# Patient Record
Sex: FEMALE | Race: OTHER | NOT HISPANIC OR LATINO | URBAN - METROPOLITAN AREA
[De-identification: names, ages, dates, MRNs, and addresses within clinical notes are randomized per-mention and may not be internally consistent; named-entity substitution may affect disease eponyms.]

---

## 2017-12-13 ENCOUNTER — APPOINTMENT (RX ONLY)
Dept: URBAN - METROPOLITAN AREA CLINIC 4 | Facility: CLINIC | Age: 37
Setting detail: DERMATOLOGY
End: 2017-12-13

## 2017-12-13 DIAGNOSIS — Z71.89 OTHER SPECIFIED COUNSELING: ICD-10-CM

## 2017-12-13 DIAGNOSIS — L70.0 ACNE VULGARIS: ICD-10-CM

## 2017-12-13 DIAGNOSIS — L81.4 OTHER MELANIN HYPERPIGMENTATION: ICD-10-CM

## 2017-12-13 PROCEDURE — 99213 OFFICE O/P EST LOW 20 MIN: CPT

## 2017-12-13 PROCEDURE — ? COUNSELING

## 2017-12-13 PROCEDURE — ? PRESCRIPTION

## 2017-12-13 ASSESSMENT — LOCATION DETAILED DESCRIPTION DERM
LOCATION DETAILED: LEFT PROXIMAL POSTERIOR UPPER ARM
LOCATION DETAILED: INFERIOR MID FOREHEAD
LOCATION DETAILED: RIGHT INFERIOR MEDIAL MALAR CHEEK
LOCATION DETAILED: RIGHT PROXIMAL POSTERIOR UPPER ARM
LOCATION DETAILED: LEFT INFERIOR CENTRAL MALAR CHEEK
LOCATION DETAILED: RIGHT SUPERIOR MEDIAL UPPER BACK

## 2017-12-13 ASSESSMENT — LOCATION ZONE DERM
LOCATION ZONE: ARM
LOCATION ZONE: TRUNK
LOCATION ZONE: FACE

## 2017-12-13 ASSESSMENT — LOCATION SIMPLE DESCRIPTION DERM
LOCATION SIMPLE: LEFT POSTERIOR UPPER ARM
LOCATION SIMPLE: RIGHT CHEEK
LOCATION SIMPLE: RIGHT UPPER BACK
LOCATION SIMPLE: RIGHT POSTERIOR UPPER ARM
LOCATION SIMPLE: LEFT CHEEK
LOCATION SIMPLE: INFERIOR FOREHEAD

## 2017-12-14 RX ORDER — DOXYCYCLINE HYCLATE 100 MG/1
CAPSULE, GELATIN COATED ORAL BID
Qty: 60 | Refills: 4 | Status: ERX | COMMUNITY
Start: 2017-12-14

## 2017-12-14 RX ADMIN — DOXYCYCLINE HYCLATE Q: 100 CAPSULE, GELATIN COATED ORAL at 00:00

## 2018-06-12 RX ORDER — DOXYCYCLINE HYCLATE 100 MG/1
CAPSULE, GELATIN COATED ORAL BID
Qty: 60 | Refills: 4 | Status: ERX

## 2018-06-13 RX ORDER — DOXYCYCLINE HYCLATE 100 MG/1
CAPSULE, GELATIN COATED ORAL BID
Qty: 60 | Refills: 0 | Status: ERX

## 2018-12-17 RX ORDER — DOXYCYCLINE HYCLATE 100 MG/1
100 MG CAPSULE, GELATIN COATED ORAL BID
Qty: 60 | Refills: 0 | Status: ERX

## 2019-02-26 RX ORDER — DOXYCYCLINE HYCLATE 100 MG/1
CAPSULE, GELATIN COATED ORAL BID
Qty: 60 | Refills: 4 | Status: ERX

## 2019-03-12 ENCOUNTER — APPOINTMENT (RX ONLY)
Dept: URBAN - METROPOLITAN AREA CLINIC 4 | Facility: CLINIC | Age: 39
Setting detail: DERMATOLOGY
End: 2019-03-12

## 2019-03-12 DIAGNOSIS — L70.0 ACNE VULGARIS: ICD-10-CM

## 2019-03-12 DIAGNOSIS — L91.8 OTHER HYPERTROPHIC DISORDERS OF THE SKIN: ICD-10-CM

## 2019-03-12 DIAGNOSIS — L70.8 OTHER ACNE: ICD-10-CM

## 2019-03-12 PROCEDURE — ? COUNSELING

## 2019-03-12 PROCEDURE — ? TREATMENT REGIMEN

## 2019-03-12 PROCEDURE — 99213 OFFICE O/P EST LOW 20 MIN: CPT

## 2019-03-12 PROCEDURE — ? BENIGN DESTRUCTION COSMETIC

## 2019-03-12 PROCEDURE — ? PRESCRIPTION

## 2019-03-12 RX ORDER — DOXYCYCLINE HYCLATE 100 MG/1
CAPSULE, GELATIN COATED ORAL QD
Qty: 60 | Refills: 11 | Status: ERX

## 2019-03-12 ASSESSMENT — LOCATION SIMPLE DESCRIPTION DERM
LOCATION SIMPLE: LEFT CHEEK
LOCATION SIMPLE: LEFT BREAST
LOCATION SIMPLE: ABDOMEN

## 2019-03-12 ASSESSMENT — LOCATION DETAILED DESCRIPTION DERM
LOCATION DETAILED: LEFT MEDIAL BREAST 11-12:00 REGION
LOCATION DETAILED: LEFT INFERIOR CENTRAL MALAR CHEEK
LOCATION DETAILED: RIGHT RIB CAGE

## 2019-03-12 ASSESSMENT — LOCATION ZONE DERM
LOCATION ZONE: TRUNK
LOCATION ZONE: FACE

## 2019-03-12 NOTE — PROCEDURE: COUNSELING
Topical Clindamycin Counseling: Patient counseled that this medication may cause skin irritation or allergic reactions.  In the event of skin irritation, the patient was advised to reduce the amount of the drug applied or use it less frequently.   The patient verbalized understanding of the proper use and possible adverse effects of clindamycin.  All of the patient's questions and concerns were addressed.
Use Enhanced Medication Counseling?: No
Birth Control Pills Counseling: Birth Control Pill Counseling: I discussed with the patient the potential side effects of OCPs including but not limited to increased risk of stroke, heart attack, thrombophlebitis, deep venous thrombosis, hepatic adenomas, breast changes, GI upset, headaches, and depression.  The patient verbalized understanding of the proper use and possible adverse effects of OCPs. All of the patient's questions and concerns were addressed.
Doxycycline Pregnancy And Lactation Text: This medication is Pregnancy Category D and not consider safe during pregnancy. It is also excreted in breast milk but is considered safe for shorter treatment courses.
Azithromycin Counseling:  I discussed with the patient the risks of azithromycin including but not limited to GI upset, allergic reaction, drug rash, diarrhea, and yeast infections.
High Dose Vitamin A Counseling: Side effects reviewed, pt to contact office should one occur.
Isotretinoin Pregnancy And Lactation Text: This medication is Pregnancy Category X and is considered extremely dangerous during pregnancy. It is unknown if it is excreted in breast milk.
Spironolactone Counseling: Patient advised regarding risks of diarrhea, abdominal pain, hyperkalemia, birth defects (for female patients), liver toxicity and renal toxicity. The patient may need blood work to monitor liver and kidney function and potassium levels while on therapy. The patient verbalized understanding of the proper use and possible adverse effects of spironolactone.  All of the patient's questions and concerns were addressed.
Benzoyl Peroxide Pregnancy And Lactation Text: This medication is Pregnancy Category C. It is unknown if benzoyl peroxide is excreted in breast milk.
Erythromycin Counseling:  I discussed with the patient the risks of erythromycin including but not limited to GI upset, allergic reaction, drug rash, diarrhea, increase in liver enzymes, and yeast infections.
High Dose Vitamin A Pregnancy And Lactation Text: High dose vitamin A therapy is contraindicated during pregnancy and breast feeding.
Azithromycin Pregnancy And Lactation Text: This medication is considered safe during pregnancy and is also secreted in breast milk.
Tetracycline Counseling: Patient counseled regarding possible photosensitivity and increased risk for sunburn.  Patient instructed to avoid sunlight, if possible.  When exposed to sunlight, patients should wear protective clothing, sunglasses, and sunscreen.  The patient was instructed to call the office immediately if the following severe adverse effects occur:  hearing changes, easy bruising/bleeding, severe headache, or vision changes.  The patient verbalized understanding of the proper use and possible adverse effects of tetracycline.  All of the patient's questions and concerns were addressed. Patient understands to avoid pregnancy while on therapy due to potential birth defects.
Spironolactone Pregnancy And Lactation Text: This medication can cause feminization of the male fetus and should be avoided during pregnancy. The active metabolite is also found in breast milk.
Topical Retinoid counseling:  Patient advised to apply a pea-sized amount only at bedtime and wait 30 minutes after washing their face before applying.  If too drying, patient may add a non-comedogenic moisturizer. The patient verbalized understanding of the proper use and possible adverse effects of retinoids.  All of the patient's questions and concerns were addressed.
Topical Clindamycin Pregnancy And Lactation Text: This medication is Pregnancy Category B and is considered safe during pregnancy. It is unknown if it is excreted in breast milk.
Detail Level: Zone
Birth Control Pills Pregnancy And Lactation Text: This medication should be avoided if pregnant and for the first 30 days post-partum.
Minocycline Counseling: Patient advised regarding possible photosensitivity and discoloration of the teeth, skin, lips, tongue and gums.  Patient instructed to avoid sunlight, if possible.  When exposed to sunlight, patients should wear protective clothing, sunglasses, and sunscreen.  The patient was instructed to call the office immediately if the following severe adverse effects occur:  hearing changes, easy bruising/bleeding, severe headache, or vision changes.  The patient verbalized understanding of the proper use and possible adverse effects of minocycline.  All of the patient's questions and concerns were addressed.
Tetracycline Pregnancy And Lactation Text: This medication is Pregnancy Category D and not consider safe during pregnancy. It is also excreted in breast milk.
Tazorac Counseling:  Patient advised that medication is irritating and drying.  Patient may need to apply sparingly and wash off after an hour before eventually leaving it on overnight.  The patient verbalized understanding of the proper use and possible adverse effects of tazorac.  All of the patient's questions and concerns were addressed.
Topical Retinoid Pregnancy And Lactation Text: This medication is Pregnancy Category C. It is unknown if this medication is excreted in breast milk.
Topical Sulfur Applications Counseling: Topical Sulfur Counseling: Patient counseled that this medication may cause skin irritation or allergic reactions.  In the event of skin irritation, the patient was advised to reduce the amount of the drug applied or use it less frequently.   The patient verbalized understanding of the proper use and possible adverse effects of topical sulfur application.  All of the patient's questions and concerns were addressed.
Dapsone Counseling: I discussed with the patient the risks of dapsone including but not limited to hemolytic anemia, agranulocytosis, rashes, methemoglobinemia, kidney failure, peripheral neuropathy, headaches, GI upset, and liver toxicity.  Patients who start dapsone require monitoring including baseline LFTs and weekly CBCs for the first month, then every month thereafter.  The patient verbalized understanding of the proper use and possible adverse effects of dapsone.  All of the patient's questions and concerns were addressed.
Erythromycin Pregnancy And Lactation Text: This medication is Pregnancy Category B and is considered safe during pregnancy. It is also excreted in breast milk.
Benzoyl Peroxide Counseling: Patient counseled that medicine may cause skin irritation and bleach clothing.  In the event of skin irritation, the patient was advised to reduce the amount of the drug applied or use it less frequently.   The patient verbalized understanding of the proper use and possible adverse effects of benzoyl peroxide.  All of the patient's questions and concerns were addressed.
Tazorac Pregnancy And Lactation Text: This medication is not safe during pregnancy. It is unknown if this medication is excreted in breast milk.
Bactrim Pregnancy And Lactation Text: This medication is Pregnancy Category D and is known to cause fetal risk.  It is also excreted in breast milk.
Doxycycline Counseling:  Patient counseled regarding possible photosensitivity and increased risk for sunburn.  Patient instructed to avoid sunlight, if possible.  When exposed to sunlight, patients should wear protective clothing, sunglasses, and sunscreen.  The patient was instructed to call the office immediately if the following severe adverse effects occur:  hearing changes, easy bruising/bleeding, severe headache, or vision changes.  The patient verbalized understanding of the proper use and possible adverse effects of doxycycline.  All of the patient's questions and concerns were addressed.
Bactrim Counseling:  I discussed with the patient the risks of sulfa antibiotics including but not limited to GI upset, allergic reaction, drug rash, diarrhea, dizziness, photosensitivity, and yeast infections.  Rarely, more serious reactions can occur including but not limited to aplastic anemia, agranulocytosis, methemoglobinemia, blood dyscrasias, liver or kidney failure, lung infiltrates or desquamative/blistering drug rashes.
Topical Sulfur Applications Pregnancy And Lactation Text: This medication is Pregnancy Category C and has an unknown safety profile during pregnancy. It is unknown if this topical medication is excreted in breast milk.
Dapsone Pregnancy And Lactation Text: This medication is Pregnancy Category C and is not considered safe during pregnancy or breast feeding.
Isotretinoin Counseling: Patient should get monthly blood tests, not donate blood, not drive at night if vision affected, not share medication, and not undergo elective surgery for 6 months after tx completed. Side effects reviewed, pt to contact office should one occur.

## 2019-03-12 NOTE — PROCEDURE: BENIGN DESTRUCTION COSMETIC
Post-Care Instructions: I reviewed with the patient in detail post-care instructions. Patient is to wear sunprotection, and avoid picking at any of the treated lesions. Pt may apply Vaseline to crusted or scabbing areas.
Consent: The patient's consent was obtained including but not limited to risks of crusting, scabbing, blistering, scarring, darker or lighter pigmentary change, recurrence, incomplete removal and infection.
Anesthesia Volume In Cc: 0.5
Detail Level: Detailed
Anesthesia Type: 1% lidocaine with 1:100,000 epinephrine and a 1:10 solution of 8.4% sodium bicarbonate

## 2019-06-20 ENCOUNTER — HOSPITAL ENCOUNTER (OUTPATIENT)
Dept: RADIOLOGY | Facility: MEDICAL CENTER | Age: 39
End: 2019-06-20
Attending: PHYSICIAN ASSISTANT
Payer: COMMERCIAL

## 2019-06-20 DIAGNOSIS — R51.9 NONINTRACTABLE HEADACHE, UNSPECIFIED CHRONICITY PATTERN, UNSPECIFIED HEADACHE TYPE: ICD-10-CM

## 2019-06-20 PROCEDURE — 70450 CT HEAD/BRAIN W/O DYE: CPT

## 2020-06-03 ENCOUNTER — APPOINTMENT (RX ONLY)
Dept: URBAN - METROPOLITAN AREA CLINIC 4 | Facility: CLINIC | Age: 40
Setting detail: DERMATOLOGY
End: 2020-06-03

## 2020-06-03 DIAGNOSIS — D22 MELANOCYTIC NEVI: ICD-10-CM

## 2020-06-03 DIAGNOSIS — D18.0 HEMANGIOMA: ICD-10-CM

## 2020-06-03 DIAGNOSIS — L91.8 OTHER HYPERTROPHIC DISORDERS OF THE SKIN: ICD-10-CM

## 2020-06-03 DIAGNOSIS — L81.4 OTHER MELANIN HYPERPIGMENTATION: ICD-10-CM

## 2020-06-03 DIAGNOSIS — L70.0 ACNE VULGARIS: ICD-10-CM | Status: WELL CONTROLLED

## 2020-06-03 PROBLEM — D22.5 MELANOCYTIC NEVI OF TRUNK: Status: ACTIVE | Noted: 2020-06-03

## 2020-06-03 PROBLEM — D22.61 MELANOCYTIC NEVI OF RIGHT UPPER LIMB, INCLUDING SHOULDER: Status: ACTIVE | Noted: 2020-06-03

## 2020-06-03 PROBLEM — D18.01 HEMANGIOMA OF SKIN AND SUBCUTANEOUS TISSUE: Status: ACTIVE | Noted: 2020-06-03

## 2020-06-03 PROBLEM — D23.39 OTHER BENIGN NEOPLASM OF SKIN OF OTHER PARTS OF FACE: Status: ACTIVE | Noted: 2020-06-03

## 2020-06-03 PROBLEM — D22.62 MELANOCYTIC NEVI OF LEFT UPPER LIMB, INCLUDING SHOULDER: Status: ACTIVE | Noted: 2020-06-03

## 2020-06-03 PROCEDURE — ? TREATMENT REGIMEN

## 2020-06-03 PROCEDURE — 99213 OFFICE O/P EST LOW 20 MIN: CPT

## 2020-06-03 PROCEDURE — ? PRESCRIPTION

## 2020-06-03 PROCEDURE — ? COUNSELING

## 2020-06-03 PROCEDURE — ? BENIGN DESTRUCTION COSMETIC

## 2020-06-03 RX ORDER — DOXYCYCLINE HYCLATE 100 MG/1
CAPSULE, GELATIN COATED ORAL QD
Qty: 60 | Refills: 11 | Status: ERX

## 2020-06-03 ASSESSMENT — LOCATION DETAILED DESCRIPTION DERM
LOCATION DETAILED: RIGHT DISTAL POSTERIOR THIGH
LOCATION DETAILED: LEFT PROXIMAL DORSAL FOREARM
LOCATION DETAILED: RIGHT MEDIAL UPPER BACK
LOCATION DETAILED: RIGHT DISTAL RADIAL DORSAL FOREARM
LOCATION DETAILED: LEFT ELBOW
LOCATION DETAILED: INFERIOR THORACIC SPINE
LOCATION DETAILED: RIGHT PROXIMAL RADIAL DORSAL FOREARM
LOCATION DETAILED: LEFT DISTAL POSTERIOR THIGH
LOCATION DETAILED: LEFT INFERIOR CENTRAL MALAR CHEEK
LOCATION DETAILED: INFERIOR LUMBAR SPINE
LOCATION DETAILED: SUBXIPHOID

## 2020-06-03 ASSESSMENT — LOCATION SIMPLE DESCRIPTION DERM
LOCATION SIMPLE: LEFT POSTERIOR THIGH
LOCATION SIMPLE: RIGHT FOREARM
LOCATION SIMPLE: LEFT CHEEK
LOCATION SIMPLE: LEFT FOREARM
LOCATION SIMPLE: UPPER BACK
LOCATION SIMPLE: ABDOMEN
LOCATION SIMPLE: RIGHT UPPER BACK
LOCATION SIMPLE: LEFT ELBOW
LOCATION SIMPLE: RIGHT POSTERIOR THIGH
LOCATION SIMPLE: LOWER BACK

## 2020-06-03 ASSESSMENT — LOCATION ZONE DERM
LOCATION ZONE: TRUNK
LOCATION ZONE: ARM
LOCATION ZONE: FACE
LOCATION ZONE: LEG

## 2020-06-03 NOTE — PROCEDURE: BENIGN DESTRUCTION COSMETIC
Anesthesia Type: 1% lidocaine with 1:100,000 epinephrine and a 1:10 solution of 8.4% sodium bicarbonate
Consent: The patient's consent was obtained including but not limited to risks of crusting, scabbing, blistering, scarring, darker or lighter pigmentary change, recurrence, incomplete removal and infection.
Anesthesia Volume In Cc: 0
Detail Level: Detailed
Post-Care Instructions: I reviewed with the patient in detail post-care instructions. Patient is to wear sunprotection, and avoid picking at any of the treated lesions. Pt may apply Vaseline to crusted or scabbing areas.

## 2020-06-03 NOTE — PROCEDURE: COUNSELING
Erythromycin Pregnancy And Lactation Text: This medication is Pregnancy Category B and is considered safe during pregnancy. It is also excreted in breast milk.
Benzoyl Peroxide Pregnancy And Lactation Text: This medication is Pregnancy Category C. It is unknown if benzoyl peroxide is excreted in breast milk.
Doxycycline Pregnancy And Lactation Text: This medication is Pregnancy Category D and not consider safe during pregnancy. It is also excreted in breast milk but is considered safe for shorter treatment courses.
Minocycline Counseling: Patient advised regarding possible photosensitivity and discoloration of the teeth, skin, lips, tongue and gums.  Patient instructed to avoid sunlight, if possible.  When exposed to sunlight, patients should wear protective clothing, sunglasses, and sunscreen.  The patient was instructed to call the office immediately if the following severe adverse effects occur:  hearing changes, easy bruising/bleeding, severe headache, or vision changes.  The patient verbalized understanding of the proper use and possible adverse effects of minocycline.  All of the patient's questions and concerns were addressed.
Minocycline Pregnancy And Lactation Text: This medication is Pregnancy Category D and not consider safe during pregnancy. It is also excreted in breast milk.
High Dose Vitamin A Counseling: Side effects reviewed, pt to contact office should one occur.
Topical Clindamycin Pregnancy And Lactation Text: This medication is Pregnancy Category B and is considered safe during pregnancy. It is unknown if it is excreted in breast milk.
Isotretinoin Pregnancy And Lactation Text: This medication is Pregnancy Category X and is considered extremely dangerous during pregnancy. It is unknown if it is excreted in breast milk.
Topical Retinoid Pregnancy And Lactation Text: This medication is Pregnancy Category C. It is unknown if this medication is excreted in breast milk.
Azithromycin Counseling:  I discussed with the patient the risks of azithromycin including but not limited to GI upset, allergic reaction, drug rash, diarrhea, and yeast infections.
Detail Level: Zone
Doxycycline Counseling:  Patient counseled regarding possible photosensitivity and increased risk for sunburn.  Patient instructed to avoid sunlight, if possible.  When exposed to sunlight, patients should wear protective clothing, sunglasses, and sunscreen.  The patient was instructed to call the office immediately if the following severe adverse effects occur:  hearing changes, easy bruising/bleeding, severe headache, or vision changes.  The patient verbalized understanding of the proper use and possible adverse effects of doxycycline.  All of the patient's questions and concerns were addressed.
Bactrim Counseling:  I discussed with the patient the risks of sulfa antibiotics including but not limited to GI upset, allergic reaction, drug rash, diarrhea, dizziness, photosensitivity, and yeast infections.  Rarely, more serious reactions can occur including but not limited to aplastic anemia, agranulocytosis, methemoglobinemia, blood dyscrasias, liver or kidney failure, lung infiltrates or desquamative/blistering drug rashes.
Topical Clindamycin Counseling: Patient counseled that this medication may cause skin irritation or allergic reactions.  In the event of skin irritation, the patient was advised to reduce the amount of the drug applied or use it less frequently.   The patient verbalized understanding of the proper use and possible adverse effects of clindamycin.  All of the patient's questions and concerns were addressed.
Dapsone Pregnancy And Lactation Text: This medication is Pregnancy Category C and is not considered safe during pregnancy or breast feeding.
Spironolactone Pregnancy And Lactation Text: This medication can cause feminization of the male fetus and should be avoided during pregnancy. The active metabolite is also found in breast milk.
Azithromycin Pregnancy And Lactation Text: This medication is considered safe during pregnancy and is also secreted in breast milk.
Birth Control Pills Pregnancy And Lactation Text: This medication should be avoided if pregnant and for the first 30 days post-partum.
Use Enhanced Medication Counseling?: No
Birth Control Pills Counseling: Birth Control Pill Counseling: I discussed with the patient the potential side effects of OCPs including but not limited to increased risk of stroke, heart attack, thrombophlebitis, deep venous thrombosis, hepatic adenomas, breast changes, GI upset, headaches, and depression.  The patient verbalized understanding of the proper use and possible adverse effects of OCPs. All of the patient's questions and concerns were addressed.
Topical Retinoid counseling:  Patient advised to apply a pea-sized amount only at bedtime and wait 30 minutes after washing their face before applying.  If too drying, patient may add a non-comedogenic moisturizer. The patient verbalized understanding of the proper use and possible adverse effects of retinoids.  All of the patient's questions and concerns were addressed.
Erythromycin Counseling:  I discussed with the patient the risks of erythromycin including but not limited to GI upset, allergic reaction, drug rash, diarrhea, increase in liver enzymes, and yeast infections.
Tazorac Pregnancy And Lactation Text: This medication is not safe during pregnancy. It is unknown if this medication is excreted in breast milk.
Dapsone Counseling: I discussed with the patient the risks of dapsone including but not limited to hemolytic anemia, agranulocytosis, rashes, methemoglobinemia, kidney failure, peripheral neuropathy, headaches, GI upset, and liver toxicity.  Patients who start dapsone require monitoring including baseline LFTs and weekly CBCs for the first month, then every month thereafter.  The patient verbalized understanding of the proper use and possible adverse effects of dapsone.  All of the patient's questions and concerns were addressed.
Isotretinoin Counseling: Patient should get monthly blood tests, not donate blood, not drive at night if vision affected, not share medication, and not undergo elective surgery for 6 months after tx completed. Side effects reviewed, pt to contact office should one occur.
Tetracycline Counseling: Patient counseled regarding possible photosensitivity and increased risk for sunburn.  Patient instructed to avoid sunlight, if possible.  When exposed to sunlight, patients should wear protective clothing, sunglasses, and sunscreen.  The patient was instructed to call the office immediately if the following severe adverse effects occur:  hearing changes, easy bruising/bleeding, severe headache, or vision changes.  The patient verbalized understanding of the proper use and possible adverse effects of tetracycline.  All of the patient's questions and concerns were addressed. Patient understands to avoid pregnancy while on therapy due to potential birth defects.
Bactrim Pregnancy And Lactation Text: This medication is Pregnancy Category D and is known to cause fetal risk.  It is also excreted in breast milk.
Benzoyl Peroxide Counseling: Patient counseled that medicine may cause skin irritation and bleach clothing.  In the event of skin irritation, the patient was advised to reduce the amount of the drug applied or use it less frequently.   The patient verbalized understanding of the proper use and possible adverse effects of benzoyl peroxide.  All of the patient's questions and concerns were addressed.
High Dose Vitamin A Pregnancy And Lactation Text: High dose vitamin A therapy is contraindicated during pregnancy and breast feeding.
Spironolactone Counseling: Patient advised regarding risks of diarrhea, abdominal pain, hyperkalemia, birth defects (for female patients), liver toxicity and renal toxicity. The patient may need blood work to monitor liver and kidney function and potassium levels while on therapy. The patient verbalized understanding of the proper use and possible adverse effects of spironolactone.  All of the patient's questions and concerns were addressed.
Topical Sulfur Applications Counseling: Topical Sulfur Counseling: Patient counseled that this medication may cause skin irritation or allergic reactions.  In the event of skin irritation, the patient was advised to reduce the amount of the drug applied or use it less frequently.   The patient verbalized understanding of the proper use and possible adverse effects of topical sulfur application.  All of the patient's questions and concerns were addressed.
Topical Sulfur Applications Pregnancy And Lactation Text: This medication is Pregnancy Category C and has an unknown safety profile during pregnancy. It is unknown if this topical medication is excreted in breast milk.
Tazorac Counseling:  Patient advised that medication is irritating and drying.  Patient may need to apply sparingly and wash off after an hour before eventually leaving it on overnight.  The patient verbalized understanding of the proper use and possible adverse effects of tazorac.  All of the patient's questions and concerns were addressed.
Detail Level: Detailed

## 2021-08-04 ENCOUNTER — APPOINTMENT (RX ONLY)
Dept: URBAN - METROPOLITAN AREA CLINIC 4 | Facility: CLINIC | Age: 41
Setting detail: DERMATOLOGY
End: 2021-08-04

## 2021-08-04 DIAGNOSIS — L70.0 ACNE VULGARIS: ICD-10-CM

## 2021-08-04 DIAGNOSIS — D18.0 HEMANGIOMA: ICD-10-CM

## 2021-08-04 DIAGNOSIS — L91.8 OTHER HYPERTROPHIC DISORDERS OF THE SKIN: ICD-10-CM

## 2021-08-04 DIAGNOSIS — L81.4 OTHER MELANIN HYPERPIGMENTATION: ICD-10-CM

## 2021-08-04 DIAGNOSIS — D22 MELANOCYTIC NEVI: ICD-10-CM

## 2021-08-04 PROBLEM — D22.62 MELANOCYTIC NEVI OF LEFT UPPER LIMB, INCLUDING SHOULDER: Status: ACTIVE | Noted: 2021-08-04

## 2021-08-04 PROBLEM — D22.5 MELANOCYTIC NEVI OF TRUNK: Status: ACTIVE | Noted: 2021-08-04

## 2021-08-04 PROBLEM — D18.01 HEMANGIOMA OF SKIN AND SUBCUTANEOUS TISSUE: Status: ACTIVE | Noted: 2021-08-04

## 2021-08-04 PROBLEM — D22.61 MELANOCYTIC NEVI OF RIGHT UPPER LIMB, INCLUDING SHOULDER: Status: ACTIVE | Noted: 2021-08-04

## 2021-08-04 PROCEDURE — ? BENIGN DESTRUCTION COSMETIC

## 2021-08-04 PROCEDURE — ? COUNSELING

## 2021-08-04 PROCEDURE — 99213 OFFICE O/P EST LOW 20 MIN: CPT

## 2021-08-04 PROCEDURE — ? TREATMENT REGIMEN

## 2021-08-04 PROCEDURE — ? PRESCRIPTION

## 2021-08-04 RX ORDER — DOXYCYCLINE HYCLATE 20 MG/1
TABLET, FILM COATED ORAL
Qty: 60 | Refills: 11 | Status: ERX | COMMUNITY
Start: 2021-08-04

## 2021-08-04 RX ADMIN — DOXYCYCLINE HYCLATE: 20 TABLET, FILM COATED ORAL at 00:00

## 2021-08-04 ASSESSMENT — LOCATION DETAILED DESCRIPTION DERM
LOCATION DETAILED: LEFT DISTAL POSTERIOR THIGH
LOCATION DETAILED: RIGHT MEDIAL UPPER BACK
LOCATION DETAILED: INFERIOR LUMBAR SPINE
LOCATION DETAILED: LEFT ELBOW
LOCATION DETAILED: LEFT PROXIMAL DORSAL FOREARM
LOCATION DETAILED: LEFT INFERIOR CENTRAL MALAR CHEEK
LOCATION DETAILED: INFERIOR THORACIC SPINE
LOCATION DETAILED: SUBXIPHOID
LOCATION DETAILED: RIGHT PROXIMAL RADIAL DORSAL FOREARM
LOCATION DETAILED: RIGHT LATERAL ABDOMEN
LOCATION DETAILED: RIGHT DISTAL RADIAL DORSAL FOREARM
LOCATION DETAILED: RIGHT RIB CAGE
LOCATION DETAILED: RIGHT DISTAL POSTERIOR THIGH

## 2021-08-04 ASSESSMENT — LOCATION SIMPLE DESCRIPTION DERM
LOCATION SIMPLE: LEFT POSTERIOR THIGH
LOCATION SIMPLE: LEFT ELBOW
LOCATION SIMPLE: LEFT FOREARM
LOCATION SIMPLE: LEFT CHEEK
LOCATION SIMPLE: RIGHT POSTERIOR THIGH
LOCATION SIMPLE: LOWER BACK
LOCATION SIMPLE: ABDOMEN
LOCATION SIMPLE: UPPER BACK
LOCATION SIMPLE: RIGHT UPPER BACK
LOCATION SIMPLE: RIGHT FOREARM

## 2021-08-04 ASSESSMENT — LOCATION ZONE DERM
LOCATION ZONE: FACE
LOCATION ZONE: TRUNK
LOCATION ZONE: LEG
LOCATION ZONE: ARM

## 2021-08-04 NOTE — PROCEDURE: MIPS QUALITY
Quality 130: Documentation Of Current Medications In The Medical Record: Current Medications Documented
Quality 431: Preventive Care And Screening: Unhealthy Alcohol Use - Screening: Patient screened for unhealthy alcohol use using a single question and scores less than 2 times per year
Detail Level: Detailed
Quality 226: Preventive Care And Screening: Tobacco Use: Screening And Cessation Intervention: Patient screened for tobacco use and is an ex/non-smoker
Performed
Resulted

## 2021-08-04 NOTE — PROCEDURE: COUNSELING
Erythromycin Pregnancy And Lactation Text: This medication is Pregnancy Category B and is considered safe during pregnancy. It is also excreted in breast milk.
Benzoyl Peroxide Pregnancy And Lactation Text: This medication is Pregnancy Category C. It is unknown if benzoyl peroxide is excreted in breast milk.
Doxycycline Pregnancy And Lactation Text: This medication is Pregnancy Category D and not consider safe during pregnancy. It is also excreted in breast milk but is considered safe for shorter treatment courses.
Minocycline Counseling: Patient advised regarding possible photosensitivity and discoloration of the teeth, skin, lips, tongue and gums.  Patient instructed to avoid sunlight, if possible.  When exposed to sunlight, patients should wear protective clothing, sunglasses, and sunscreen.  The patient was instructed to call the office immediately if the following severe adverse effects occur:  hearing changes, easy bruising/bleeding, severe headache, or vision changes.  The patient verbalized understanding of the proper use and possible adverse effects of minocycline.  All of the patient's questions and concerns were addressed.
Minocycline Pregnancy And Lactation Text: This medication is Pregnancy Category D and not consider safe during pregnancy. It is also excreted in breast milk.
High Dose Vitamin A Counseling: Side effects reviewed, pt to contact office should one occur.
Topical Clindamycin Pregnancy And Lactation Text: This medication is Pregnancy Category B and is considered safe during pregnancy. It is unknown if it is excreted in breast milk.
Isotretinoin Pregnancy And Lactation Text: This medication is Pregnancy Category X and is considered extremely dangerous during pregnancy. It is unknown if it is excreted in breast milk.
Topical Retinoid Pregnancy And Lactation Text: This medication is Pregnancy Category C. It is unknown if this medication is excreted in breast milk.
Azithromycin Counseling:  I discussed with the patient the risks of azithromycin including but not limited to GI upset, allergic reaction, drug rash, diarrhea, and yeast infections.
Detail Level: Zone
Doxycycline Counseling:  Patient counseled regarding possible photosensitivity and increased risk for sunburn.  Patient instructed to avoid sunlight, if possible.  When exposed to sunlight, patients should wear protective clothing, sunglasses, and sunscreen.  The patient was instructed to call the office immediately if the following severe adverse effects occur:  hearing changes, easy bruising/bleeding, severe headache, or vision changes.  The patient verbalized understanding of the proper use and possible adverse effects of doxycycline.  All of the patient's questions and concerns were addressed.
Bactrim Counseling:  I discussed with the patient the risks of sulfa antibiotics including but not limited to GI upset, allergic reaction, drug rash, diarrhea, dizziness, photosensitivity, and yeast infections.  Rarely, more serious reactions can occur including but not limited to aplastic anemia, agranulocytosis, methemoglobinemia, blood dyscrasias, liver or kidney failure, lung infiltrates or desquamative/blistering drug rashes.
Topical Clindamycin Counseling: Patient counseled that this medication may cause skin irritation or allergic reactions.  In the event of skin irritation, the patient was advised to reduce the amount of the drug applied or use it less frequently.   The patient verbalized understanding of the proper use and possible adverse effects of clindamycin.  All of the patient's questions and concerns were addressed.
Dapsone Pregnancy And Lactation Text: This medication is Pregnancy Category C and is not considered safe during pregnancy or breast feeding.
Spironolactone Pregnancy And Lactation Text: This medication can cause feminization of the male fetus and should be avoided during pregnancy. The active metabolite is also found in breast milk.
Azithromycin Pregnancy And Lactation Text: This medication is considered safe during pregnancy and is also secreted in breast milk.
Birth Control Pills Pregnancy And Lactation Text: This medication should be avoided if pregnant and for the first 30 days post-partum.
Use Enhanced Medication Counseling?: No
Birth Control Pills Counseling: Birth Control Pill Counseling: I discussed with the patient the potential side effects of OCPs including but not limited to increased risk of stroke, heart attack, thrombophlebitis, deep venous thrombosis, hepatic adenomas, breast changes, GI upset, headaches, and depression.  The patient verbalized understanding of the proper use and possible adverse effects of OCPs. All of the patient's questions and concerns were addressed.
Topical Retinoid counseling:  Patient advised to apply a pea-sized amount only at bedtime and wait 30 minutes after washing their face before applying.  If too drying, patient may add a non-comedogenic moisturizer. The patient verbalized understanding of the proper use and possible adverse effects of retinoids.  All of the patient's questions and concerns were addressed.
Erythromycin Counseling:  I discussed with the patient the risks of erythromycin including but not limited to GI upset, allergic reaction, drug rash, diarrhea, increase in liver enzymes, and yeast infections.
Tazorac Pregnancy And Lactation Text: This medication is not safe during pregnancy. It is unknown if this medication is excreted in breast milk.
Dapsone Counseling: I discussed with the patient the risks of dapsone including but not limited to hemolytic anemia, agranulocytosis, rashes, methemoglobinemia, kidney failure, peripheral neuropathy, headaches, GI upset, and liver toxicity.  Patients who start dapsone require monitoring including baseline LFTs and weekly CBCs for the first month, then every month thereafter.  The patient verbalized understanding of the proper use and possible adverse effects of dapsone.  All of the patient's questions and concerns were addressed.
Isotretinoin Counseling: Patient should get monthly blood tests, not donate blood, not drive at night if vision affected, not share medication, and not undergo elective surgery for 6 months after tx completed. Side effects reviewed, pt to contact office should one occur.
Tetracycline Counseling: Patient counseled regarding possible photosensitivity and increased risk for sunburn.  Patient instructed to avoid sunlight, if possible.  When exposed to sunlight, patients should wear protective clothing, sunglasses, and sunscreen.  The patient was instructed to call the office immediately if the following severe adverse effects occur:  hearing changes, easy bruising/bleeding, severe headache, or vision changes.  The patient verbalized understanding of the proper use and possible adverse effects of tetracycline.  All of the patient's questions and concerns were addressed. Patient understands to avoid pregnancy while on therapy due to potential birth defects.
Bactrim Pregnancy And Lactation Text: This medication is Pregnancy Category D and is known to cause fetal risk.  It is also excreted in breast milk.
Benzoyl Peroxide Counseling: Patient counseled that medicine may cause skin irritation and bleach clothing.  In the event of skin irritation, the patient was advised to reduce the amount of the drug applied or use it less frequently.   The patient verbalized understanding of the proper use and possible adverse effects of benzoyl peroxide.  All of the patient's questions and concerns were addressed.
High Dose Vitamin A Pregnancy And Lactation Text: High dose vitamin A therapy is contraindicated during pregnancy and breast feeding.
Spironolactone Counseling: Patient advised regarding risks of diarrhea, abdominal pain, hyperkalemia, birth defects (for female patients), liver toxicity and renal toxicity. The patient may need blood work to monitor liver and kidney function and potassium levels while on therapy. The patient verbalized understanding of the proper use and possible adverse effects of spironolactone.  All of the patient's questions and concerns were addressed.
Topical Sulfur Applications Counseling: Topical Sulfur Counseling: Patient counseled that this medication may cause skin irritation or allergic reactions.  In the event of skin irritation, the patient was advised to reduce the amount of the drug applied or use it less frequently.   The patient verbalized understanding of the proper use and possible adverse effects of topical sulfur application.  All of the patient's questions and concerns were addressed.
Topical Sulfur Applications Pregnancy And Lactation Text: This medication is Pregnancy Category C and has an unknown safety profile during pregnancy. It is unknown if this topical medication is excreted in breast milk.
Tazorac Counseling:  Patient advised that medication is irritating and drying.  Patient may need to apply sparingly and wash off after an hour before eventually leaving it on overnight.  The patient verbalized understanding of the proper use and possible adverse effects of tazorac.  All of the patient's questions and concerns were addressed.

## 2021-08-04 NOTE — PROCEDURE: BENIGN DESTRUCTION COSMETIC
Post-Care Instructions: I reviewed with the patient in detail post-care instructions. Patient is to wear sunprotection, and avoid picking at any of the treated lesions. Pt may apply Vaseline to crusted or scabbing areas.
Anesthesia Type: 1% lidocaine with 1:100,000 epinephrine and a 1:10 solution of 8.4% sodium bicarbonate
Consent: The patient's consent was obtained including but not limited to risks of crusting, scabbing, blistering, scarring, darker or lighter pigmentary change, recurrence, incomplete removal and infection.
Detail Level: Detailed
Anesthesia Volume In Cc: 0.5

## 2021-12-21 ENCOUNTER — APPOINTMENT (OUTPATIENT)
Dept: NEUROLOGY | Facility: MEDICAL CENTER | Age: 41
End: 2021-12-21
Payer: COMMERCIAL

## 2022-02-14 ENCOUNTER — OFFICE VISIT (OUTPATIENT)
Dept: NEUROLOGY | Facility: MEDICAL CENTER | Age: 42
End: 2022-02-14
Attending: PSYCHIATRY & NEUROLOGY
Payer: COMMERCIAL

## 2022-02-14 VITALS
RESPIRATION RATE: 15 BRPM | TEMPERATURE: 98.4 F | BODY MASS INDEX: 26.74 KG/M2 | HEIGHT: 65 IN | WEIGHT: 160.5 LBS | SYSTOLIC BLOOD PRESSURE: 138 MMHG | DIASTOLIC BLOOD PRESSURE: 82 MMHG | OXYGEN SATURATION: 97 % | HEART RATE: 76 BPM

## 2022-02-14 DIAGNOSIS — G43.719 INTRACTABLE CHRONIC MIGRAINE WITHOUT AURA AND WITHOUT STATUS MIGRAINOSUS: ICD-10-CM

## 2022-02-14 PROBLEM — G43.909 HEADACHE, MIGRAINE: Status: ACTIVE | Noted: 2021-10-20

## 2022-02-14 PROCEDURE — 99204 OFFICE O/P NEW MOD 45 MIN: CPT | Performed by: PSYCHIATRY & NEUROLOGY

## 2022-02-14 PROCEDURE — 99211 OFF/OP EST MAY X REQ PHY/QHP: CPT | Performed by: PSYCHIATRY & NEUROLOGY

## 2022-02-14 RX ORDER — FLUTICASONE FUROATE 100 UG/1
POWDER RESPIRATORY (INHALATION)
COMMUNITY
Start: 2022-01-16 | End: 2022-06-08

## 2022-02-14 RX ORDER — ATOGEPANT 60 MG/1
60 TABLET ORAL DAILY
COMMUNITY
Start: 2021-12-22 | End: 2022-02-17

## 2022-02-14 RX ORDER — RIMEGEPANT SULFATE 75 MG/75MG
TABLET, ORALLY DISINTEGRATING ORAL
COMMUNITY
Start: 2022-01-14 | End: 2022-06-29 | Stop reason: SDUPTHER

## 2022-02-14 RX ORDER — KETOROLAC TROMETHAMINE 10 MG/1
TABLET, FILM COATED ORAL
COMMUNITY
Start: 2021-12-22 | End: 2022-06-08

## 2022-02-14 RX ORDER — NORETHINDRONE ACETATE AND ETHINYL ESTRADIOL, ETHINYL ESTRADIOL AND FERROUS FUMARATE 1MG-10(24)
1 KIT ORAL DAILY
COMMUNITY
Start: 2022-01-15 | End: 2024-02-13 | Stop reason: SDUPTHER

## 2022-02-14 RX ORDER — SUCRALFATE 1 G/1
TABLET ORAL
COMMUNITY
Start: 2022-01-30 | End: 2022-02-17

## 2022-02-14 RX ORDER — RIZATRIPTAN BENZOATE 10 MG/1
TABLET, ORALLY DISINTEGRATING ORAL
COMMUNITY
Start: 2021-12-19 | End: 2022-02-17

## 2022-02-14 RX ORDER — KETOROLAC TROMETHAMINE 15.75 MG/1
15.75 SPRAY, METERED NASAL
COMMUNITY
Start: 2021-11-08 | End: 2022-06-08

## 2022-02-14 ASSESSMENT — PATIENT HEALTH QUESTIONNAIRE - PHQ9: CLINICAL INTERPRETATION OF PHQ2 SCORE: 0

## 2022-02-14 NOTE — PROGRESS NOTES
"Reason for Consult:  Migraine Headache(s)    History of present illness:    Estelita Gates 41 y.o.right handed woman who works at [employer] as a P[employer].     She describes getting headache(s) in 2019 which were exercise related during heavy sparing and occurring after her exercise is completely over. The headache(s) tend to occur in both temples and the bridge of the nose and no where else- a \"squeezing,pressure,like a vice sensation\". Infrequently very mild nausea. Some days she will have mild sensitivity.    About 1.5 years ago- she went to [facility] and saw an NP and has gone through several treatments. She started with Ajovy worked and within 1 week it was \"amazing\" as she was having mild frontal headaches and having the same type of headaches as above \"vice type\". She was totally headache free for about 2 months which was \"incredible\" as a few months before starting Ajovy she was having nearly daily headaches for a few months.    Emgality tried mid summer and had tried Elavil (upset stomach and made her tired).   Topamax- 1st preventative tried> upset stomach (she could not tolerate this within even 2 weeks or so).    She had a nerve block ([facility])- bilateral occipital nerve blocks > no benefit.    Tried Botox in early December 2021 and she had a droopy eye lid and eye brow > she ended in the E.R. about 1 week latera> \"chest pain,anxiety\" and she had an EKG and some testing but found nothing. She is hesitant to try Botox again (next dose in March 2022). She described that both eye lids per drooping and this lasted for months.    On Qulipta- 60 mg a tablet (daily)> > taking daily and this seems to be helping. She is out of Qulipta.    Nurtec 75 mg QOD >> took this about 3 weeks.    Brain [id number]- unremarkable in the last 1 year.    She never had a any notable headache(s) in her life but nothing that sounds to me like migraine(s).    No history of syncope, seizure type " events, evolving gait-balance changes in the last 3 to 6 months or so.      Patient Active Problem List    Diagnosis Date Noted   • Health examination of defined subpopulation 05/23/2012       Past medical history:   No past medical history on file.    Past surgical history:   No past surgical history on file.      Social history:   Social History     Socioeconomic History   • Marital status: Unknown     Spouse name: Not on file   • Number of children: Not on file   • Years of education: Not on file   • Highest education level: Not on file   Occupational History   • Not on file   Tobacco Use   • Smoking status: Never Smoker   • Smokeless tobacco: Never Used   Substance and Sexual Activity   • Alcohol use: Yes     Alcohol/week: 1.8 oz     Types: 3 Cans of beer per week   • Drug use: Never   • Sexual activity: Not on file   Other Topics Concern   • Not on file   Social History Narrative   • Not on file     Social Determinants of Health     Financial Resource Strain:    • Difficulty of Paying Living Expenses: Not on file   Food Insecurity:    • Worried About Running Out of Food in the Last Year: Not on file   • Ran Out of Food in the Last Year: Not on file   Transportation Needs:    • Lack of Transportation (Medical): Not on file   • Lack of Transportation (Non-Medical): Not on file   Physical Activity:    • Days of Exercise per Week: Not on file   • Minutes of Exercise per Session: Not on file   Stress:    • Feeling of Stress : Not on file   Social Connections:    • Frequency of Communication with Friends and Family: Not on file   • Frequency of Social Gatherings with Friends and Family: Not on file   • Attends Holiness Services: Not on file   • Active Member of Clubs or Organizations: Not on file   • Attends Club or Organization Meetings: Not on file   • Marital Status: Not on file   Intimate Partner Violence:    • Fear of Current or Ex-Partner: Not on file   • Emotionally Abused: Not on file   • Physically Abused:  "Not on file   • Sexually Abused: Not on file   Housing Stability:    • Unable to Pay for Housing in the Last Year: Not on file   • Number of Places Lived in the Last Year: Not on file   • Unstable Housing in the Last Year: Not on file       Family history:   No family history on file.      Current medications:   Current Outpatient Medications   Medication   • Atogepant (QULIPTA) 60 MG Tab   • ARNUITY ELLIPTA 100 MCG/ACT AEROSOL POWDER, BREATH ACTIVATED   • ketorolac (TORADOL) 10 MG Tab   • Ketorolac Tromethamine 15.75 MG/SPRAY Solution   • LO LOESTRIN FE 1 MG-10 MCG / 10 MCG Tab   • NURTEC 75 MG TABLET DISPERSIBLE   • Cetirizine HCl (ZYRTEC ALLERGY PO)   • doxycycline (VIBRAMYCIN) 100 MG Cap   • albuterol 108 (90 BASE) MCG/ACT Aero Soln inhalation aerosol   • azithromycin (ZITHROMAX) 250 MG Tab     No current facility-administered medications for this visit.       Medication Allergy:  Allergies   Allergen Reactions   • Ceclor [Cefaclor]    • Penicillins            Physical examination:   Vitals:    02/14/22 0915   BP: 138/82   BP Location: Left arm   Patient Position: Sitting   BP Cuff Size: Adult   Pulse: 76   Resp: 15   Temp: 36.9 °C (98.4 °F)   TempSrc: Temporal   SpO2: 97%   Weight: 72.8 kg (160 lb 7.9 oz)   Height: 1.651 m (5' 5\")       Normal Cephalic atraumatic.  Full Range of Movement around the Neck in all directions without restrictions or discrete pain evoked triggers.  No lower extremity edema.      Neurological  Exam:    Mental status: Awake, alert and fully oriented to person, place, time and situation. Normal attention, concentration and fund of knowledge for education level.  Did not appear/act combative,irritable,anxious,paranoid/delusional or aggressive to or with me.  Speech and language: Speech is fluent without errors, clear, intact to repetition and intact to naming.     Follows 3 step motor commands in sequence without significant delay and correctly.    Cranial nerve exam:  II: Pupils are " equally round and reactive to light. Visual fields are intact by confrontation.  III, IV, VI: EOMI, no diplopia, no ptosis.  Pupils 4>3 mm bilaterally.  Acuity 20/20 bilaterally to Snellen Card.  Visual lebron full to confrontation.  V: Sensation to light touch is normal over V1-3 distributions bilaterally.  .  VII: Facial movements are symmetrical. There is no facial droop. .  VIII: Hearing intact to soft speech and finger rub bilaterally  IX: Palate elevates symmetrically, uvula is midline. Dysarthria is not present.  XI: Shoulder shrug are symmetrical and strong.   XII: Tongue protrudes midline.        Motor exam:  Muscle tone is normal in all 4 limbs. and No abnormal movements appreciated.    Muscle strength:    Neck Flexors/Extensors: 5/5       Right  Left  Deltoid   5/5  5/5      Biceps   5/5  5/5  Triceps  5/5  5/5   Wrist extensors 5/5  5/5  Wrist flexors  5/5  5/5     5/5  5/5  Interossei  5/5  5/5  Thenar (APB)  5/5  5/5   Hip flexors  5/5  5/5  Quadriceps  5/5  5/5    Hamstrings  5/5  5/5  Dorsiflexors  5/5  5/5  Plantarflexors  5/5  5/5  Toe extension  5/5  5/5  Toe Flexors                5/5                   5/5      Reflexes:       Right  Left  Biceps   2/4  2/4  Triceps  2/4  2/4  Brachioradialis 2/4  2/4  Knee jerk  2/4  2/4  Ankle jerk  2/4  2/4     Frontal release signs are absent.    bilaterally toes are downgoing to plantar stimulation..    Coordination (finger-to-nose, heel/knee/shin, rapid alternating movements) was normal.     There was no truncal ataxia, no tremors, and no dysmetria.     Station and gait - easily stands up from exam chair without retropulsion,veering,leaning,swaying (to either side).   Arm swing symmetrical.    No rombergism.    NEUROIMAGING:       Impression/Plans/Recommendations:    1. Migraine (chronic)- for 2-3 years.    Clearly had improvement with Ajovy with excellent results for 2 full months.    She has tried 5-6 different preventative for migraine(s).    No  clear triggers (food or environmental).    More recently her migraine events have bene less severe (she had not taken Quilpta for 1 week when she ran out of samples)> her headaches seemed to still see lucina off it.    Headache(s)- 3 days per week frequency since early January 2022.  Headache(s) are 50% better in the recent months.    No change in characteristics of her headache(s) in the recent months.    In the recent months she is not having exercise related headaches (during her cardio) in the recent months.    Plans:    A. Nurtec 75 mg PO QOD for prevention- goals of Rx reviewed with Estelita today and expectations for Rx.    B. Has not tried Aomivig to date.    C. 400 mg of magnesium oxide for prevention strategy.      I have performed  a history and physical exam and a directed /focused  ROS today.    Total time spent today or this patient's care was 28 minutes  and included reviewing diagnostic workup to date and   medications as well as giving advise and suggestions on the present neurological problem and  documenting the clinical information in the EMR.    Follow up by email this point.    Gurvinder Anguiano MD  [facility].   [facility]

## 2022-02-15 ENCOUNTER — DOCUMENTATION (OUTPATIENT)
Dept: NEUROLOGY | Facility: MEDICAL CENTER | Age: 42
End: 2022-02-15

## 2022-02-15 DIAGNOSIS — G43.709 CHRONIC MIGRAINE WITHOUT AURA WITHOUT STATUS MIGRAINOSUS, NOT INTRACTABLE: ICD-10-CM

## 2022-02-17 ENCOUNTER — OFFICE VISIT (OUTPATIENT)
Dept: NEUROLOGY | Facility: MEDICAL CENTER | Age: 42
End: 2022-02-17
Attending: NURSE PRACTITIONER
Payer: COMMERCIAL

## 2022-02-17 VITALS
OXYGEN SATURATION: 99 % | WEIGHT: 162.92 LBS | HEART RATE: 80 BPM | SYSTOLIC BLOOD PRESSURE: 122 MMHG | RESPIRATION RATE: 14 BRPM | DIASTOLIC BLOOD PRESSURE: 80 MMHG | HEIGHT: 65 IN | TEMPERATURE: 97.2 F | BODY MASS INDEX: 27.14 KG/M2

## 2022-02-17 DIAGNOSIS — G43.709 CHRONIC MIGRAINE W/O AURA W/O STATUS MIGRAINOSUS, NOT INTRACTABLE: ICD-10-CM

## 2022-02-17 DIAGNOSIS — G43.709 CHRONIC MIGRAINE WITHOUT AURA WITHOUT STATUS MIGRAINOSUS, NOT INTRACTABLE: ICD-10-CM

## 2022-02-17 PROCEDURE — 99215 OFFICE O/P EST HI 40 MIN: CPT | Performed by: NURSE PRACTITIONER

## 2022-02-17 PROCEDURE — 99212 OFFICE O/P EST SF 10 MIN: CPT | Performed by: NURSE PRACTITIONER

## 2022-02-17 ASSESSMENT — ENCOUNTER SYMPTOMS
SINUS PAIN: 0
SPEECH CHANGE: 0
HEADACHES: 1
DIZZINESS: 0
PHOTOPHOBIA: 0
DEPRESSION: 0
TINGLING: 0
NAUSEA: 1
COUGH: 0
NERVOUS/ANXIOUS: 0
NECK PAIN: 0
FOCAL WEAKNESS: 0
BLURRED VISION: 0
BACK PAIN: 0
FEVER: 0

## 2022-02-17 NOTE — PATIENT INSTRUCTIONS
I recommend the following over the counter supplements every night at bedtime:  Start magnesium oxide 400mg by mouth every night, may take extra dose if needed for headache (over the counter), hold for diarrhea         Start Riboflavin (Vitamin B2) 400mg by mouth every night (over the counter),may turn urine bright yellow         Start COQ 10, take 300mg every night. (over the counter)          Attempt to go to bed and get up at the same time every night           Eat meals on regular basis            Stay hydrated.             Aerobic exercise 30 minutes daily             Avoid aged or smoked foods, avoid processed foods, red wine, aged cheese              Keep headache diary, include foods that you may have eaten.             Avoid overusing over the counter medications:  Do not take more than 500mg acetaminophen (tylenol), more than 4 times weekly, more frequent or larger doses are associated with medication overuse headache.          Migraine Headache  A migraine headache is a very strong throbbing pain on one side or both sides of your head. This type of headache can also cause other symptoms. It can last from 4 hours to 3 days. Talk with your doctor about what things may bring on (trigger) this condition.  What are the causes?  The exact cause of this condition is not known. This condition may be triggered or caused by:  · Drinking alcohol.  · Smoking.  · Taking medicines, such as:  ? Medicine used to treat chest pain (nitroglycerin).  ? Birth control pills.  ? Estrogen.  ? Some blood pressure medicines.  · Eating or drinking certain products.  · Doing physical activity.  Other things that may trigger a migraine headache include:  · Having a menstrual period.  · Pregnancy.  · Hunger.  · Stress.  · Not getting enough sleep or getting too much sleep.  · Weather changes.  · Tiredness (fatigue).  What increases the risk?  · Being 25-55 years old.  · Being female.  · Having a family history of migraine  headaches.  · Being .  · Having depression or anxiety.  · Being very overweight.  What are the signs or symptoms?  · A throbbing pain. This pain may:  ? Happen in any area of the head, such as on one side or both sides.  ? Make it hard to do daily activities.  ? Get worse with physical activity.  ? Get worse around bright lights or loud noises.  · Other symptoms may include:  ? Feeling sick to your stomach (nauseous).  ? Vomiting.  ? Dizziness.  ? Being sensitive to bright lights, loud noises, or smells.  · Before you get a migraine headache, you may get warning signs (an aura). An aura may include:  ? Seeing flashing lights or having blind spots.  ? Seeing bright spots, halos, or zigzag lines.  ? Having tunnel vision or blurred vision.  ? Having numbness or a tingling feeling.  ? Having trouble talking.  ? Having weak muscles.  · Some people have symptoms after a migraine headache (postdromal phase), such as:  ? Tiredness.  ? Trouble thinking (concentrating).  How is this treated?  · Taking medicines that:  ? Relieve pain.  ? Relieve the feeling of being sick to your stomach.  ? Prevent migraine headaches.  · Treatment may also include:  ? Having acupuncture.  ? Avoiding foods that bring on migraine headaches.  ? Learning ways to control your body functions (biofeedback).  ? Therapy to help you know and deal with negative thoughts (cognitive behavioral therapy).  Follow these instructions at home:  Medicines  · Take over-the-counter and prescription medicines only as told by your doctor.  · Ask your doctor if the medicine prescribed to you:  ? Requires you to avoid driving or using heavy machinery.  ? Can cause trouble pooping (constipation). You may need to take these steps to prevent or treat trouble pooping:  § Drink enough fluid to keep your pee (urine) pale yellow.  § Take over-the-counter or prescription medicines.  § Eat foods that are high in fiber. These include beans, whole grains, and fresh  fruits and vegetables.  § Limit foods that are high in fat and sugar. These include fried or sweet foods.  Lifestyle  · Do not drink alcohol.  · Do not use any products that contain nicotine or tobacco, such as cigarettes, e-cigarettes, and chewing tobacco. If you need help quitting, ask your doctor.  · Get at least 8 hours of sleep every night.  · Limit and deal with stress.  General instructions         · Keep a journal to find out what may bring on your migraine headaches. For example, write down:  ? What you eat and drink.  ? How much sleep you get.  ? Any change in what you eat or drink.  ? Any change in your medicines.  · If you have a migraine headache:  ? Avoid things that make your symptoms worse, such as bright lights.  ? It may help to lie down in a dark, quiet room.  ? Do not drive or use heavy machinery.  ? Ask your doctor what activities are safe for you.  · Keep all follow-up visits as told by your doctor. This is important.  Contact a doctor if:  · You get a migraine headache that is different or worse than others you have had.  · You have more than 15 headache days in one month.  Get help right away if:  · Your migraine headache gets very bad.  · Your migraine headache lasts longer than 72 hours.  · You have a fever.  · You have a stiff neck.  · You have trouble seeing.  · Your muscles feel weak or like you cannot control them.  · You start to lose your balance a lot.  · You start to have trouble walking.  · You pass out (faint).  · You have a seizure.  Summary  · A migraine headache is a very strong throbbing pain on one side or both sides of your head. These headaches can also cause other symptoms.  · This condition may be treated with medicines and changes to your lifestyle.  · Keep a journal to find out what may bring on your migraine headaches.  · Contact a doctor if you get a migraine headache that is different or worse than others you have had.  · Contact your doctor if you have more than 15  headache days in a month.  This information is not intended to replace advice given to you by your health care provider. Make sure you discuss any questions you have with your health care provider.  Document Released: 09/26/2009 Document Revised: 04/10/2020 Document Reviewed: 01/30/2020  Elsevier Patient Education © 2020 [employer]

## 2022-02-17 NOTE — PROGRESS NOTES
Subjective     Julianne Gates is a 41 y.o. female who presents with chronic migraines.    She was previously seen by Dr. Anguiano    She had Botox in December, she had some relief but was also started on Qulipta at the same time. She ran out of samples, she was seeing a NP @ [facility] and was unable to get new samples.    She is concerned because she had eye heaviness and eyebrow bilaterally with the Botox she got in December.    She does feel like the Botox helped.      Age at Onset: 37  Triggers: none   Alleviating factors:  none  Meds tried and result:         Preventative:  Medication Dose/length of treatment Result/side effects   Botox 1 treatment Helped, over 50% reduction in migraines.    Quilipta 60mg daily for about 2 months She doesn't think this helped.   Nurtec  75mg QOD Just started   Ajovy 225mg Worked very well for 2 months & then stopped working   Amitritpyline  Didn't help   Nortriptyline  Didn't help   Topamax   Speech disturbance, not helpful    Propranolol  Too tired                 Abortive:   Medication Dose/length of treatment Result/side effects   Sumatriptan   Caused rash   Nurtec 75mg Helps a lot   Rizatiptan  Rash                                    Hormones:  OCP   Caffeine use:  None   OTC medications--frequency:  None   How many days per month:  Prior to Botox 30/30 days, now with Botox 10-15/30.  Missed days of school/work in past 6 months  Characteristics:     When they first started they were exercise induced, about 1 1/2 - 2 years ago, transformed into daily pressure headaches.               A) Location:  Forehead, behind eyes and top of nose.              B)Duration:  24+ hours             C)Intensity: 8/10               D) Quality of pain: Pressure, squeezing              E) Associated Symptoms:  None   N&V: nausea  Photo/phonophobia:  Both  Aura: none   Prodrome:  None   ER/Urgent care visits in past 6 months:  None   Sleep schedule:  8 hours, regular schedule  Meals: Regular basis.  "  Exercise:  Mixed martial arts, running, peloton bike        PMH: none    Social:  Practices mixed martTellme arts., Rarely drinks, doesn't smoke or take any drugs.  She works in [employer] at [employer].    Family  Hx:  None       Review of Systems   Constitutional: Negative for fever.   HENT: Negative for congestion and sinus pain.    Eyes: Negative for blurred vision and photophobia.   Respiratory: Negative for cough.    Cardiovascular: Negative for chest pain.   Gastrointestinal: Positive for nausea.   Musculoskeletal: Negative for back pain and neck pain.   Skin: Negative for rash.   Neurological: Positive for headaches. Negative for dizziness, tingling, speech change and focal weakness.   Psychiatric/Behavioral: Negative for depression. The patient is not nervous/anxious.            Objective     /80 (BP Location: Right arm, Patient Position: Sitting, BP Cuff Size: Adult)   Pulse 80   Temp 36.2 °C (97.2 °F) (Temporal)   Resp 14   Ht 1.651 m (5' 5\")   Wt 73.9 kg (162 lb 14.7 oz)   SpO2 99%   BMI 27.11 kg/m²      PHYSICAL ASSESSMENT  Constitutional:  Alert, no apparent distress,  Psych:   mood and affect WNL  Muskuloskeletal:  Moves all extremities equally, strength 5/5 bilaterally, no drift  NEUROLOGICAL ASSESSMENT  Oriented X 4, speech fluent, naming and memory intact  CN II: Visual fields are full to confrontation. Fundoscopic exam is normal with sharp discs and no vascular changes. Pupils are 4 mm and briskly reactive to light.   CN III: IV, VI  EOMs intact, no ptosis  CN V: Facial sensation is intact to pinprick in all 3 divisions bilaterally. Corneal responses are intact.  CN VII: Face is symmetric with normal eye closure and smile.  CN VIII Hearing is normal to rubbing fingers  CN IX, X: Palate elevates symmetrically. Phonation is normal.  CN XI: Head turning and shoulder shrug are intact  CN XII: Tongue is midline with normal movements and no atrophy.                        "                       Ambulates with steady gait.                  Cardiovascular:     no peripheral edema  Neck:                     Supple  Pulmonary:            Respirations easy, l   Skin:                     No obvious rashes.        Assessment & Plan       1. Chronic migraine without aura without status migrainosus, not intractable    Continue Nurtec QOD and rescue Nurtec.   Continue Toradol 10mg 1 PO daily PRN, no more than 12 per month.       Plan for Botox:        BotoxA 145 units according to the dosing/injection paradigm currently mandated by the FDA for the management of chronic migraine. Specifically, I injected 5 units to the procerus,  a total of 20 units to the frontalis musculature, 20 units to the temporalis bilaterally, 15 units to the occipitalis bilaterally, 10 units to the cervical paraspinals bilaterally and 15 units to the trapezius musculature bilaterally.     I would skip the corrugators with the next Botox as she had very heavy eyes.         Follow up early March  for Botox or sooner if needed.     I spent 40  minutes caring for patient, my time includes reviewing the chart, obtaining current HPI, exam, discussion of disease process, ordering testing and medications and counseling.      I counseled patient on migraine triggers, lifestyle changes, medication overuse, supplements and medication side effects.

## 2022-03-08 PROBLEM — M67.431 GANGLION CYST OF WRIST, RIGHT: Status: ACTIVE | Noted: 2022-03-08

## 2022-03-14 ENCOUNTER — OFFICE VISIT (OUTPATIENT)
Dept: NEUROLOGY | Facility: MEDICAL CENTER | Age: 42
End: 2022-03-14
Attending: NURSE PRACTITIONER
Payer: COMMERCIAL

## 2022-03-14 VITALS
BODY MASS INDEX: 27.81 KG/M2 | DIASTOLIC BLOOD PRESSURE: 78 MMHG | WEIGHT: 166.89 LBS | SYSTOLIC BLOOD PRESSURE: 114 MMHG | HEIGHT: 65 IN | RESPIRATION RATE: 14 BRPM | TEMPERATURE: 98.5 F | HEART RATE: 89 BPM | OXYGEN SATURATION: 97 %

## 2022-03-14 DIAGNOSIS — G43.719 INTRACTABLE CHRONIC MIGRAINE WITHOUT AURA AND WITHOUT STATUS MIGRAINOSUS: ICD-10-CM

## 2022-03-14 DIAGNOSIS — G43.709 CHRONIC MIGRAINE W/O AURA W/O STATUS MIGRAINOSUS, NOT INTRACTABLE: ICD-10-CM

## 2022-03-14 PROCEDURE — 700111 HCHG RX REV CODE 636 W/ 250 OVERRIDE (IP): Performed by: NURSE PRACTITIONER

## 2022-03-14 PROCEDURE — 64615 CHEMODENERV MUSC MIGRAINE: CPT | Performed by: NURSE PRACTITIONER

## 2022-03-14 RX ADMIN — ONABOTULINUMTOXINA 120 UNITS: 200 INJECTION, POWDER, LYOPHILIZED, FOR SOLUTION INTRADERMAL; INTRAMUSCULAR at 17:08

## 2022-03-14 NOTE — PROGRESS NOTES
Subjective     Julianne Gates is a 41 y.o. female who presents today for Botox for chronic migraine.     This is her first Botox here.      She had Botox in December, she had some relief but was also started on Qulipta at the same time. She ran out of samples, she was seeing a NP @ [facility] and was unable to get new samples.     She is concerned because she had eye heaviness and eyebrow bilaterally with the Botox she got in December.,she only had the 4 frontalis injections, she would like to avoid her face altogether this time.      She does feel like the Botox helped.       Age at Onset: 37  Triggers: none   Alleviating factors:  none  Meds tried and result:          Preventative:  Medication Dose/length of treatment Result/side effects   Botox 1 treatment Helped, over 50% reduction in migraines.    Quilipta 60mg daily for about 2 months She doesn't think this helped.   Nurtec  75mg QOD Just started   Ajovy 225mg Worked very well for 2 months & then stopped working   Amitritpyline   Didn't help   Nortriptyline   Didn't help   Topamax    Speech disturbance, not helpful    Propranolol   Too tired                     Abortive:   Medication Dose/length of treatment Result/side effects   Sumatriptan    Caused rash   Nurtec 75mg Helps a lot   Rizatiptan   Rash                                                       Hormones:  OCP   Caffeine use:  None   OTC medications--frequency:  None   How many days per month:  Prior to Botox 30/30 days, now with Botox 10-15/30.  Missed days of school/work in past 6 months  Characteristics:     When they first started they were exercise induced, about 1 1/2 - 2 years ago, transformed into daily pressure headaches.               A) Location:  Forehead, behind eyes and top of nose.              B)Duration:  24+ hours             C)Intensity: 8/10               D) Quality of pain: Pressure, squeezing              E) Associated Symptoms:  None   N&V: nausea  Photo/phonophobia:  Both  Aura:  "none   Prodrome:  None   ER/Urgent care visits in past 6 months:  None   Sleep schedule:  8 hours, regular schedule  Meals: Regular basis.   Exercise:  Mixed martial arts, running, peloton bike           PMH: none     Social:  Practices mixed martial arts., Rarely drinks, doesn't smoke or take any drugs.  She works in [employer] at [employer].     Family  Hx:  None                  /78 (BP Location: Right arm, Patient Position: Sitting, BP Cuff Size: Adult)   Pulse 89   Temp 36.9 °C (98.5 °F) (Temporal)   Resp 14   Ht 1.651 m (5' 5\")   Wt 75.7 kg (166 lb 14.2 oz)   SpO2 97%   BMI 27.77 kg/m²        Assessment & Plan     1. Chronic migraine w/o aura w/o status migrainosus, not intractable    - onabotulinum toxin A (Botox) injection 155 Units     hronic Migraine:  Botox therapy has reduced patient’s migraines by more than 7 days and/or 100 hours per month.     I treated this patient in clinic today with BotoxA 120 units according to the dosing/injection paradigm currently mandated by the FDA for the management of chronic migraine. She chooses to skip all injections in the face, she understands that this is not in line with the PREEMPT protocol and may not get otpimal results.   I gave  20 units to the temporalis bilaterally, 15 units to the occipitalis bilaterally, 10 units to the cervical paraspinals bilaterally and 15 units to the trapezius musculature bilaterally. The remainder of the Botox 200 units mixed but not administered was discarded as wastage per FDA guidelines  Consent on file.  Patient identify verified with 2 patient identifiers.     Frequency of headaches is >15 days monthly with at least 8 migraines monthly; Migraines include at least two of the following: worsened with activity or avoidance of activity with migraines (ie they go lie down), moderate to severe pain intensity, pulsing headache, unilateral headache and has  have either nausea or vomiting OR sensitivity to " light and sound.     Although this patient is responding to botox, the patient is  NOT migraine free, however, and it is my recommendation Botox be continued at this time.    This patient has chronic migraines, defined as having 15 or more headaches days per month, 8 of which are migraines, over a minimum of the last three months. Episodes last more than 4 hours (untreated).  Pt has 2 or more of following (see initial note) -  Headache worsened with activity, pain is moderate to severe intensity, pulsing in nature, unilateral and patient either has nausea/vomiting OR sensitivity to light and sound.   This patient does not  also have medication overuse headache.  No adverse effect of Botox noted at conclusion of today's appointment.    Follow up in 12 weeks for Botox or sooner if needed.  Follow up on 3/24/2022 for office visit.

## 2022-03-14 NOTE — PATIENT INSTRUCTIONS
OnabotulinumtoxinA injection (Medical Use)  What is this medicine?  ONABOTULINUMTOXINA (o na JESSE you lye num tox in ) is a neuro-muscular blocker. This medicine is used to treat crossed eyes, eyelid spasms, severe neck muscle spasms, ankle and toe muscle spasms, and elbow, wrist, and finger muscle spasms. It is also used to treat excessive underarm sweating, to prevent chronic migraine headaches, and to treat loss of bladder control due to neurologic conditions such as multiple sclerosis or spinal cord injury.  This medicine may be used for other purposes; ask your health care provider or pharmacist if you have questions.  COMMON BRAND NAME(S): Botox  What should I tell my health care provider before I take this medicine?  They need to know if you have any of these conditions:  · breathing problems  · cerebral palsy spasms  · difficulty urinating  · heart problems  · history of surgery where this medicine is going to be used  · infection at the site where this medicine is going to be used  · myasthenia gravis or other neurologic disease  · nerve or muscle disease  · surgery plans  · take medicines that treat or prevent blood clots  · thyroid problems  · an unusual or allergic reaction to botulinum toxin, albumin, other medicines, foods, dyes, or preservatives  · pregnant or trying to get pregnant  · breast-feeding  How should I use this medicine?  This medicine is for injection into a muscle. It is given by a health care professional in a hospital or clinic setting.  Talk to your pediatrician regarding the use of this medicine in children. While this drug may be prescribed for children as young as 2 years old for selected conditions, precautions do apply.  Overdosage: If you think you have taken too much of this medicine contact a poison control center or emergency room at once.  NOTE: This medicine is only for you. Do not share this medicine with others.  What if I miss a dose?  This does not apply.  What may  interact with this medicine?  · aminoglycoside antibiotics like gentamicin, neomycin, tobramycin  · muscle relaxants  · other botulinum toxin injections  This list may not describe all possible interactions. Give your health care provider a list of all the medicines, herbs, non-prescription drugs, or dietary supplements you use. Also tell them if you smoke, drink alcohol, or use illegal drugs. Some items may interact with your medicine.  What should I watch for while using this medicine?  Visit your doctor for regular check ups.  This medicine will cause weakness in the muscle where it is injected. Tell your doctor if you feel unusually weak in other muscles. Get medical help right away if you have problems with breathing, swallowing, or talking.  This medicine might make your eyelids droop or make you see blurry or double. If you have weak muscles or trouble seeing do not drive a car, use machinery, or do other dangerous activities.  This medicine contains albumin from human blood. It may be possible to pass an infection in this medicine, but no cases have been reported. Talk to your doctor about the risks and benefits of this medicine.  If your activities have been limited by your condition, go back to your regular routine slowly after treatment with this medicine.  What side effects may I notice from receiving this medicine?  Side effects that you should report to your doctor or health care professional as soon as possible:  · allergic reactions like skin rash, itching or hives, swelling of the face, lips, or tongue  · breathing problems  · changes in vision  · chest pain or tightness  · eye irritation, pain  · fast, irregular heartbeat  · infection  · numbness  · speech problems  · swallowing problems  · unusual weakness  Side effects that usually do not require medical attention (report to your doctor or health care professional if they continue or are bothersome):  · bruising or pain at site where  injected  · drooping eyelid  · dry eyes or mouth  · headache  · muscles aches, pains  · sensitivity to light  · tearing  This list may not describe all possible side effects. Call your doctor for medical advice about side effects. You may report side effects to FDA at 6-202-GIY-0073.  Where should I keep my medicine?  This drug is given in a hospital or clinic and will not be stored at home.  NOTE: This sheet is a summary. It may not cover all possible information. If you have questions about this medicine, talk to your doctor, pharmacist, or health care provider.  © 2020 Elsevier/[employer] (2019-06-24 14:21:42)

## 2022-03-15 ENCOUNTER — TELEPHONE (OUTPATIENT)
Dept: NEUROLOGY | Facility: MEDICAL CENTER | Age: 42
End: 2022-03-15

## 2022-03-15 NOTE — TELEPHONE ENCOUNTER
NURTEC 75mg Dispersible Tablet    RTS - Next avail fill on of after 03/19/2022, make sure patient gets #16/30 Copay should be $30.00. - 03/15/2022 11:11am

## 2022-03-24 ENCOUNTER — APPOINTMENT (OUTPATIENT)
Dept: NEUROLOGY | Facility: MEDICAL CENTER | Age: 42
End: 2022-03-24
Attending: NURSE PRACTITIONER
Payer: COMMERCIAL

## 2022-04-05 ENCOUNTER — PRE-ADMISSION TESTING (OUTPATIENT)
Dept: ADMISSIONS | Facility: MEDICAL CENTER | Age: 42
End: 2022-04-05
Attending: STUDENT IN AN ORGANIZED HEALTH CARE EDUCATION/TRAINING PROGRAM
Payer: COMMERCIAL

## 2022-04-05 DIAGNOSIS — Z01.812 PRE-OPERATIVE LABORATORY EXAMINATION: ICD-10-CM

## 2022-04-05 LAB
SARS-COV-2 RNA RESP QL NAA+PROBE: NOTDETECTED
SPECIMEN SOURCE: NORMAL

## 2022-04-05 PROCEDURE — C9803 HOPD COVID-19 SPEC COLLECT: HCPCS

## 2022-04-05 PROCEDURE — U0003 INFECTIOUS AGENT DETECTION BY NUCLEIC ACID (DNA OR RNA); SEVERE ACUTE RESPIRATORY SYNDROME CORONAVIRUS 2 (SARS-COV-2) (CORONAVIRUS DISEASE [COVID-19]), AMPLIFIED PROBE TECHNIQUE, MAKING USE OF HIGH THROUGHPUT TECHNOLOGIES AS DESCRIBED BY CMS-2020-01-R: HCPCS

## 2022-04-05 PROCEDURE — U0005 INFEC AGEN DETEC AMPLI PROBE: HCPCS

## 2022-04-05 RX ORDER — MAGNESIUM OXIDE 400 MG/1
400 TABLET ORAL DAILY
COMMUNITY

## 2022-04-05 NOTE — OR NURSING
"Pt states that she had a \"head cold\" about 3 weeks ago that started on 3/20 and ended 4/1. Home COVID test was negative. COVID test done today as part of pre-op labs.   "

## 2022-04-09 ENCOUNTER — ANESTHESIA EVENT (OUTPATIENT)
Dept: SURGERY | Facility: MEDICAL CENTER | Age: 42
End: 2022-04-09
Payer: COMMERCIAL

## 2022-04-11 ENCOUNTER — ANESTHESIA (OUTPATIENT)
Dept: SURGERY | Facility: MEDICAL CENTER | Age: 42
End: 2022-04-11
Payer: COMMERCIAL

## 2022-04-11 ENCOUNTER — HOSPITAL ENCOUNTER (OUTPATIENT)
Facility: MEDICAL CENTER | Age: 42
End: 2022-04-11
Attending: STUDENT IN AN ORGANIZED HEALTH CARE EDUCATION/TRAINING PROGRAM | Admitting: STUDENT IN AN ORGANIZED HEALTH CARE EDUCATION/TRAINING PROGRAM
Payer: COMMERCIAL

## 2022-04-11 VITALS
SYSTOLIC BLOOD PRESSURE: 128 MMHG | WEIGHT: 164.02 LBS | TEMPERATURE: 97.8 F | OXYGEN SATURATION: 96 % | HEART RATE: 70 BPM | RESPIRATION RATE: 16 BRPM | HEIGHT: 65 IN | DIASTOLIC BLOOD PRESSURE: 85 MMHG | BODY MASS INDEX: 27.33 KG/M2

## 2022-04-11 DIAGNOSIS — M67.431 GANGLION CYST OF WRIST, RIGHT: ICD-10-CM

## 2022-04-11 LAB — HCG UR QL: NEGATIVE

## 2022-04-11 PROCEDURE — 700102 HCHG RX REV CODE 250 W/ 637 OVERRIDE(OP): Performed by: ANESTHESIOLOGY

## 2022-04-11 PROCEDURE — 160002 HCHG RECOVERY MINUTES (STAT): Performed by: STUDENT IN AN ORGANIZED HEALTH CARE EDUCATION/TRAINING PROGRAM

## 2022-04-11 PROCEDURE — A9270 NON-COVERED ITEM OR SERVICE: HCPCS | Performed by: ANESTHESIOLOGY

## 2022-04-11 PROCEDURE — 501838 HCHG SUTURE GENERAL: Performed by: STUDENT IN AN ORGANIZED HEALTH CARE EDUCATION/TRAINING PROGRAM

## 2022-04-11 PROCEDURE — 160048 HCHG OR STATISTICAL LEVEL 1-5: Performed by: STUDENT IN AN ORGANIZED HEALTH CARE EDUCATION/TRAINING PROGRAM

## 2022-04-11 PROCEDURE — 01810 ANES PX NRV MUSC F/ARM WRST: CPT | Performed by: ANESTHESIOLOGY

## 2022-04-11 PROCEDURE — 160009 HCHG ANES TIME/MIN: Performed by: STUDENT IN AN ORGANIZED HEALTH CARE EDUCATION/TRAINING PROGRAM

## 2022-04-11 PROCEDURE — 160035 HCHG PACU - 1ST 60 MINS PHASE I: Performed by: STUDENT IN AN ORGANIZED HEALTH CARE EDUCATION/TRAINING PROGRAM

## 2022-04-11 PROCEDURE — 81025 URINE PREGNANCY TEST: CPT

## 2022-04-11 PROCEDURE — 700111 HCHG RX REV CODE 636 W/ 250 OVERRIDE (IP): Performed by: ANESTHESIOLOGY

## 2022-04-11 PROCEDURE — 700101 HCHG RX REV CODE 250: Performed by: ANESTHESIOLOGY

## 2022-04-11 PROCEDURE — 160025 RECOVERY II MINUTES (STATS): Performed by: STUDENT IN AN ORGANIZED HEALTH CARE EDUCATION/TRAINING PROGRAM

## 2022-04-11 PROCEDURE — 500380 HCHG DRAIN, PENROSE 1/4X12: Performed by: STUDENT IN AN ORGANIZED HEALTH CARE EDUCATION/TRAINING PROGRAM

## 2022-04-11 PROCEDURE — 25111 REMOVE WRIST TENDON LESION: CPT | Mod: RT | Performed by: STUDENT IN AN ORGANIZED HEALTH CARE EDUCATION/TRAINING PROGRAM

## 2022-04-11 PROCEDURE — 160039 HCHG SURGERY MINUTES - EA ADDL 1 MIN LEVEL 3: Performed by: STUDENT IN AN ORGANIZED HEALTH CARE EDUCATION/TRAINING PROGRAM

## 2022-04-11 PROCEDURE — 700101 HCHG RX REV CODE 250: Performed by: STUDENT IN AN ORGANIZED HEALTH CARE EDUCATION/TRAINING PROGRAM

## 2022-04-11 PROCEDURE — 160028 HCHG SURGERY MINUTES - 1ST 30 MINS LEVEL 3: Performed by: STUDENT IN AN ORGANIZED HEALTH CARE EDUCATION/TRAINING PROGRAM

## 2022-04-11 PROCEDURE — 700105 HCHG RX REV CODE 258: Performed by: STUDENT IN AN ORGANIZED HEALTH CARE EDUCATION/TRAINING PROGRAM

## 2022-04-11 PROCEDURE — 160046 HCHG PACU - 1ST 60 MINS PHASE II: Performed by: STUDENT IN AN ORGANIZED HEALTH CARE EDUCATION/TRAINING PROGRAM

## 2022-04-11 RX ORDER — MIDAZOLAM HYDROCHLORIDE 1 MG/ML
INJECTION INTRAMUSCULAR; INTRAVENOUS PRN
Status: DISCONTINUED | OUTPATIENT
Start: 2022-04-11 | End: 2022-04-11 | Stop reason: HOSPADM

## 2022-04-11 RX ORDER — OXYCODONE HCL 5 MG/5 ML
5 SOLUTION, ORAL ORAL
Status: DISCONTINUED | OUTPATIENT
Start: 2022-04-11 | End: 2022-04-11 | Stop reason: HOSPADM

## 2022-04-11 RX ORDER — BUPIVACAINE HYDROCHLORIDE 2.5 MG/ML
INJECTION, SOLUTION EPIDURAL; INFILTRATION; INTRACAUDAL
Status: DISCONTINUED
Start: 2022-04-11 | End: 2022-04-11 | Stop reason: HOSPADM

## 2022-04-11 RX ORDER — KETOROLAC TROMETHAMINE 30 MG/ML
INJECTION, SOLUTION INTRAMUSCULAR; INTRAVENOUS PRN
Status: DISCONTINUED | OUTPATIENT
Start: 2022-04-11 | End: 2022-04-11 | Stop reason: SURG

## 2022-04-11 RX ORDER — HYDRALAZINE HYDROCHLORIDE 20 MG/ML
5 INJECTION INTRAMUSCULAR; INTRAVENOUS
Status: DISCONTINUED | OUTPATIENT
Start: 2022-04-11 | End: 2022-04-11 | Stop reason: HOSPADM

## 2022-04-11 RX ORDER — HYDROMORPHONE HYDROCHLORIDE 1 MG/ML
0.5 INJECTION, SOLUTION INTRAMUSCULAR; INTRAVENOUS; SUBCUTANEOUS
Status: DISCONTINUED | OUTPATIENT
Start: 2022-04-11 | End: 2022-04-11 | Stop reason: HOSPADM

## 2022-04-11 RX ORDER — DIPHENHYDRAMINE HYDROCHLORIDE 50 MG/ML
12.5 INJECTION INTRAMUSCULAR; INTRAVENOUS
Status: DISCONTINUED | OUTPATIENT
Start: 2022-04-11 | End: 2022-04-11 | Stop reason: HOSPADM

## 2022-04-11 RX ORDER — ONDANSETRON 2 MG/ML
INJECTION INTRAMUSCULAR; INTRAVENOUS PRN
Status: DISCONTINUED | OUTPATIENT
Start: 2022-04-11 | End: 2022-04-11 | Stop reason: HOSPADM

## 2022-04-11 RX ORDER — EPINEPHRINE 1 MG/ML(1)
AMPUL (ML) INJECTION
Status: DISCONTINUED
Start: 2022-04-11 | End: 2022-04-11 | Stop reason: HOSPADM

## 2022-04-11 RX ORDER — LIDOCAINE HYDROCHLORIDE 20 MG/ML
INJECTION, SOLUTION EPIDURAL; INFILTRATION; INTRACAUDAL; PERINEURAL PRN
Status: DISCONTINUED | OUTPATIENT
Start: 2022-04-11 | End: 2022-04-11 | Stop reason: SURG

## 2022-04-11 RX ORDER — BUPIVACAINE HYDROCHLORIDE AND EPINEPHRINE 2.5; 5 MG/ML; UG/ML
INJECTION, SOLUTION EPIDURAL; INFILTRATION; INTRACAUDAL; PERINEURAL
Status: DISCONTINUED | OUTPATIENT
Start: 2022-04-11 | End: 2022-04-11 | Stop reason: HOSPADM

## 2022-04-11 RX ORDER — CLINDAMYCIN PHOSPHATE 150 MG/ML
INJECTION, SOLUTION INTRAVENOUS PRN
Status: DISCONTINUED | OUTPATIENT
Start: 2022-04-11 | End: 2022-04-11 | Stop reason: SURG

## 2022-04-11 RX ORDER — OXYCODONE HCL 5 MG/5 ML
10 SOLUTION, ORAL ORAL
Status: DISCONTINUED | OUTPATIENT
Start: 2022-04-11 | End: 2022-04-11 | Stop reason: HOSPADM

## 2022-04-11 RX ORDER — HYDROMORPHONE HYDROCHLORIDE 1 MG/ML
0.2 INJECTION, SOLUTION INTRAMUSCULAR; INTRAVENOUS; SUBCUTANEOUS
Status: DISCONTINUED | OUTPATIENT
Start: 2022-04-11 | End: 2022-04-11 | Stop reason: HOSPADM

## 2022-04-11 RX ORDER — OXYCODONE HYDROCHLORIDE 5 MG/1
5 TABLET ORAL EVERY 4 HOURS PRN
Qty: 8 TABLET | Refills: 0 | Status: SHIPPED | OUTPATIENT
Start: 2022-04-11 | End: 2022-04-14

## 2022-04-11 RX ORDER — DEXAMETHASONE SODIUM PHOSPHATE 4 MG/ML
INJECTION, SOLUTION INTRA-ARTICULAR; INTRALESIONAL; INTRAMUSCULAR; INTRAVENOUS; SOFT TISSUE PRN
Status: DISCONTINUED | OUTPATIENT
Start: 2022-04-11 | End: 2022-04-11 | Stop reason: SURG

## 2022-04-11 RX ORDER — SODIUM CHLORIDE, SODIUM LACTATE, POTASSIUM CHLORIDE, CALCIUM CHLORIDE 600; 310; 30; 20 MG/100ML; MG/100ML; MG/100ML; MG/100ML
INJECTION, SOLUTION INTRAVENOUS CONTINUOUS
Status: DISCONTINUED | OUTPATIENT
Start: 2022-04-11 | End: 2022-04-11 | Stop reason: HOSPADM

## 2022-04-11 RX ORDER — ACETAMINOPHEN 500 MG
1000 TABLET ORAL ONCE
Status: COMPLETED | OUTPATIENT
Start: 2022-04-11 | End: 2022-04-11

## 2022-04-11 RX ORDER — MEPERIDINE HYDROCHLORIDE 25 MG/ML
12.5 INJECTION INTRAMUSCULAR; INTRAVENOUS; SUBCUTANEOUS
Status: DISCONTINUED | OUTPATIENT
Start: 2022-04-11 | End: 2022-04-11 | Stop reason: HOSPADM

## 2022-04-11 RX ORDER — HALOPERIDOL 5 MG/ML
1 INJECTION INTRAMUSCULAR
Status: DISCONTINUED | OUTPATIENT
Start: 2022-04-11 | End: 2022-04-11 | Stop reason: HOSPADM

## 2022-04-11 RX ORDER — CEFAZOLIN SODIUM 1 G/3ML
2 INJECTION, POWDER, FOR SOLUTION INTRAMUSCULAR; INTRAVENOUS ONCE
Status: DISCONTINUED | OUTPATIENT
Start: 2022-04-11 | End: 2022-04-11

## 2022-04-11 RX ORDER — ONDANSETRON 2 MG/ML
4 INJECTION INTRAMUSCULAR; INTRAVENOUS
Status: DISCONTINUED | OUTPATIENT
Start: 2022-04-11 | End: 2022-04-11 | Stop reason: HOSPADM

## 2022-04-11 RX ORDER — HYDROMORPHONE HYDROCHLORIDE 1 MG/ML
0.4 INJECTION, SOLUTION INTRAMUSCULAR; INTRAVENOUS; SUBCUTANEOUS
Status: DISCONTINUED | OUTPATIENT
Start: 2022-04-11 | End: 2022-04-11 | Stop reason: HOSPADM

## 2022-04-11 RX ADMIN — PROPOFOL 270 MCG/KG/MIN: 10 INJECTION, EMULSION INTRAVENOUS at 07:35

## 2022-04-11 RX ADMIN — ONDANSETRON 4 MG: 2 INJECTION INTRAMUSCULAR; INTRAVENOUS at 07:58

## 2022-04-11 RX ADMIN — CLINDAMYCIN PHOSPHATE 900 MG: 150 INJECTION, SOLUTION INTRAMUSCULAR; INTRAVENOUS at 07:32

## 2022-04-11 RX ADMIN — MIDAZOLAM HYDROCHLORIDE 2 MG: 1 INJECTION, SOLUTION INTRAMUSCULAR; INTRAVENOUS at 07:32

## 2022-04-11 RX ADMIN — DEXAMETHASONE SODIUM PHOSPHATE 8 MG: 4 INJECTION, SOLUTION INTRA-ARTICULAR; INTRALESIONAL; INTRAMUSCULAR; INTRAVENOUS; SOFT TISSUE at 07:38

## 2022-04-11 RX ADMIN — KETOROLAC TROMETHAMINE 30 MG: 30 INJECTION, SOLUTION INTRAMUSCULAR at 07:58

## 2022-04-11 RX ADMIN — FENTANYL CITRATE 100 MCG: 50 INJECTION, SOLUTION INTRAMUSCULAR; INTRAVENOUS at 07:35

## 2022-04-11 RX ADMIN — SODIUM CHLORIDE, POTASSIUM CHLORIDE, SODIUM LACTATE AND CALCIUM CHLORIDE: 600; 310; 30; 20 INJECTION, SOLUTION INTRAVENOUS at 06:42

## 2022-04-11 RX ADMIN — LIDOCAINE HYDROCHLORIDE 100 MG: 20 INJECTION, SOLUTION EPIDURAL; INFILTRATION; INTRACAUDAL at 07:35

## 2022-04-11 RX ADMIN — ACETAMINOPHEN 1000 MG: 500 TABLET ORAL at 06:42

## 2022-04-11 ASSESSMENT — PAIN SCALES - GENERAL: PAIN_LEVEL: 0

## 2022-04-11 NOTE — LETTER
March 8, 2022    Patient Name: Estelita Gates  Surgeon Name: Estelita Francois M.D.  Surgery Facility: [facility] [place] ([address] [place] [address]) [facility]  Surgery Date: 4/4/2022    The time of your surgery is not final and may change up to and until the day of your surgery. You will be contacted 24-48 hours prior to your surgery date with your check-in and surgery time.    If you will not be at one of the below numbers please call/text the surgery scheduler at [phone]  Preferred Phone: [phone]    BEFORE YOUR SURGERY  Pre Registration and/or Lab Work must be done within and no earlier than 28 days prior to your surgery date. Please call [facility] at [phone] for an appointment as soon as possible.     Please refrain from smoking any substance after midnight prior to surgery. Smoking may interfere with the anesthetic and frequently produces nausea during the recovery period.    Continue taking all lifesaving medications. Including the morning of your surgery with small sip of water.    Please read the MEDICATION INSTRUCTIONS below completely.    DAY OF YOUR SURGERY  Nothing to eat or drink after midnight     Please arrive at the hospital/surgery center at the check-in time provided.     An adult will need to bring you and take you home after your surgery.     AFTER YOUR SURGERY  Post op Appointment:   Date: 4/19/2022   Time: 8:15am   With: Estelita Francois M.D.   Location: [address] [place] [id number]    - Post Surgery - You will need someone to drive you home  - Post Surgery - You will need someone to stay with you for 24 hours  - You must have someone provide transportation post surgery and someone to monitor you for at least 24 hours post-surgery. If you don't have either of these your appointment will be canceled.    TIME OFF WORK  FMLA or Disability forms can be faxed directly to: [phone] or you may drop them off at [address] [place] [address]  [place] [address]. Our office charges a $35.00 fee per form. Forms will be completed within 10 business days of drop off and payment received. For the status of your forms you may contact our disability office directly [phone].    MEDICATION INSTRUCTIONS  The following medications should be stopped a minimum of 10 days prior to surgery:  All over the counter, Supplements & Herbal medications    Anorectics: Phentermine (Adipex-P, Lomaira and Suprenza), Phentermine-topiramate (Qsymia), Bupropion-naltrexone (Contrave)    Opiod Partial Agonists/Opioid Antagonists: Buprenorphine (Subocone, Belbuca, Butrans, Probuphine Implant, Sublocade), Naltrexone (ReVia, Vivitrol), Naloxone    Amphetamines: Dextroamphetamine/Amphetamine (Adderall, Mydayis), Methylphenidate Hydrochloride (Concerta, Metadate, Methylin, Ritalin)    The following medications should be stopped 5 days prior to surgery:  Blood Thinners: Any Aspirin, Aspirin products, anti-inflammatories such as ibuprofen and any blood thinners such as Coumadin and Plavix. Please consult your prescribing physician if you are on life saving blood thinners, in regards to when to stop medications prior to surgery.     The following medications should be stopped a minimum of 3 days prior to surgery:  PDE-5 inhibitors: Sildenafil (Viagra), Tadalafil (Cialis), Vardenafil (Levitra), Avanafil (Stendra)    MAO Inhibitors: Rasagiline (Azilect), Selegiline (Eldepryl, Emsam, Selapar), Isocarboxazid (Marplan), Phenelzine (Nardil)

## 2022-04-11 NOTE — DISCHARGE INSTRUCTIONS
ACTIVITY: Rest and take it easy for the first 24 hours.  A responsible adult is recommended to remain with you during that time.  It is normal to feel sleepy.  We encourage you to not do anything that requires balance, judgment or coordination.    MILD FLU-LIKE SYMPTOMS ARE NORMAL. YOU MAY EXPERIENCE GENERALIZED MUSCLE ACHES, THROAT IRRITATION, HEADACHE AND/OR SOME NAUSEA.    FOR 24 HOURS DO NOT:  Drive, operate machinery or run household appliances.  Drink beer or alcoholic beverages.   Make important decisions or sign legal documents.    SPECIAL INSTRUCTIONS:   **Refer to Dr. Herndon's DC Instructions Above**    DIET: To avoid nausea, slowly advance diet as tolerated, avoiding spicy or greasy foods for the first day.  Add more substantial food to your diet according to your physician's instructions.  Babies can be fed formula or breast milk as soon as they are hungry.  INCREASE FLUIDS AND FIBER TO AVOID CONSTIPATION.    SURGICAL DRESSING/BATHING: May shower starting tomorrow.  KEEP Dressing covered and dry.      FOLLOW-UP APPOINTMENT:  April 28th at 10:00 am    You should CALL YOUR PHYSICIAN if you develop:  Fever greater than 101 degrees F.  Pain not relieved by medication, or persistent nausea or vomiting.  Excessive bleeding (blood soaking through dressing) or unexpected drainage from the wound.  Extreme redness or swelling around the incision site, drainage of pus or foul smelling drainage.  Inability to urinate or empty your bladder within 8 hours.  Problems with breathing or chest pain.    You should call 911 if you develop problems with breathing or chest pain.  If you are unable to contact your doctor or surgical center, you should go to the nearest emergency room or urgent care center.  Physician's telephone #: Dr. Edge [phone]    If any questions arise, call your doctor.  If your doctor is not available, please feel free to call the Surgical Center at [phone].     A registered nurse may  call you a few days after your surgery to see how you are doing after your procedure.    MEDICATIONS: Resume taking daily medication.  Take prescribed pain medication with food.  If no medication is prescribed, you may take non-aspirin pain medication if needed.  PAIN MEDICATION CAN BE VERY CONSTIPATING.  Take a stool softener or laxative such as senokot, pericolace, or milk of magnesia if needed.    Prescription given for Oxycodone.    Last pain medication given at   · Tylenol at 6:45 am- next okay time to take 12:45 pm  · Toradol (like ibuprofen) at 8:00 am - next okay time to take 4:00 pm.      Depression / Suicide Risk    As you are discharged from this [facility] facility, it is important to learn how to keep safe from harming yourself.    Recognize the warning signs:  · Abrupt changes in personality, positive or negative- including increase in energy   · Giving away possessions  · Change in eating patterns- significant weight changes-  positive or negative  · Change in sleeping patterns- unable to sleep or sleeping all the time   · Unwillingness or inability to communicate  · Depression  · Unusual sadness, discouragement and loneliness  · Talk of wanting to die  · Neglect of personal appearance   · Rebelliousness- reckless behavior  · Withdrawal from people/activities they love  · Confusion- inability to concentrate     If you or a loved one observes any of these behaviors or has concerns about self-harm, here's what you can do:  · Talk about it- your feelings and reasons for harming yourself  · Remove any means that you might use to hurt yourself (examples: pills, rope, extension cords, firearm)  · Get professional help from the community (Mental Health, Substance Abuse, psychological counseling)  · Do not be alone:Call your Safe Contact- someone whom you trust who will be there for you.  · Call your local CRISIS HOTLINE 993-0693 or 417-762-7543  · Call your local Children's Mobile Crisis Response Team  [facility] (788) 035-0571 or www.[employer]  · Call the toll free National Suicide Prevention Hotlines   · National Suicide Prevention Lifeline 943-602-PDDW (6831)  · National Hope Line Network 800-SUICIDE (661-0253)

## 2022-04-11 NOTE — ANESTHESIA PROCEDURE NOTES
Airway    Date/Time: 4/11/2022 7:35 AM  Performed by: Boyd Richter M.D.  Authorized by: Boyd Richter M.D.     Location:  OR  Urgency:  Elective  Indications for Airway Management:  Anesthesia      Spontaneous Ventilation: absent    Sedation Level:  Deep  Preoxygenated: Yes    Final Airway Type:  Supraglottic airway  Final Supraglottic Airway:  Standard LMA    SGA Size:  3  Number of Attempts at Approach:  1   Atraumatic insertion, good seal

## 2022-04-11 NOTE — ANESTHESIA PREPROCEDURE EVALUATION
Case: [id number] Date/Time: 04/11/22 0715    Procedure: EXCISION OF GANGLION CYST RIGHT WRIST (Right )    Diagnosis: Ganglion cyst of wrist, right [M67.431]    Pre-op diagnosis: Ganglion cyst of wrist, right [M67.431]    Location: [facility] ROOM 25 / SURGERY SAME DAY [facility]    Surgeons: Estelita Francois M.D.        Denies: MI/CHF/smoking/CVA/DM/CKD      Relevant Problems   NEURO   (positive) Headache, migraine      CARDIAC   (positive) Headache, migraine       Physical Exam    Airway   Mallampati: II  TM distance: >3 FB  Neck ROM: full       Cardiovascular - normal exam  Rhythm: regular  Rate: normal  (-) murmur     Dental - normal exam           Pulmonary - normal exam  Breath sounds clear to auscultation     Abdominal    Neurological - normal exam                 Anesthesia Plan    ASA 1       Plan - general       Airway plan will be LMA          Induction: intravenous    Postoperative Plan: Postoperative administration of opioids is intended.    Pertinent diagnostic labs and testing reviewed    Informed Consent:    Anesthetic plan and risks discussed with patient.    Use of blood products discussed with: patient whom consented to blood products.

## 2022-04-11 NOTE — OP REPORT
OPERATIVE NOTE  Date of procedure: 04/11/22    Pre-operative Diagnosis   1.  right wrist volar ganglion            Post-operative diagnosis   1.  right wrist volar ganglion         Procedure   1.  Excision of right volar wrist ganglion cyst  (CPT code 79638)        Surgeon   1. Estelita Edge MD       Indication   Estelita Gates is a 41 y.o. female who presents with a painful, persistent right volar wrist ganglion.  They have exhausted all non-operative measures.  The patient is indicated for the above stated procedure.     Anesthesia   general    Tourniquet Time   Tourniquet was inflated to 250 mm Hg for 14 minutes     Complications   None    Implants  None      Informed Consent   Patient was told of the risks, benefits, alternative to the procedure.  Risks included, but not limited to, infection, wound healing issues, injury to neurovascular and tendinous structures, recurrence, incomplete resolution of their symptoms, the need for subsequent surgeries.  Advanced directive were discussed, team approach explained, they understand the role of overlapping surgeries, the use of medical photography was reviewed.  The patient consented and wished to proceed.         Procedural Pause   The patient's correct identity, side, site, procedure to be performed was verified.  Intravenous antibiotics were not administered.         Procedural Note   The patient was brought to the operating room, positioned supine with their arm on an arm table, and were secured with safety straps.  A well-padded, nonsterile tourniquet was applied to the upper arm.  Our anesthesia colleagues then placed the patient under general anesthesia.  At which point the operative extremity was prepped and draped in standard sterile fashion. The upper extremity was exsanguinated with Esmarch and tourniquet was inflated to 250 mm of Hg.      The ganglion was exposed through a 2-3cm transverse incision in the volar wrist directly overtop of the  ganglion.  Skin incision was made with a #15 blade.  Subcutaneous dissection down to the ganglion was performed with tenotomy scissors under loupe magnification, taking care to preserve overlying superficial nerves and veins. The palmar cutaneous nerve was identified ulnarly, and the DRSN was identified radially and protected for the remainder of the case. The radial artery and venae comitans were identified proximally, and the cyst dissected carefully away from the vascular structures. The vessels were gently retracted, and the stalk of the ganglion cyst was then followed down to the volar wrist capsule.  The stalk was excised by making a small ligament sparing capsulectomy (parallel to the LRL and the RSC ligaments) with a #15 blade.  Any local synovitis was excised with a synovial rongeur taking care to protect any underlying wrist ligaments.  The specimen was incised, and gelatinous material expressed, consistent with a ganglion cyst.  The specimen was therefore discarded. At this point the tourniquet was deflated and hemostasis was obtained with bipolar cautery and pressure.  The wounds were all thoroughly irrigated with sterile saline.  All skin incisions were closed with interrupted 4-0 Monocryl deep dermal sutures and a 4-0 Monocryl running subcuticular stitch.    The final skin dressing consisted of Xeroform, 4 x 4 gauze, and a well-padded, volar resting splint.     The patient tolerated the procedure well and was awakened from general anesthesia without any known difficulties. They were transferred to the PACU in stable condition without any known complications.  All needle counts were correct.       Post-operative Plan   - The patient will remain an outpatient   - The patient was instructed to continue to move the exposed fingers and to keep the hand elevated for edema control   - The patient will be seen in 10-14 days for wound check and suture removal

## 2022-04-11 NOTE — DISCHARGE INSTR - OTHER INFO
DR. HOYT'S POST-OPERATIVE INSTRUCTIONS    You have just undergone a sugery by Dr. Hoyt in the operating room.  It is our wish that your postoperative recovery be as quick and comfortable as possible.  Please carefully review the following items that are important for your recovery.    After any operation, a certain degree of pain is to be expected. Take Advil (ibuprofen) and Extra Strength Tylenol as first line medications for mild to moderate pain. Taking each one every 6 hours, and staggering them so that you are taking one medicine every 3 hours, is the most effective. Refer to dosing instructions on the bottle, but in general ibuprofen dose is 600-800mg and Tylenol dose is 500-1000mg. For most small procedures, this should be enough to keep you comfortable. Take these medications until your followup visit. You may have been given a small prescription for stronger pain medicine which will help relieve more severe pain.  Pain medicine may make you drowsy so please keep this in mind.  Do not drive while taking pain medicine.      When you go home, please keep your operated arm elevated at all times (above the level of your heart).  If you do this, your swelling will resolve more quickly and your pain will be improve more quickly as well. You may also place an ice pack over your dressing or splint to help with swelling and pain.    You have a splint on. This should remain on, clean and dry, until your follow up appointment. If you feel that your dressing is too tight during the first 3 days after surgery, you may loosen the outer layer. Make sure that your splint is kept dry at all times. You can take a shower if you cover your arm with a plastic bag.     If your fingers are exposed (not covered in a dressing or splint) please continue to move them to prevent stiffness.     Follow up after surgery is  typically 10-14 days, unless you were specifically instructed otherwise. If you have any questions about your appointment time or to confirm your appointment, please call our office at [phone].     At your first follow up appointment, the sutures will be removed and you may be asked to see a hand therapist to optimize your functional result. Each of the hand therapists that you will be referred to have received special training in the care of the hand and upper extremity.    If you have questions regarding your surgery postop that you feel requires attention, please call the office at [phone] during business hours, or [phone] after hours for the answering service. If you feel that you have a surgical emergency postoperatively that requires immediate attention, please call the above numbers or go to the Emergency Department.

## 2022-04-11 NOTE — OR NURSING
0809 - patient arrived to pacu.  2 identifiers verified, attached to monitors, alarms/parameters verified, and received report.  OPA in place, unlabored respiration with equal chest rise and fall.  Dressing to right upper extremity from hand to mid forearm.  Clean, dry, and intact.  Fingers exposed - cap refill < 3 seconds and warm to touch.     0831- OPA removed.  Patient declines pain and nausea.    0841- tolerating po intake.  Declines pain and nausea.     0850- phoned patient's spouse for updates.      0900- patient assisted to edge of bed.  Changing into personal clothing without need for assistance.      0910- reviewed DC instructions with patient's spouse.  All questions answered.      0915- patient discharged via wheelchair with CNA to spouse.  All belongings accounted for.

## 2022-04-11 NOTE — LETTER
March 9, 2022    Patient Name: Estelita Gates  Surgeon Name: Estelita Francois M.D.  Surgery Facility: [facility]note 2] ([address] [place] [address])  Surgery Date: 4/11/2022    The time of your surgery is not final and may change up to and until the day of your surgery. You will be contacted 24-48 hours prior to your surgery date with your check-in and surgery time.    If you will not be at one of the below numbers please call/text the surgery scheduler at [phone]  Preferred Phone: [phone]    BEFORE YOUR SURGERY  Pre Registration and/or Lab Work must be done within and no earlier than 28 days prior to your surgery date. Please call [facility][note 2] at [phone] for an appointment as soon as possible.     Please refrain from smoking any substance after midnight prior to surgery. Smoking may interfere with the anesthetic and frequently produces nausea during the recovery period.    Continue taking all lifesaving medications. Including the morning of your surgery with small sip of water.    Please read the MEDICATION INSTRUCTIONS below completely.    DAY OF YOUR SURGERY  Nothing to eat or drink after midnight     Please arrive at the hospital/surgery center at the check-in time provided.     An adult will need to bring you and take you home after your surgery.     AFTER YOUR SURGERY  Post op Appointment:   Date: ***   Time: ***   With: Estelita Francois M.D.   Location: {[facility] Pre/Post-op appointment locations:[address]230}    {AFTER YOUR SURGERY (Optional):39015}    TIME OFF WORK  FMLA or Disability forms can be faxed directly to: [phone] or you may drop them off at {[facility] Drop FMLA paperwork at:13513}. Our office charges a $35.00 fee per form. Forms will be completed within 10 business days of drop off and payment received. For the status of your forms you may contact our disability office directly [phone].    MEDICATION INSTRUCTIONS  The following medications  should be stopped a minimum of 10 days prior to surgery:  All over the counter, Supplements & Herbal medications    Anorectics: Phentermine (Adipex-P, Lomaira and Suprenza), Phentermine-topiramate (Qsymia), Bupropion-naltrexone (Contrave)    Opiod Partial Agonists/Opioid Antagonists: Buprenorphine (Subocone, Belbuca, Butrans, Probuphine Implant, Sublocade), Naltrexone (ReVia, Vivitrol), Naloxone    Amphetamines: Dextroamphetamine/Amphetamine (Adderall, Mydayis), Methylphenidate Hydrochloride (Concerta, Metadate, Methylin, Ritalin)    The following medications should be stopped 5 days prior to surgery:  Blood Thinners: Any Aspirin, Aspirin products, anti-inflammatories such as ibuprofen and any blood thinners such as Coumadin and Plavix. Please consult your prescribing physician if you are on life saving blood thinners, in regards to when to stop medications prior to surgery.     The following medications should be stopped a minimum of 3 days prior to surgery:  PDE-5 inhibitors: Sildenafil (Viagra), Tadalafil (Cialis), Vardenafil (Levitra), Avanafil (Stendra)    MAO Inhibitors: Rasagiline (Azilect), Selegiline (Eldepryl, Emsam, Selapar), Isocarboxazid (Marplan), Phenelzine (Nardil)

## 2022-04-11 NOTE — ANESTHESIA POSTPROCEDURE EVALUATION
Patient: Estelita Gates    Procedure Summary     Date: 04/11/22 Room / Location: [facility] ROOM 25 / SURGERY SAME DAY [facility]    Anesthesia Start: 0732 Anesthesia Stop: 0812    Procedure: EXCISION OF GANGLION CYST RIGHT WRIST (Right Wrist) Diagnosis:       Ganglion cyst of wrist, right      (Ganglion cyst of wrist, right [M67.431])    Surgeons: Estelita Francois M.D. Responsible Provider: Boyd Richter M.D.    Anesthesia Type: general ASA Status: 1          Final Anesthesia Type: general  Last vitals  BP   Blood Pressure: 114/69    Temp   36.6 °C (97.8 °F)    Pulse   86   Resp   15    SpO2   91 %      Anesthesia Post Evaluation    Patient location during evaluation: PACU  Patient participation: complete - patient participated  Level of consciousness: awake and alert  Pain score: 0    Airway patency: patent  Anesthetic complications: no  Cardiovascular status: hemodynamically stable  Respiratory status: acceptable  Hydration status: euvolemic    PONV: none          No complications documented.     Nurse Pain Score: 0 (NPRS)

## 2022-04-11 NOTE — ANESTHESIA TIME REPORT
Anesthesia Start and Stop Event Times     Date Time Event    4/11/2022 0652 Ready for Procedure     0732 Anesthesia Start     0812 Anesthesia Stop        Responsible Staff  04/11/22    Name Role Begin End    Boyd Richter M.D. Anesth 0732 0812        Overtime Reason:  no overtime (within assigned shift)    Comments:

## 2022-04-11 NOTE — H&P
Surgery Orthopedic History & Physical Note    Date  4/11/2022    Primary Care Physician  Pavithra Hadley M.D.    CC  Pre-Op Diagnosis Codes:     * Ganglion cyst of wrist, right [M67.431]    HPI  This is a 41 y.o. female who presented with a right volar radial cyst. No change since she was last seen in clinic.   He denies any recent HA, F/C, N/V, abd pain, urinary s/s, cough/congestion, SOB, CP.       Past Medical History:   Diagnosis Date   • Acute nasopharyngitis 04/05/2022    3 weeks ago       Past Surgical History:   Procedure Laterality Date   • OTHER ORTHOPEDIC SURGERY Right 03/2020    ACL   • OTHER ORTHOPEDIC SURGERY Left 04/2017    ACL       Current Facility-Administered Medications   Medication Dose Route Frequency Provider Last Rate Last Admin   • lactated ringers infusion   Intravenous Continuous Estelita Francois M.D. 10 mL/hr at 04/11/22 0642 New Bag at 04/11/22 0642   • BUPIVACAINE HCL (PF) 0.25 % INJ SOLN            • EPINEPHRINE HCL 1 MG/ML INJ SOLN                Social History     Socioeconomic History   • Marital status:      Spouse name: Not on file   • Number of children: Not on file   • Years of education: Not on file   • Highest education level: Not on file   Occupational History   • Not on file   Tobacco Use   • Smoking status: Never Smoker   • Smokeless tobacco: Never Used   Vaping Use   • Vaping Use: Never used   Substance and Sexual Activity   • Alcohol use: Yes     Alcohol/week: 1.8 oz     Types: 3 Cans of beer per week     Comment: 2 drinks/week   • Drug use: Never   • Sexual activity: Not on file   Other Topics Concern   • Not on file   Social History Narrative   • Not on file     Social Determinants of Health     Financial Resource Strain: Not on file   Food Insecurity: Not on file   Transportation Needs: Not on file   Physical Activity: Not on file   Stress: Not on file   Social Connections: Not on file   Intimate Partner Violence: Not on file   Housing Stability: Not  on file       History reviewed. No pertinent family history.    Allergies  Ceclor [cefaclor] and Penicillins    Review of Systems  Negative except for above    Physical Exam    Vital Signs  Blood Pressure: 126/92   Temperature: 36.4 °C (97.6 °F)   Pulse: 66   Respiration: 15   Pulse Oximetry: 98 %   2cm right volar radial ganglion cyst.     Labs:                    Radiology:  No orders to display         Assessment/Plan:  Pre-Op Diagnosis Codes:     * Ganglion cyst of wrist, right [M67.431]  Procedure(s):  EXCISION OF GANGLION CYST RIGHT WRIST

## 2022-06-08 ENCOUNTER — OFFICE VISIT (OUTPATIENT)
Dept: NEUROLOGY | Facility: MEDICAL CENTER | Age: 42
End: 2022-06-08
Attending: PSYCHIATRY & NEUROLOGY
Payer: COMMERCIAL

## 2022-06-08 VITALS
TEMPERATURE: 97.4 F | DIASTOLIC BLOOD PRESSURE: 84 MMHG | OXYGEN SATURATION: 98 % | BODY MASS INDEX: 27.66 KG/M2 | SYSTOLIC BLOOD PRESSURE: 126 MMHG | HEIGHT: 65 IN | WEIGHT: 166.01 LBS | HEART RATE: 66 BPM

## 2022-06-08 DIAGNOSIS — G43.709 CHRONIC MIGRAINE W/O AURA W/O STATUS MIGRAINOSUS, NOT INTRACTABLE: Primary | ICD-10-CM

## 2022-06-08 PROCEDURE — 64615 CHEMODENERV MUSC MIGRAINE: CPT | Performed by: PSYCHIATRY & NEUROLOGY

## 2022-06-08 PROCEDURE — 700111 HCHG RX REV CODE 636 W/ 250 OVERRIDE (IP): Performed by: PSYCHIATRY & NEUROLOGY

## 2022-06-08 RX ADMIN — ONABOTULINUMTOXINA 155 UNITS: 200 INJECTION, POWDER, LYOPHILIZED, FOR SOLUTION INTRADERMAL; INTRAMUSCULAR at 10:04

## 2022-06-08 NOTE — PROGRESS NOTES
[facility]  BOTOX PROCEDURE NOTE    Chronic Migraine:  Botox therapy has reduced patient’s migraines by more than 7 days and/or 100 hours per month.     I treated Estelita Gates in clinic today with BotoxA 155 units according to the dosing/injection paradigm currently mandated by the FDA for the management of chronic migraine.  Specifically, I injected:  - 5 units to the procerus,  - 5 units to the corrugators (bilaterally),  - a total of 20 units to the frontalis musculature,  - 20 units to the temporalis (bilaterally),  - 15 units to the occipitalis (bilaterally),  - 10 units to the cervical paraspinals (bilaterally), and  - 15 units to the trapezius musculature (bilaterally).    The remainder of the Botox was discarded as wastage per FDA guidelines  Consent on file.  Patient identify verified with 2 patient identifiers.     Frequency of headaches is >15 days monthly with at least 8 migraines monthly.    Migraines include at least two of the following: worsened with activity or avoidance of activity with migraines (ie they go lie down), moderate to severe pain intensity, pulsing headache, unilateral headache and has  have either nausea or vomiting OR sensitivity to light and sound.     Although Estelita Gates is responding to botox she is NOT migraine free.  I recommend that Botox be continued at this time.    Estelita Gates has chronic migraines, defined as having 15 or more headaches days per month, 8 of which are migraines, over a minimum of the last three months.  Episodes last more than 4 hours (untreated).  Pt has 2 or more of following (see initial note):  - headache worsened with activity  - pain is moderate to severe intensity  - pulsing in nature  - unilateral   and patient either has nausea/vomiting OR sensitivity to light and sound.    No adverse effect of Botox noted at conclusion of today's appointment.    Follow up in 12 weeks for Botox or sooner if needed.    Signed: Willis  PAOLO Jones M.D.

## 2022-06-27 ENCOUNTER — TELEPHONE (OUTPATIENT)
Dept: NEUROLOGY | Facility: MEDICAL CENTER | Age: 42
End: 2022-06-27
Payer: COMMERCIAL

## 2022-06-27 NOTE — TELEPHONE ENCOUNTER
I reached out to Julianne via telephone.  There was no answer.  I left a brief message explaining that there is no way to reverse Botox.  Symptoms will resolve with time.    If she is experiencing aesthetically-displeasing lateral eyebrow elevation, this could be treated with additional Botox injected in the lateral frontalis.  This can be given at her next scheduled Botox appointment.    Willis Jones M.D.

## 2022-06-29 DIAGNOSIS — G43.709 CHRONIC MIGRAINE W/O AURA W/O STATUS MIGRAINOSUS, NOT INTRACTABLE: ICD-10-CM

## 2022-06-29 RX ORDER — RIMEGEPANT SULFATE 75 MG/75MG
75 TABLET, ORALLY DISINTEGRATING ORAL
Qty: 8 TABLET | Refills: 11 | Status: SHIPPED | OUTPATIENT
Start: 2022-06-29 | End: 2022-07-08

## 2022-06-30 ENCOUNTER — TELEPHONE (OUTPATIENT)
Dept: NEUROLOGY | Facility: MEDICAL CENTER | Age: 42
End: 2022-06-30

## 2022-06-30 NOTE — TELEPHONE ENCOUNTER
NURTEC 75mg Dispersible Tablet    PA submitted via CMM  (Key: [id number]) awaiting response TAT 24-72 hrs.  - 2022 10:08am    PA approved request Reference Number: [id number]. NURTEC TAB 75MG ODT is approved through 2023, #8/30 DS Max 30 DS notifying liaison Sonia Gomez  - 2022 3:52pm

## 2022-07-05 ENCOUNTER — TELEPHONE (OUTPATIENT)
Dept: NEUROLOGY | Facility: MEDICAL CENTER | Age: 42
End: 2022-07-05
Payer: COMMERCIAL

## 2022-07-05 NOTE — TELEPHONE ENCOUNTER
Contacted patient at [phone] to discuss [facility] and services/benefits offered. No answer, left voicemail.    Venus Sloan  Rx Coordinator   [phone]

## 2022-07-08 DIAGNOSIS — G43.719 INTRACTABLE CHRONIC MIGRAINE WITHOUT AURA AND WITHOUT STATUS MIGRAINOSUS: ICD-10-CM

## 2022-07-08 DIAGNOSIS — G43.709 CHRONIC MIGRAINE WITHOUT AURA WITHOUT STATUS MIGRAINOSUS, NOT INTRACTABLE: ICD-10-CM

## 2022-07-11 ENCOUNTER — PHARMACY VISIT (OUTPATIENT)
Dept: PHARMACY | Facility: MEDICAL CENTER | Age: 42
End: 2022-07-11
Payer: COMMERCIAL

## 2022-07-11 PROCEDURE — RXMED WILLOW AMBULATORY MEDICATION CHARGE: Performed by: NURSE PRACTITIONER

## 2022-08-22 PROCEDURE — RXMED WILLOW AMBULATORY MEDICATION CHARGE: Performed by: NURSE PRACTITIONER

## 2022-08-23 ENCOUNTER — PHARMACY VISIT (OUTPATIENT)
Dept: PHARMACY | Facility: MEDICAL CENTER | Age: 42
End: 2022-08-23
Payer: COMMERCIAL

## 2022-09-06 ENCOUNTER — TELEPHONE (OUTPATIENT)
Dept: NEUROLOGY | Facility: MEDICAL CENTER | Age: 42
End: 2022-09-06

## 2022-09-06 ENCOUNTER — OFFICE VISIT (OUTPATIENT)
Dept: NEUROLOGY | Facility: MEDICAL CENTER | Age: 42
End: 2022-09-06
Attending: NURSE PRACTITIONER
Payer: COMMERCIAL

## 2022-09-06 VITALS
RESPIRATION RATE: 16 BRPM | DIASTOLIC BLOOD PRESSURE: 72 MMHG | TEMPERATURE: 96.9 F | BODY MASS INDEX: 33.26 KG/M2 | HEART RATE: 73 BPM | OXYGEN SATURATION: 97 % | SYSTOLIC BLOOD PRESSURE: 120 MMHG | HEIGHT: 65 IN | WEIGHT: 199.63 LBS

## 2022-09-06 DIAGNOSIS — G43.709 CHRONIC MIGRAINE WITHOUT AURA WITHOUT STATUS MIGRAINOSUS, NOT INTRACTABLE: ICD-10-CM

## 2022-09-06 PROCEDURE — 64615 CHEMODENERV MUSC MIGRAINE: CPT | Performed by: NURSE PRACTITIONER

## 2022-09-06 PROCEDURE — 700101 HCHG RX REV CODE 250: Performed by: NURSE PRACTITIONER

## 2022-09-06 PROCEDURE — 700111 HCHG RX REV CODE 636 W/ 250 OVERRIDE (IP): Performed by: NURSE PRACTITIONER

## 2022-09-06 RX ADMIN — SODIUM CHLORIDE 120 UNITS: 9 INJECTION INTRAMUSCULAR; INTRAVENOUS; SUBCUTANEOUS at 17:01

## 2022-09-06 NOTE — TELEPHONE ENCOUNTER
Aimovig 140mg/ml SOAJ    PA submitted via CM (Key: [id number]) awaiting response TAT 24-72 hrs. - 09/06/2022 3:22pm

## 2022-09-06 NOTE — PROGRESS NOTES
"Subjective     Julianne Gates is a 41 y.o. female who presents for Botox for chronic migraine.        With the Botox she has about 4-5 migraine per week the first 2 months, then increases to about 10 per month the last month.  Prior to Botox she had daily migraines.       Her last Botox was on 6/8/2022, she again had the heavy eyes, eyelid droop, eyebrow droop, under eye baggage.   She would like to totally avoid the face.       She tried Ajovy in the past and worked very well for a short period of time, she would like to try another CGRP.      Objective     /72 (BP Location: Right arm, Patient Position: Sitting, BP Cuff Size: Adult)   Pulse 73   Temp 36.1 °C (96.9 °F) (Temporal)   Resp 16   Ht 1.651 m (5' 5\")   Wt 90.5 kg (199 lb 10 oz)   SpO2 97%   BMI 33.22 kg/m²          Assessment & Plan     1. Chronic migraine without aura without status migrainosus, not intractable    - onabotulinum toxin A (Botox) injection 120 Units      Chronic Migraine:  Botox therapy has reduced patient’s migraines by more than 7 days and/or 100 hours per month.     I treated this patient in clinic today with BotoxA 120 units according to the dosing/injection paradigm currently mandated by the FDA for the management of chronic migraine. Specifically, She chooses to avoid the face all together. , 20 units to the temporalis bilaterally, 15 units to the occipitalis bilaterally, 10 units to the cervical paraspinals bilaterally and 15 units to the trapezius musculature bilaterally. The remainder of the Botox 200 units mixed but not administered was discarded as wastage per FDA guidelines  Consent on file.  Patient identify verified with 2 patient identifiers.     Frequency of headaches is >15 days monthly with at least 8 migraines monthly; Migraines include at least two of the following: worsened with activity or avoidance of activity with migraines (ie they go lie down), moderate to severe pain intensity, pulsing headache, " unilateral headache and has  have either nausea or vomiting OR sensitivity to light and sound.     Although this patient is responding to botox, the patient is  NOT migraine free, however, and it is my recommendation Botox be continued at this time.    This patient has chronic migraines, defined as having 15 or more headaches days per month, 8 of which are migraines, over a minimum of the last three months. Episodes last more than 4 hours (untreated).  Pt has 2 or more of following (see initial note) -  Headache worsened with activity, pain is moderate to severe intensity, pulsing in nature, unilateral and patient either has nausea/vomiting OR sensitivity to light and sound.   This patient does not also have medication overuse headache.     No adverse effect of Botox noted at conclusion of today's appointment.    Follow up 12 weeks  for Botox or sooner if needed.      Aimovig 140mg SC Q 12 weeks.

## 2022-09-08 PROBLEM — T84.84XA PAINFUL ORTHOPAEDIC HARDWARE (HCC): Status: ACTIVE | Noted: 2022-09-08

## 2022-09-08 PROBLEM — M67.469: Status: ACTIVE | Noted: 2022-09-08

## 2022-09-08 PROBLEM — M65.961 SYNOVITIS OF RIGHT KNEE: Status: ACTIVE | Noted: 2022-09-08

## 2022-09-08 PROBLEM — M94.261 CHONDROMALACIA OF RIGHT KNEE: Status: ACTIVE | Noted: 2022-09-08

## 2022-09-08 PROBLEM — M65.9 SYNOVITIS OF RIGHT KNEE: Status: ACTIVE | Noted: 2022-09-08

## 2022-09-12 ENCOUNTER — PHARMACY VISIT (OUTPATIENT)
Dept: PHARMACY | Facility: MEDICAL CENTER | Age: 42
End: 2022-09-12
Payer: COMMERCIAL

## 2022-09-12 PROCEDURE — RXMED WILLOW AMBULATORY MEDICATION CHARGE: Performed by: NURSE PRACTITIONER

## 2022-09-13 ENCOUNTER — DOCUMENTATION (OUTPATIENT)
Dept: PHARMACY | Facility: MEDICAL CENTER | Age: 42
End: 2022-09-13
Payer: COMMERCIAL

## 2022-09-18 NOTE — PROGRESS NOTES
PHARMACIST PRE SCREEN - **New Onboarding**     List Drug Allergies: Cefaclor; penicillins  List Non-Drug Allergies: nka  List ICD-10: G43.709  List Diagnosis: Chronic migraine without aura without status migrainosus, not intractable  List Goals of Therapy:  · To reduce migraine frequency, duration, and severity  · To improve acute medication responsiveness and reduce need for acute attacks  · To identify and modify migraine triggers  Drug Therapy (name/formulation/dose/route of admin/freq): Aimovig 140mg/mL auto-injector, 1 pen SC Q 4 weeks    Appropriateness Review (Y/N + If being prescribed off label, notate supporting reference): Y     Dose appropriateness (Y/N + BSA, height/weight if applicable): Y     Any Renal/Hepatic adjustments needed (Y/N + explain)? N     Comorbidity and Past Medical History reviewed in EMR. Any comorbidities, PMH, precautions, or contraindications that pose medication safety concern (Y/N + document and reach out to provider if appropriate)? N     Any relevant lab work needed that affect the initial start date (Y/N + document and reach out to provider if appropriate)?  N    EMR med list reviewed. List DDI’s: None    Patient’s ability to self-administer medication: No issues identified per EMR    PHARMACIST NEW START - Migraine Call Review   List ICD-10: G43.709  List Diagnosis: Chronic migraine without aura without status migrainosus, not intractable    Tx prescribed: Aimovig 140mg/mL auto-injector, 1 pen SC Q 4 weeks    Duration of therapy: until progression, toxicity, or no longer clinically beneficial    Past Txt: Ajovy; Excedrin migraine; Triptans;   Med Rec/Updated drug list: EMR accurate, no medication changes. Reviewed with patient/caregiver.  Common DI Avoidance:   Acute Episodic Medication Use:      Advil - 600mg - 800mg     Excedrin ineffective     Nurtec  Other Pertinent Migraine Med: Botox  DI Check: none    Goals of Therapy:   · To reduce migraine frequency, duration, and  severity   · To improve acute medication responsiveness and reduce need for acute attacks   · To identify and modify migraine triggers  Patient has agreed to/understands goals of therapy during education/counseling   S/Sx(Baseline)    # of Migraine Days Past 30 Days: 5-10 more so when working out and with heat     Migraine Symptoms:     ? Throbbing pain across the front of the head like a tension HA  across the temples     ? Nausea    ? No vision changes    ? Occasional dizziness    ? Nurtec seems to help    Migraine Pain Severity: no longer in the 10s because of botox but they can range from slightly at a 2 to a 7     ? Not very often a 7     Average Duration of Migraine (Hours): anywhere from 8-12 hrs even with episodic use   Labs no recent    CBC:     Chem7:     LFTs:     BP/HR: (9/6/22) wnl    Admin/Schedule: Aimovig 140mg/mL auto-injector, 1 pen SC Q 4 weeks    Inj technique: familiar with injection site cleaning and selection from Ajovy use. Prior to injecting to bring to room temp over 30 minutes but  do not shake, hold, or heat. Compared an contrasted Ajovy pen to Aimovig and advised the start button is at the opposite end of the needle tip. Will still apply pressure into the body at a 90 deg angle but will need to fully press into the body enough that the needle shield is covered by skin prior to pressing start button. Maintain pressure during admin until 2nd click is heard.   Adherence: advised to use a calendar to track dosing dates **adherence prediction tool: low risk for nonadherence    Missed dose mgmt:  Inject dose ASAP if within 24 - 48 hrs of typical dosing time and can maintain current  dosing schedule. If it is has been more than 48 hrs, to dose ASAP and adjust following dose 28 days out (i.e. dosing schedule will shift)    Barriers (if exist): none     New:   List Common SE Reviewed:     Constipation: Increasing hydration, can also use OTC products if needed    Injection Site Reactions  (swelling, bruising, pain, irritation): ice before/after admin; avoiding/limiting touching inj site after; inj site rotation; top cortisone; oral non-drowsy antihistamine. Advised compatibly Aimovig is a fully humanized antibody and has less ISR statistically than Ajovy.      List Serious SE Reviewed/Precautions:     Hypertension (HTN)/high blood pressure: Continue to monitor BP if needed and report to provider of new high readings. Aimovig can lead to elevated BP with use.     Hypersensitivity: Report to MD if any presentation/development of: hives; fever; joint pain; swelling of the tongue/face/lips; SOB/difficulty breathing.  Proper Handling/Storage/Excursion/Disposal:     store Aimovig in fridge, protect from heat, light, and freezing.    Bring to room temp over 30 minutes but  do not shake, hold, or heat.     Once brought to room temp not return to refrigeration, can be kept at room temp (max of 77F) for up to 7 days    When traveling to carry on with her and not place into checked luggage.     Plans on disposing pens into sharps container at work.      Wellness/Lifestyle:      Triggers: excessive heat and intense exercise - typical onset is at least an hour after finishing her workout). Has taken her blood pressure after these workouts and shared it's wnl, reviewed hypertension risk above.     Diet/hydration: adq hydration with exercise plan, avoids     Exercise: Has a vigorous regimen o fMMA/boxing, Peloton bike/running  Risk for falls (use STEADI): n/a  Vaccines: deferred**  ADLs:  none  Additional: Had a nice talk with Zhanna and welcomed her to our services. I had advised it can take up to 3 months to see full benefits and reviewed goals of therapy. She was thankful for the review and the troubleshooting tips, agreeable to future calls from clinical [facility] team.

## 2022-09-28 ENCOUNTER — DOCUMENTATION (OUTPATIENT)
Dept: PHARMACY | Facility: MEDICAL CENTER | Age: 42
End: 2022-09-28
Payer: COMMERCIAL

## 2022-09-28 NOTE — PROGRESS NOTES
PHARMACIST FOLLOW UP - **Unable to reach - Chart Review and Refill Activity evaluation**  List ICD-10: G43.709  List Diagnosis: Chronic migraine without aura without status migrainosus, not intractable    No Updates or Outside Providers     Review of refill assessment - any changes to dose, new medication, new allergy/health condition: N  I have evaluated data from the Refill Activity. Last appointment with specialty provider was 9/6/22.

## 2022-10-07 ENCOUNTER — PHARMACY VISIT (OUTPATIENT)
Dept: PHARMACY | Facility: MEDICAL CENTER | Age: 42
End: 2022-10-07
Payer: COMMERCIAL

## 2022-10-07 PROCEDURE — RXMED WILLOW AMBULATORY MEDICATION CHARGE: Performed by: NURSE PRACTITIONER

## 2022-10-10 ENCOUNTER — PRE-ADMISSION TESTING (OUTPATIENT)
Dept: ADMISSIONS | Facility: MEDICAL CENTER | Age: 42
End: 2022-10-10
Attending: ORTHOPAEDIC SURGERY
Payer: COMMERCIAL

## 2022-10-25 ENCOUNTER — ANESTHESIA EVENT (OUTPATIENT)
Dept: SURGERY | Facility: MEDICAL CENTER | Age: 42
End: 2022-10-25
Payer: COMMERCIAL

## 2022-10-26 ENCOUNTER — ANESTHESIA (OUTPATIENT)
Dept: SURGERY | Facility: MEDICAL CENTER | Age: 42
End: 2022-10-26
Payer: COMMERCIAL

## 2022-10-26 ENCOUNTER — HOSPITAL ENCOUNTER (OUTPATIENT)
Facility: MEDICAL CENTER | Age: 42
End: 2022-10-26
Attending: ORTHOPAEDIC SURGERY | Admitting: ORTHOPAEDIC SURGERY
Payer: COMMERCIAL

## 2022-10-26 VITALS
OXYGEN SATURATION: 94 % | HEART RATE: 100 BPM | TEMPERATURE: 97.5 F | WEIGHT: 165.34 LBS | BODY MASS INDEX: 27.55 KG/M2 | HEIGHT: 65 IN | RESPIRATION RATE: 16 BRPM | SYSTOLIC BLOOD PRESSURE: 140 MMHG | DIASTOLIC BLOOD PRESSURE: 89 MMHG

## 2022-10-26 DIAGNOSIS — M65.9 SYNOVITIS OF RIGHT KNEE: ICD-10-CM

## 2022-10-26 DIAGNOSIS — M67.469: ICD-10-CM

## 2022-10-26 DIAGNOSIS — T84.84XA PAINFUL ORTHOPAEDIC HARDWARE (HCC): ICD-10-CM

## 2022-10-26 DIAGNOSIS — M94.261 CHONDROMALACIA OF RIGHT KNEE: ICD-10-CM

## 2022-10-26 LAB
FUNGUS SPEC FUNGUS STN: NORMAL
GRAM STN SPEC: NORMAL
HCG UR QL: NEGATIVE
SIGNIFICANT IND 70042: NORMAL
SIGNIFICANT IND 70042: NORMAL
SITE SITE: NORMAL
SITE SITE: NORMAL
SOURCE SOURCE: NORMAL
SOURCE SOURCE: NORMAL

## 2022-10-26 PROCEDURE — 160041 HCHG SURGERY MINUTES - EA ADDL 1 MIN LEVEL 4: Performed by: ORTHOPAEDIC SURGERY

## 2022-10-26 PROCEDURE — 160048 HCHG OR STATISTICAL LEVEL 1-5: Performed by: ORTHOPAEDIC SURGERY

## 2022-10-26 PROCEDURE — 700101 HCHG RX REV CODE 250: Performed by: ORTHOPAEDIC SURGERY

## 2022-10-26 PROCEDURE — 160002 HCHG RECOVERY MINUTES (STAT): Performed by: ORTHOPAEDIC SURGERY

## 2022-10-26 PROCEDURE — C1713 ANCHOR/SCREW BN/BN,TIS/BN: HCPCS | Performed by: ORTHOPAEDIC SURGERY

## 2022-10-26 PROCEDURE — 87070 CULTURE OTHR SPECIMN AEROBIC: CPT

## 2022-10-26 PROCEDURE — 700111 HCHG RX REV CODE 636 W/ 250 OVERRIDE (IP): Performed by: ORTHOPAEDIC SURGERY

## 2022-10-26 PROCEDURE — 87205 SMEAR GRAM STAIN: CPT

## 2022-10-26 PROCEDURE — 160009 HCHG ANES TIME/MIN: Performed by: ORTHOPAEDIC SURGERY

## 2022-10-26 PROCEDURE — 160035 HCHG PACU - 1ST 60 MINS PHASE I: Performed by: ORTHOPAEDIC SURGERY

## 2022-10-26 PROCEDURE — 20680 REMOVAL OF IMPLANT DEEP: CPT | Performed by: ORTHOPAEDIC SURGERY

## 2022-10-26 PROCEDURE — 160025 RECOVERY II MINUTES (STATS): Performed by: ORTHOPAEDIC SURGERY

## 2022-10-26 PROCEDURE — 20680 REMOVAL OF IMPLANT DEEP: CPT | Mod: ASROC | Performed by: PHYSICIAN ASSISTANT

## 2022-10-26 PROCEDURE — 700111 HCHG RX REV CODE 636 W/ 250 OVERRIDE (IP): Performed by: ANESTHESIOLOGY

## 2022-10-26 PROCEDURE — 27638 REMOVE/GRAFT LEG BONE LESION: CPT | Mod: ASROC,RT | Performed by: PHYSICIAN ASSISTANT

## 2022-10-26 PROCEDURE — 700105 HCHG RX REV CODE 258: Performed by: ORTHOPAEDIC SURGERY

## 2022-10-26 PROCEDURE — 29881 ARTHRS KNE SRG MNISECTMY M/L: CPT | Performed by: ORTHOPAEDIC SURGERY

## 2022-10-26 PROCEDURE — 87075 CULTR BACTERIA EXCEPT BLOOD: CPT

## 2022-10-26 PROCEDURE — 01400 ANES OPN/ARTHRS KNEE JT NOS: CPT | Performed by: ANESTHESIOLOGY

## 2022-10-26 PROCEDURE — 81025 URINE PREGNANCY TEST: CPT

## 2022-10-26 PROCEDURE — 87102 FUNGUS ISOLATION CULTURE: CPT

## 2022-10-26 PROCEDURE — 160029 HCHG SURGERY MINUTES - 1ST 30 MINS LEVEL 4: Performed by: ORTHOPAEDIC SURGERY

## 2022-10-26 PROCEDURE — 29881 ARTHRS KNE SRG MNISECTMY M/L: CPT | Mod: ASROC | Performed by: PHYSICIAN ASSISTANT

## 2022-10-26 PROCEDURE — 160046 HCHG PACU - 1ST 60 MINS PHASE II: Performed by: ORTHOPAEDIC SURGERY

## 2022-10-26 PROCEDURE — 27638 REMOVE/GRAFT LEG BONE LESION: CPT | Mod: RT | Performed by: ORTHOPAEDIC SURGERY

## 2022-10-26 PROCEDURE — 700101 HCHG RX REV CODE 250: Performed by: ANESTHESIOLOGY

## 2022-10-26 DEVICE — GRAFT BONE CANCELLOUS FREEZE DRIED CHIPS ALLOSOURCE 15CC (1EA): Type: IMPLANTABLE DEVICE | Site: KNEE | Status: FUNCTIONAL

## 2022-10-26 RX ORDER — LABETALOL HYDROCHLORIDE 5 MG/ML
5 INJECTION, SOLUTION INTRAVENOUS
Status: DISCONTINUED | OUTPATIENT
Start: 2022-10-26 | End: 2022-10-26 | Stop reason: HOSPADM

## 2022-10-26 RX ORDER — ACETAMINOPHEN 500 MG
1000 TABLET ORAL ONCE
Status: DISCONTINUED | OUTPATIENT
Start: 2022-10-26 | End: 2022-10-26 | Stop reason: HOSPADM

## 2022-10-26 RX ORDER — HYDRALAZINE HYDROCHLORIDE 20 MG/ML
5 INJECTION INTRAMUSCULAR; INTRAVENOUS
Status: DISCONTINUED | OUTPATIENT
Start: 2022-10-26 | End: 2022-10-26 | Stop reason: HOSPADM

## 2022-10-26 RX ORDER — OXYCODONE HCL 5 MG/5 ML
10 SOLUTION, ORAL ORAL
Status: DISCONTINUED | OUTPATIENT
Start: 2022-10-26 | End: 2022-10-26 | Stop reason: HOSPADM

## 2022-10-26 RX ORDER — ONDANSETRON 2 MG/ML
4 INJECTION INTRAMUSCULAR; INTRAVENOUS
Status: DISCONTINUED | OUTPATIENT
Start: 2022-10-26 | End: 2022-10-26 | Stop reason: HOSPADM

## 2022-10-26 RX ORDER — OXYCODONE HCL 5 MG/5 ML
5 SOLUTION, ORAL ORAL
Status: DISCONTINUED | OUTPATIENT
Start: 2022-10-26 | End: 2022-10-26 | Stop reason: HOSPADM

## 2022-10-26 RX ORDER — LIDOCAINE HYDROCHLORIDE AND EPINEPHRINE 10; 10 MG/ML; UG/ML
INJECTION, SOLUTION INFILTRATION; PERINEURAL
Status: DISCONTINUED | OUTPATIENT
Start: 2022-10-26 | End: 2022-10-26 | Stop reason: HOSPADM

## 2022-10-26 RX ORDER — ELUXADOLINE 100 MG/1
100 TABLET, FILM COATED ORAL DAILY
COMMUNITY
End: 2023-10-23

## 2022-10-26 RX ORDER — LIDOCAINE HYDROCHLORIDE 20 MG/ML
INJECTION, SOLUTION EPIDURAL; INFILTRATION; INTRACAUDAL; PERINEURAL PRN
Status: DISCONTINUED | OUTPATIENT
Start: 2022-10-26 | End: 2022-10-26 | Stop reason: SURG

## 2022-10-26 RX ORDER — CLINDAMYCIN PHOSPHATE 600 MG/50ML
600 INJECTION, SOLUTION INTRAVENOUS ONCE
Status: DISCONTINUED | OUTPATIENT
Start: 2022-10-26 | End: 2022-10-26 | Stop reason: HOSPADM

## 2022-10-26 RX ORDER — DIPHENHYDRAMINE HYDROCHLORIDE 50 MG/ML
12.5 INJECTION INTRAMUSCULAR; INTRAVENOUS
Status: DISCONTINUED | OUTPATIENT
Start: 2022-10-26 | End: 2022-10-26 | Stop reason: HOSPADM

## 2022-10-26 RX ORDER — HALOPERIDOL 5 MG/ML
1 INJECTION INTRAMUSCULAR
Status: DISCONTINUED | OUTPATIENT
Start: 2022-10-26 | End: 2022-10-26 | Stop reason: HOSPADM

## 2022-10-26 RX ORDER — HYDROMORPHONE HYDROCHLORIDE 1 MG/ML
0.4 INJECTION, SOLUTION INTRAMUSCULAR; INTRAVENOUS; SUBCUTANEOUS
Status: DISCONTINUED | OUTPATIENT
Start: 2022-10-26 | End: 2022-10-26 | Stop reason: HOSPADM

## 2022-10-26 RX ORDER — MIDAZOLAM HYDROCHLORIDE 1 MG/ML
INJECTION INTRAMUSCULAR; INTRAVENOUS PRN
Status: DISCONTINUED | OUTPATIENT
Start: 2022-10-26 | End: 2022-10-26 | Stop reason: SURG

## 2022-10-26 RX ORDER — ONDANSETRON 2 MG/ML
INJECTION INTRAMUSCULAR; INTRAVENOUS PRN
Status: DISCONTINUED | OUTPATIENT
Start: 2022-10-26 | End: 2022-10-26 | Stop reason: SURG

## 2022-10-26 RX ORDER — KETOROLAC TROMETHAMINE 30 MG/ML
INJECTION, SOLUTION INTRAMUSCULAR; INTRAVENOUS PRN
Status: DISCONTINUED | OUTPATIENT
Start: 2022-10-26 | End: 2022-10-26 | Stop reason: SURG

## 2022-10-26 RX ORDER — ONDANSETRON 4 MG/1
4 TABLET, ORALLY DISINTEGRATING ORAL EVERY 6 HOURS PRN
Qty: 10 TABLET | Refills: 0 | Status: SHIPPED | OUTPATIENT
Start: 2022-10-26 | End: 2023-03-13

## 2022-10-26 RX ORDER — SODIUM CHLORIDE, SODIUM LACTATE, POTASSIUM CHLORIDE, CALCIUM CHLORIDE 600; 310; 30; 20 MG/100ML; MG/100ML; MG/100ML; MG/100ML
INJECTION, SOLUTION INTRAVENOUS CONTINUOUS
Status: DISCONTINUED | OUTPATIENT
Start: 2022-10-26 | End: 2022-10-26

## 2022-10-26 RX ORDER — SODIUM CHLORIDE, SODIUM LACTATE, POTASSIUM CHLORIDE, CALCIUM CHLORIDE 600; 310; 30; 20 MG/100ML; MG/100ML; MG/100ML; MG/100ML
INJECTION, SOLUTION INTRAVENOUS CONTINUOUS
Status: DISCONTINUED | OUTPATIENT
Start: 2022-10-26 | End: 2022-10-26 | Stop reason: HOSPADM

## 2022-10-26 RX ORDER — HYDROCODONE BITARTRATE AND ACETAMINOPHEN 5; 325 MG/1; MG/1
1-2 TABLET ORAL EVERY 6 HOURS PRN
Qty: 25 TABLET | Refills: 0 | Status: SHIPPED | OUTPATIENT
Start: 2022-10-26 | End: 2022-10-31

## 2022-10-26 RX ORDER — DEXAMETHASONE SODIUM PHOSPHATE 4 MG/ML
INJECTION, SOLUTION INTRA-ARTICULAR; INTRALESIONAL; INTRAMUSCULAR; INTRAVENOUS; SOFT TISSUE PRN
Status: DISCONTINUED | OUTPATIENT
Start: 2022-10-26 | End: 2022-10-26 | Stop reason: SURG

## 2022-10-26 RX ORDER — HYDROMORPHONE HYDROCHLORIDE 1 MG/ML
0.1 INJECTION, SOLUTION INTRAMUSCULAR; INTRAVENOUS; SUBCUTANEOUS
Status: DISCONTINUED | OUTPATIENT
Start: 2022-10-26 | End: 2022-10-26 | Stop reason: HOSPADM

## 2022-10-26 RX ORDER — ROPIVACAINE HYDROCHLORIDE 5 MG/ML
INJECTION, SOLUTION EPIDURAL; INFILTRATION; PERINEURAL
Status: DISCONTINUED | OUTPATIENT
Start: 2022-10-26 | End: 2022-10-26 | Stop reason: HOSPADM

## 2022-10-26 RX ORDER — HYDROMORPHONE HYDROCHLORIDE 1 MG/ML
0.2 INJECTION, SOLUTION INTRAMUSCULAR; INTRAVENOUS; SUBCUTANEOUS
Status: DISCONTINUED | OUTPATIENT
Start: 2022-10-26 | End: 2022-10-26 | Stop reason: HOSPADM

## 2022-10-26 RX ORDER — CEFAZOLIN SODIUM 1 G/3ML
2 INJECTION, POWDER, FOR SOLUTION INTRAMUSCULAR; INTRAVENOUS ONCE
Status: DISCONTINUED | OUTPATIENT
Start: 2022-10-26 | End: 2022-10-26

## 2022-10-26 RX ADMIN — EPHEDRINE SULFATE 10 MG: 50 INJECTION, SOLUTION INTRAVENOUS at 07:28

## 2022-10-26 RX ADMIN — KETOROLAC TROMETHAMINE 30 MG: 30 INJECTION, SOLUTION INTRAMUSCULAR at 07:57

## 2022-10-26 RX ADMIN — DEXAMETHASONE SODIUM PHOSPHATE 8 MG: 4 INJECTION, SOLUTION INTRA-ARTICULAR; INTRALESIONAL; INTRAMUSCULAR; INTRAVENOUS; SOFT TISSUE at 07:15

## 2022-10-26 RX ADMIN — FENTANYL CITRATE 100 MCG: 50 INJECTION, SOLUTION INTRAMUSCULAR; INTRAVENOUS at 07:04

## 2022-10-26 RX ADMIN — CLINDAMYCIN PHOSPHATE 900 MG: 150 INJECTION, SOLUTION INTRAMUSCULAR; INTRAVENOUS at 07:07

## 2022-10-26 RX ADMIN — LIDOCAINE HYDROCHLORIDE 100 MG: 20 INJECTION, SOLUTION EPIDURAL; INFILTRATION; INTRACAUDAL at 07:10

## 2022-10-26 RX ADMIN — PROPOFOL 200 MG: 10 INJECTION, EMULSION INTRAVENOUS at 07:10

## 2022-10-26 RX ADMIN — MIDAZOLAM HYDROCHLORIDE 2 MG: 1 INJECTION, SOLUTION INTRAMUSCULAR; INTRAVENOUS at 07:02

## 2022-10-26 RX ADMIN — ONDANSETRON 4 MG: 2 INJECTION INTRAMUSCULAR; INTRAVENOUS at 07:19

## 2022-10-26 RX ADMIN — SODIUM CHLORIDE, POTASSIUM CHLORIDE, SODIUM LACTATE AND CALCIUM CHLORIDE: 600; 310; 30; 20 INJECTION, SOLUTION INTRAVENOUS at 07:00

## 2022-10-26 RX ADMIN — EPHEDRINE SULFATE 10 MG: 50 INJECTION, SOLUTION INTRAVENOUS at 07:42

## 2022-10-26 RX ADMIN — SODIUM CHLORIDE, POTASSIUM CHLORIDE, SODIUM LACTATE AND CALCIUM CHLORIDE: 600; 310; 30; 20 INJECTION, SOLUTION INTRAVENOUS at 07:57

## 2022-10-26 ASSESSMENT — PAIN DESCRIPTION - PAIN TYPE
TYPE: SURGICAL PAIN
TYPE: SURGICAL PAIN

## 2022-10-26 NOTE — OR NURSING
0804- Patient arrived from OR via gurney.  R knee dressing CDI; pedal pulse 2+.     Sedation/Resp Status: Non responsive to verbal with no OPA in place Respirations spontaneous and non-labored.    HR 82SR; VSS on 6L 02 via simple mask.  The patient does not appear to be in pain or nauseous as evidence by sleeping.     0815- Handoff report given to Nacho TYLER

## 2022-10-26 NOTE — ANESTHESIA TIME REPORT
Anesthesia Start and Stop Event Times     Date Time Event    10/26/2022 0650 Ready for Procedure     0700 Anesthesia Start     0808 Anesthesia Stop        Responsible Staff  10/26/22    Name Role Begin End    Alex Perez M.D. Anesth 0700 0808        Overtime Reason:  no overtime (within assigned shift)    Comments:                                                       No

## 2022-10-26 NOTE — ANESTHESIA POSTPROCEDURE EVALUATION
Patient: Estelita Gates    Procedure Summary     Date: 10/26/22 Room / Location:  OR  / SURGERY [facility]    Anesthesia Start: 0700 Anesthesia Stop: 0808    Procedures:       Knee arthroscopy with chondroplasty, limited synovectomy, open cyst excision with hardware removal and bone grafting, surgeries as indicated (Right: Knee)      CHONDROPLASTY (Right: Knee)      SYNOVECTOMY (Right: Knee)      EXCISION, CYST (Right: Knee)      REMOVAL, HARDWARE (Right: Knee)      BONE GRAFT (Right: Knee) Diagnosis:       Painful orthopaedic hardware (HCC)      Ganglion of lower leg      Chondromalacia of right knee      Synovitis of right knee      (Painful orthopaedic hardware (HCC) [T84.84XA])    Surgeons: Ventura Dela Cruz M.D. Responsible Provider: Alex Perez M.D.    Anesthesia Type: general ASA Status: 1          Final Anesthesia Type: general  Last vitals  BP   Blood Pressure: (!) 140/89    Temp   36.4 °C (97.5 °F)    Pulse   100   Resp   16    SpO2   94 %      Anesthesia Post Evaluation    Patient location during evaluation: PACU  Patient participation: complete - patient participated  Level of consciousness: awake and alert    Airway patency: patent  Anesthetic complications: no  Cardiovascular status: hemodynamically stable  Respiratory status: acceptable  Hydration status: euvolemic    PONV: none          No notable events documented.     Nurse Pain Score: 0 (NPRS)

## 2022-10-26 NOTE — ANESTHESIA PREPROCEDURE EVALUATION
Case: [id number] Date/Time: 10/26/22 0645    Procedures:       Knee arthroscopy with chondroplasty, limited synovectomy, open cyst excision with hardware removal and bone grafting, surgeries as indicated (Right)      CHONDROPLASTY      SYNOVECTOMY      EXCISION, CYST      REMOVAL, HARDWARE      BONE GRAFT    Diagnosis:       Painful orthopaedic hardware (HCC) [T84.84XA]      Ganglion of lower leg [M67.469]      Chondromalacia of right knee [M94.261]      Synovitis of right knee [M65.9]    Pre-op diagnosis: Painful orthopaedic hardware (HCC) [T84.84XA]    Location:  OR  / SURGERY [facility]    Surgeons: Ventura Dela Cruz M.D.          Relevant Problems   NEURO   (positive) Headache, migraine      CARDIAC   (positive) Headache, migraine      Other   (positive) Chondromalacia of right knee   (positive) Painful orthopaedic hardware (HCC)   (positive) Synovitis of right knee       Physical Exam    Airway   Mallampati: II  TM distance: >3 FB  Neck ROM: full       Cardiovascular - normal exam  Rhythm: regular  Rate: normal  (-) murmur     Dental - normal exam           Pulmonary - normal exam  Breath sounds clear to auscultation     Abdominal    Neurological - normal exam                 Anesthesia Plan    ASA 1       Plan - general       Airway plan will be LMA          Induction: intravenous    Postoperative Plan: Postoperative administration of opioids is intended.    Pertinent diagnostic labs and testing reviewed    Informed Consent:    Anesthetic plan and risks discussed with patient.    Use of blood products discussed with: patient whom consented to blood products.

## 2022-10-26 NOTE — OP REPORT
DATE OF OPERATION: 10/26/2022    SURGEON: Ventura Dela Cruz MD     ASSISTANT: MYLES Drew    PREOPERATIVE DIAGNOSES:  1.  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   2.  Right knee status post ACL graft that is intact.  3.  Right knee synovitis.    POSTOPERATIVE DIAGNOSES:  1.  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   2.  Right knee status post ACL graft that is intact.  3.  Right knee synovitis.  4.  Right knee medial meniscus tear.  5.  Right knee chondromalacia patella.  6.  Right knee retained hardware, painful.    PROCEDURE:  1.  Right knee arthroscopy with partial medial meniscectomy chondroplasty.  2.  Right knee curettage and allograft bone grafting to the tibial bone cyst.  3.  Right knee hardware removal.    ANESTHESIA: Alex Perez MD    ANESTHETIC: LMA anesthesia along with a local injection.    INDICATIONS: The patient has had knee pain for quite some time.  The patient developed a ganglion on the tibial cyst that appeared to be coming off the absorbable screw.  She has failed   nonoperative treatment and elected to proceed with operative intervention.  The patient  was explained the risks, benefits, alternatives, procedure and recovery in   detail. All questions were answered. Informed consent was obtained. Site   verification per protocol was obtained in the pre-op holding area and the operating   room. Patient received appropriate preoperative antibiotics.    OPERATION: After successful anesthesia, the patient's knee was examined. The patient  had a stable ligamentous exam and good range of motion. The leg was then prepped   and draped in the usual sterile fashion.  The previous tibial incision was opened.  There was an obvious ganglion cyst.  This was excised and there was ganglion type fluid.  This was cultured.  This did not look infectious.  The old sutures from the ACL were identified and removed which led up to the tunnel.  The tunnel was slightly opened.  The previous bio composite  screw had mostly absorbed.  There was still some screw fragments that were removed with a curette.  I curettaged the bone cyst.  Once I had that completely cleaned out I irrigated with copious amounts of saline and packed it with cortical cancellous chips.  A bone tamp was used.  I was happy with that portion of the procedure.  It was irrigated out and closed with 2-0 Vicryl and a 3-0 subcuticular Prolene.  Mastisol and Steri-Strips were applied.  Next, an anterolateral portal was   established with a knife and blunt trocar into the suprapatellar pouch. The   suprapatellar pouch and the medial and lateral gutters were free of abnormalities.   The patella had some mild grade II chondromalacia on the central ridge. This was   gently debrided with a shaver from multiple portals. I then probed it and   felt the cartilage was stable. The patellofemoral joint otherwise tracked well. The   trochlea had normal articular cartilage. The medial joint line had a small anterior horn tear of   the medial meniscus. A partial medial meniscectomy was performed both with yoli and   baskets from multiple portals. I then probed the meniscus and it felt stable. The   articular cartilage on the medial femoral condyle had near normal articular cartilage. The notch revealed a intact ACL graft with some minor fraying and PCL to visual   inspection and probing.  There really was no synovitis.  The anterior fraying on the ACL graft was gently debrided.  The ACL graft had good tension.  The lateral compartment had normal articular   cartilage and the meniscus was normal to visual inspection and probing.  The posterior medial recess and the posterior lateral recesses were normal.  The popliteus was normal.  At   that point, I was satisfied with the procedure. I lavaged out the joint,   suctioned out the fluid, closed the portals with 3-0 Prolene in an interrupted   fashion. Xeroform, 4x4s, and an ACE wrap were applied.   Patient was  transferred to recovery room in stable condition.    ESTIMATED BLOOD LOSS: Minimal.    COMPLICATIONS: None.    COMPONENTS USED: Allograft cortical cancellous bone chips    MYLES Drew  was medically necessary for the case. They helped with   instrumentation, retraction, and positioning. Without their help, the case   would not have been as technically successful.        ____________________________________  SHUBHAM PETTIT MD

## 2022-10-26 NOTE — DISCHARGE INSTR - OTHER INFO
The patient may remove the dressings and shower 2 days after surgery with incisions uncovered. Leave the sutures and steri strips in place.  Do not submerge the incisions for two weeks. Place band aids on small incisions or clean gauze and an ace wrap on larger incisions after showering. Ice and elevate the extremity. Weight bear as tolerated. Take 81 mg of enteric-coated aspirin daily for 10 days. Follow up appointment should be scheduled for 7-10 days after surgery. Call or text [phone] for questions or problems.

## 2022-10-26 NOTE — ANESTHESIA PROCEDURE NOTES
Airway    Date/Time: 10/26/2022 7:11 AM  Performed by: Alex Perez M.D.  Authorized by: Alex Perez M.D.     Location:  OR  Urgency:  Elective  Difficult Airway: No    Indications for Airway Management:  Anesthesia      Spontaneous Ventilation: absent    Sedation Level:  Deep  Preoxygenated: Yes    Mask Difficulty Assessment:  0 - not attempted  Final Airway Type:  Supraglottic airway  Final Supraglottic Airway:  Standard LMA    SGA Size:  4  Number of Attempts at Approach:  1

## 2022-10-26 NOTE — DISCHARGE INSTRUCTIONS
The patient may remove the dressings and shower 2 days after surgery with incisions uncovered. Leave the sutures and steri strips in place.  Do not submerge the incisions for two weeks. Place band aids on small incisions or clean gauze and an ace wrap on larger incisions after showering. Ice and elevate the extremity. Weight bear as tolerated. Take 81 mg of enteric-coated aspirin daily for 10 days. Follow up appointment should be scheduled for 7-10 days after surgery. Call or text [phone] for questions or problems.

## 2022-10-26 NOTE — LETTER
September 22, 2022    Patient Name: Estelita Gates  Surgeon Name: Ventura Dela Cruz M.D.  Surgery Facility: [facility] ([address] [place])  Surgery Date: 10/26/2022    The time of your surgery is not final and may change up to and until the day of your surgery. You will be contacted 24-48 hours prior to your surgery date with your check-in and surgery time.    If you will not be at one of the below numbers please call the surgery scheduler at [phone]  Preferred Phone: [phone]    BEFORE YOUR SURGERY   Pre Registration and/or Lab Work must be done within and no earlier than 28 days prior to your surgery date. Please call [facility] at [phone] for an appointment as soon as possible.    DAY OF YOUR SURGERY  Nothing to eat or drink after midnight     Refrain from smoking any substance after midnight prior to surgery. Smoking may interfere with the anesthetic and frequently produces nausea during the recovery period.    Continue taking all lifesaving medications. Including the morning of your surgery with small sip of water.    Please do NOT take on the day of surgery:  Diuretics: examples- furosemide (Lasix), spironolactone, hydrochlorothiazide  ACE-inhibitors: examples- lisinopril, ramipril, enalapril  “ARBs”: examples- losartan, Olmesartan, valsartan    Please arrive at the hospital/surgery center at the check-in time provided.     An adult will need to bring you and take you home after your surgery.     AFTER YOUR SURGERY  Post op Appointment:   Date: 11/03/2022   Time: 09:30AM   With: Ventura Dela Cruz M.D.   Location: [address] [place] [address] [place] [id number]    - Post Surgery - You will need someone to drive you home  - Post Surgery - You will need someone to stay with you for 24 hours  - You must have someone provide transportation post surgery and someone to monitor you for at least 24 hours post-surgery. If you don't have either of these  your appointment will be canceled.    TIME OFF WORK  FMLA or Disability forms can be faxed directly to: [phone] or you may drop them off at [address] [address]. Our office charges a $35.00 fee per form. Forms will be completed within 10 business days of drop off and payment received. For the status of your forms you may contact our disability office directly [phone].    MEDICATION INSTRUCTIONS **Please read section completely**    The following medications should be stopped a minimum of 10 days prior to surgery:  All over the counter, Supplements & Herbal medications    Anorectics: Phentermine (Adipex-P, Lomaira and Suprenza), Phentermine-topiramate (Qsymia), Bupropion-naltrexone (Contrave)    Opiod Partial Agonists/Opioid Antagonists: Buprenorphine (Subocone, Belbuca, Butrans, Probuphine Implant, Sublocade), Naltrexone (ReVia, Vivitrol), Naloxone    Amphetamines: Dextroamphetamine/Amphetamine (Adderall, Mydayis), Methylphenidate Hydrochloride (Concerta, Metadate, Methylin, Ritalin)    The following medications should be stopped 5 days prior to surgery:  Blood Thinners: Any Aspirin, Aspirin products, anti-inflammatories such as ibuprofen and any blood thinners such as Coumadin and Plavix. Please consult your prescribing physician if you are on life saving blood thinners, in regards to when to stop medications prior to surgery.     The following medications should be stopped a minimum of 3 days prior to surgery:  PDE-5 inhibitors: Sildenafil (Viagra), Tadalafil (Cialis), Vardenafil (Levitra), Avanafil (Stendra)    MAO Inhibitors: Rasagiline (Azilect), Selegiline (Eldepryl, Emsam, Selapar), Isocarboxazid (Marplan), Phenelzine (Nardil)

## 2022-10-26 NOTE — OR NURSING
0815: Report rec'd. Pt awake and alert. Denies pain or nausea.    0844: Pain is 1-2/10, no nausea. Meets criteria for stage ll.

## 2022-10-26 NOTE — H&P
Surgery Orthopedic History & Physical Note    Date  10/26/2022    Primary Care Physician  Pavithra Hadley M.D.    CC  Pre-Op Diagnosis Codes:     * Painful orthopaedic hardware (HCC) [T84.84XA]     * Ganglion of lower leg [M67.469]     * Chondromalacia of right knee [M94.261]     * Synovitis of right knee [M65.9]    HPI  This is a 41 y.o. female who presented with right knee pain and swelling over the tibial incision following a previous ACL reconstruction.  She also has some pain and swelling in the joint.    Past Medical History:   Diagnosis Date    Acute nasopharyngitis 04/05/2022    3 weeks ago       Past Surgical History:   Procedure Laterality Date    PB EXCIS TENDON SHEATH LESION, HAND/FINGER Right 4/11/2022    Procedure: EXCISION OF GANGLION CYST RIGHT WRIST;  Surgeon: Estelita Francois M.D.;  Location: SURGERY SAME DAY [facility];  Service: Orthopedics    OTHER ORTHOPEDIC SURGERY Right 03/2020    ACL    OTHER ORTHOPEDIC SURGERY Left 04/2017    ACL       Current Facility-Administered Medications   Medication Dose Route Frequency Provider Last Rate Last Admin    acetaminophen (TYLENOL) tablet 1,000 mg  1,000 mg Oral Once Alex Perez M.D.        lidocaine (XYLOCAINE) 1 % injection 0.5 mL  0.5 mL Intradermal Once PRN Ventura Dela Cruz M.D.        lactated ringers infusion   Intravenous Continuous Ventura Dela Cruz M.D.        clindamycin (Cleocin) IVPB premix 600 mg  600 mg Intravenous Once Alex Perez M.D.           Social History     Socioeconomic History    Marital status:      Spouse name: Not on file    Number of children: Not on file    Years of education: Not on file    Highest education level: Not on file   Occupational History    Not on file   Tobacco Use    Smoking status: Never    Smokeless tobacco: Never   Vaping Use    Vaping Use: Never used   Substance and Sexual Activity    Alcohol use: Yes     Alcohol/week: 1.8 oz     Types: 3 Cans of beer per week     Comment: 2  drinks/week    Drug use: Never    Sexual activity: Not on file   Other Topics Concern    Not on file   Social History Narrative    Not on file     Social Determinants of Health     Financial Resource Strain: Not on file   Food Insecurity: Not on file   Transportation Needs: Not on file   Physical Activity: Not on file   Stress: Not on file   Social Connections: Not on file   Intimate Partner Violence: Not on file   Housing Stability: Not on file       History reviewed. No pertinent family history.    Allergies  Ceclor [cefaclor] and Penicillins    Review of Systems  Negative    Physical Exam  Vitals and nursing note reviewed.   HENT:      Head: Normocephalic.   Cardiovascular:      Rate and Rhythm: Normal rate.   Pulmonary:      Effort: Pulmonary effort is normal.   Musculoskeletal:      Comments: Her right knee demonstrates some swelling over the tibial incision with no signs of redness or infection.  Her Lachman is stable.  Good range of motion.   Neurological:      Mental Status: She is alert.       Vital Signs  Blood Pressure: (!) 139/91   Temperature: 36 °C (96.8 °F)   Pulse: 83   Respiration: 18   Pulse Oximetry: 98 %       Assessment/Plan:  Pre-Op Diagnosis Codes:     * Painful orthopaedic hardware (HCC) [T84.84XA]     * Ganglion of lower leg [M67.469]     * Chondromalacia of right knee [M94.261]     * Synovitis of right knee [M65.9]  Procedure(s):  Knee arthroscopy with chondroplasty, limited synovectomy, open cyst excision with hardware removal and bone grafting, surgeries as indicated  CHONDROPLASTY  SYNOVECTOMY  EXCISION, CYST  REMOVAL, HARDWARE  BONE GRAFT  Right knee

## 2022-10-26 NOTE — OR NURSING
0846: Report received from Nacho TYLER. Pt to Stage 2 from PACU via [place]. Assisted to get dressed by CNA. VSS.     0900: DC instructions reviewed with pt and pt's .     0915: PIV DC'd by RN. Pt discharged via wheelchair by CNA. All belongings with pt.

## 2022-10-28 ENCOUNTER — DOCUMENTATION (OUTPATIENT)
Dept: PHARMACY | Facility: MEDICAL CENTER | Age: 42
End: 2022-10-28
Payer: COMMERCIAL

## 2022-10-29 LAB
BACTERIA WND AEROBE CULT: NORMAL
GRAM STN SPEC: NORMAL
SIGNIFICANT IND 70042: NORMAL
SITE SITE: NORMAL
SOURCE SOURCE: NORMAL

## 2022-10-29 NOTE — PROGRESS NOTES
**Unable to reach - Chart Review and Refill Activity evaluation**  ICD10 verified: G43.709    No Updates or Outside Providers     Review of refill assessment - any changes to dose, new medication, new allergy/health condition: N  I have evaluated data from the Refill Activity. Last appointment with specialty provider was 9/6/22.    This was UTRx2 for Aimovig 1st cycle, will try again in 12 weeks per workflow. Messaged liaison to re-introduce clinical services.    12-Feb-2020 02:06

## 2022-10-31 LAB
BACTERIA SPEC ANAEROBE CULT: NORMAL
SIGNIFICANT IND 70042: NORMAL
SITE SITE: NORMAL
SOURCE SOURCE: NORMAL

## 2022-10-31 PROCEDURE — RXMED WILLOW AMBULATORY MEDICATION CHARGE: Performed by: NURSE PRACTITIONER

## 2022-11-02 DIAGNOSIS — G43.709 CHRONIC MIGRAINE WITHOUT AURA WITHOUT STATUS MIGRAINOSUS, NOT INTRACTABLE: ICD-10-CM

## 2022-11-03 ENCOUNTER — PHARMACY VISIT (OUTPATIENT)
Dept: PHARMACY | Facility: MEDICAL CENTER | Age: 42
End: 2022-11-03
Payer: COMMERCIAL

## 2022-11-07 DIAGNOSIS — G43.709 CHRONIC MIGRAINE WITHOUT AURA WITHOUT STATUS MIGRAINOSUS, NOT INTRACTABLE: ICD-10-CM

## 2022-11-21 ENCOUNTER — TELEPHONE (OUTPATIENT)
Dept: HEALTH INFORMATION MANAGEMENT | Facility: OTHER | Age: 42
End: 2022-11-21
Payer: COMMERCIAL

## 2022-11-21 LAB
FUNGUS SPEC CULT: NORMAL
FUNGUS SPEC FUNGUS STN: NORMAL
SIGNIFICANT IND 70042: NORMAL
SITE SITE: NORMAL
SOURCE SOURCE: NORMAL

## 2022-11-23 DIAGNOSIS — G43.709 CHRONIC MIGRAINE WITHOUT AURA WITHOUT STATUS MIGRAINOSUS, NOT INTRACTABLE: ICD-10-CM

## 2022-11-23 NOTE — TELEPHONE ENCOUNTER
Received request via: Pharmacy    Was the patient seen in the last year in this department? Yes    Does the patient have an active prescription (recently filled or refills available) for medication(s) requested? Yes.     Does the patient have [facility]iary Plus and need 100 day supply (blood pressure, diabetes and cholesterol meds only)? Patient does not have SCP

## 2022-11-30 ENCOUNTER — PHARMACY VISIT (OUTPATIENT)
Dept: PHARMACY | Facility: MEDICAL CENTER | Age: 42
End: 2022-11-30
Payer: COMMERCIAL

## 2022-11-30 PROCEDURE — RXMED WILLOW AMBULATORY MEDICATION CHARGE: Performed by: PSYCHIATRY & NEUROLOGY

## 2022-12-05 ENCOUNTER — PHARMACY VISIT (OUTPATIENT)
Dept: PHARMACY | Facility: MEDICAL CENTER | Age: 42
End: 2022-12-05
Payer: COMMERCIAL

## 2022-12-05 PROCEDURE — RXMED WILLOW AMBULATORY MEDICATION CHARGE: Performed by: NURSE PRACTITIONER

## 2022-12-09 ENCOUNTER — TELEPHONE (OUTPATIENT)
Dept: NEUROLOGY | Facility: MEDICAL CENTER | Age: 42
End: 2022-12-09
Payer: COMMERCIAL

## 2022-12-15 ENCOUNTER — APPOINTMENT (OUTPATIENT)
Dept: NEUROLOGY | Facility: MEDICAL CENTER | Age: 42
End: 2022-12-15
Attending: PSYCHIATRY & NEUROLOGY
Payer: COMMERCIAL

## 2022-12-15 DIAGNOSIS — G43.709 CHRONIC MIGRAINE WITHOUT AURA WITHOUT STATUS MIGRAINOSUS, NOT INTRACTABLE: ICD-10-CM

## 2022-12-16 NOTE — TELEPHONE ENCOUNTER
Received request via: Pharmacy    Was the patient seen in the last year in this department? Yes    Does the patient have an active prescription (recently filled or refills available) for medication(s) requested? Yes.     Does the patient have residential Plus and need 100 day supply (blood pressure, diabetes and cholesterol meds only)? No

## 2022-12-23 ENCOUNTER — OFFICE VISIT (OUTPATIENT)
Dept: NEUROLOGY | Facility: MEDICAL CENTER | Age: 42
End: 2022-12-23
Attending: PSYCHIATRY & NEUROLOGY
Payer: COMMERCIAL

## 2022-12-23 VITALS
SYSTOLIC BLOOD PRESSURE: 116 MMHG | WEIGHT: 167.55 LBS | OXYGEN SATURATION: 100 % | DIASTOLIC BLOOD PRESSURE: 66 MMHG | HEART RATE: 87 BPM | BODY MASS INDEX: 27.88 KG/M2 | TEMPERATURE: 97.6 F

## 2022-12-23 DIAGNOSIS — G43.709 CHRONIC MIGRAINE W/O AURA W/O STATUS MIGRAINOSUS, NOT INTRACTABLE: ICD-10-CM

## 2022-12-23 DIAGNOSIS — G43.709 CHRONIC MIGRAINE WITHOUT AURA WITHOUT STATUS MIGRAINOSUS, NOT INTRACTABLE: ICD-10-CM

## 2022-12-23 PROCEDURE — 99212 OFFICE O/P EST SF 10 MIN: CPT | Performed by: PSYCHIATRY & NEUROLOGY

## 2022-12-23 PROCEDURE — 99213 OFFICE O/P EST LOW 20 MIN: CPT | Performed by: PSYCHIATRY & NEUROLOGY

## 2022-12-23 NOTE — PROGRESS NOTES
"Neuro follow up:    Chronic Migraine(s) Headache(s)    Estelita Gates 41 y.o.right handed woman who works at [employer] as a P[employer].      She describes getting headache(s) in 2019 which were exercise related during heavy sparing and occurring after her exercise is completely over. The headache(s) tend to occur in both temples and the bridge of the nose and no where else- a \"squeezing,pressure,like a vice sensation\". Infrequently very mild nausea. Some days she will have mild sensitivity.     About 1.5 years ago- she went to [facility] and saw an NP and has gone through several treatments. She started with Ajovy worked and within 1 week it was \"amazing\" as she was having mild frontal headaches and having the same type o headaches as above \"vice type\".     Triggers- Post Exercise (riding a [place] Bike) or Intense Cardio Sport like Mixed Martial Arts  and alcohol (beer and wine)    Estelita was totally headache free for about 2 months which was \"incredible\" as a few months before starting Ajovy she was having nearly daily headaches for a few months.    Quilpa has been tried (60 mg  daily) without benefit > 1.5 years ago (x 3 months)      Emgality tried mid summer and had tried Elavil (upset stomach and made her tired). No benefit at all.    Zhanna has now tried Aomovig <> Erenumab (140 mg/ml) x 3rd dose now    Topamax- 1st preventative tried> upset stomach (she could not tolerate this within even 2 weeks or so).     She had a nerve block ([facility])- bilateral occipital nerve blocks > no benefit.     Tried Botox in early December 2021 and she had a droopy eye lid and eye brow > she ended in the E.R. about 1 week latera> \"chest pain,anxiety\" and she had an EKG and some testing but found nothing. She is hesitant to try Botox again (next dose in March 2022). She described that both eye lids per drooping and this lasted for months.    Last Botox injection was in September 2022 and she does " not feel that symptoms are improving .     On Qulipta- 60 mg a tablet (daily)> > taking daily and this seems to be helping. She is out of Qulipta.     Nurtec 75 mg QOD >> has been taking this every other day x 6 months.     Brain [facility]-VOS-WHO- unremarkable in the last 1 year.     She never had a any notable headache(s) in her life but nothing that sounds to me like migraine(s).    No history of syncope, seizure type events, evolving gait-balance changes in the last 3 to 6 months or so.    Reason for Consult:       Patient Active Problem List    Diagnosis Date Noted    Painful orthopaedic hardware (HCC) 09/08/2022    Ganglion of lower leg 09/08/2022    Chondromalacia of right knee 09/08/2022    Synovitis of right knee 09/08/2022    Ganglion cyst of wrist, right 03/08/2022    Headache, migraine 10/20/2021    Health examination of defined subpopulation 05/23/2012       Past medical history:   Past Medical History:   Diagnosis Date    Acute nasopharyngitis 04/05/2022    3 weeks ago       Past surgical history:   Past Surgical History:   Procedure Laterality Date    PB KNEE SCOPE,DIAGNOSTIC Right 10/26/2022    Procedure: Knee arthroscopy with chondroplasty, limited synovectomy, open cyst excision with hardware removal and bone grafting, surgeries as indicated;  Surgeon: Ventura Dela Cruz M.D.;  Location: [facility];  Service: Orthopedics    PB EXCIS TENDON SHEATH LESION, HAND/FINGER Right 10/26/2022    Procedure: EXCISION, CYST;  Surgeon: Ventura Dela Cruz M.D.;  Location: [facility];  Service: Orthopedics    CHONDROPLASTY Right 10/26/2022    Procedure: CHONDROPLASTY;  Surgeon: Ventura Dela Cruz M.D.;  Location: [facility];  Service: Orthopedics    SYNOVECTOMY Right 10/26/2022    Procedure: SYNOVECTOMY;  Surgeon: Ventura Dela Cruz M.D.;  Location: [facility];  Service: Orthopedics    HARDWARE REMOVAL ORTHO Right 10/26/2022    Procedure: REMOVAL, HARDWARE;  Surgeon: Ventura Dela Cruz M.D.;   Location: SURGERY [facility];  Service: Orthopedics    BONE GRAFT Right 10/26/2022    Procedure: BONE GRAFT;  Surgeon: Ventura Dela Cruz M.D.;  Location: SURGERY [facility];  Service: Orthopedics    PB EXCIS TENDON SHEATH LESION, HAND/FINGER Right 4/11/2022    Procedure: EXCISION OF GANGLION CYST RIGHT WRIST;  Surgeon: Estelita Francois M.D.;  Location: SURGERY SAME DAY [facility];  Service: Orthopedics    OTHER ORTHOPEDIC SURGERY Right 03/2020    ACL    OTHER ORTHOPEDIC SURGERY Left 04/2017    ACL         Social history:   Social History     Socioeconomic History    Marital status:      Spouse name: Not on file    Number of children: Not on file    Years of education: Not on file    Highest education level: Not on file   Occupational History    Not on file   Tobacco Use    Smoking status: Never    Smokeless tobacco: Never   Vaping Use    Vaping Use: Never used   Substance and Sexual Activity    Alcohol use: Not Currently     Alcohol/week: 1.8 oz     Types: 3 Cans of beer per week     Comment: 2 drinks/week    Drug use: Never    Sexual activity: Not on file   Other Topics Concern    Not on file   Social History Narrative    Not on file     Social Determinants of Health     Financial Resource Strain: Not on file   Food Insecurity: Not on file   Transportation Needs: Not on file   Physical Activity: Not on file   Stress: Not on file   Social Connections: Not on file   Intimate Partner Violence: Not on file   Housing Stability: Not on file       Family history:   No family history on file.      Current medications:   Current Outpatient Medications   Medication    Erenumab-aooe 140 MG/ML Solution Auto-injector    meloxicam (MOBIC) 15 MG tablet    Eluxadoline (VIBERZI) 100 MG Tab    Rimegepant Sulfate 75 MG TABLET DISPERSIBLE    magnesium oxide (MAG-OX) 400 MG Tab tablet    Riboflavin 400 MG Cap    Ubiquinol 300 MG Cap    B Complex-C (SUPER B COMPLEX PO)    LO LOESTRIN FE 1 MG-10 MCG / 10 MCG Tab     Cetirizine HCl (ZYRTEC ALLERGY PO)    ondansetron (ZOFRAN ODT) 4 MG TABLET DISPERSIBLE    OnabotulinumtoxinA (BOTOX INJ)     No current facility-administered medications for this visit.       Medication Allergy:  Allergies   Allergen Reactions    Ceclor [Cefaclor] Hives and Swelling    Penicillins Hives and Swelling           Physical examination:   Vitals:    12/23/22 1532   BP: 110/88   BP Location: Left arm   Patient Position: Sitting   BP Cuff Size: Adult   Pulse: 87   Temp: 36.4 °C (97.6 °F)   TempSrc: Temporal   SpO2: 100%   Weight: 76 kg (167 lb 8.8 oz)       Normal Cephalic Atraumatic.  General: Full Range of Movement around the Neck in all directions without restrictions or discrete pain evoked triggers.  No lower extremity edema.      Neurological  Exam:    Mental status: Awake, alert and fully oriented to person, place, time, and situation. Normal attention, concentration, and fund of knowledge for education level.  Did not appear/act combative,irritable,anxious,paranoid/delusional or aggressive to or with me.  Speech and language: Speech is fluent without errors, clear, intact to repetition, and intact to naming.     Follows 3 step motor commands in sequence without significant delay and correctly.    Cranial nerve exam:  II: Pupils are equally round and reactive to light. Visual fields are intact by confrontation.  III, IV, VI: EOMI, no diplopia, no ptosis.  V: Sensation to light touch is normal over V1-3 distributions bilaterally.  .  VII: Facial movements are symmetrical. There is no facial droop. .  VIII: Hearing intact to soft speech and finger rub bilaterally  IX: Palate elevates symmetrically, uvula is midline. Dysarthria is not present.  XI: Shoulder shrug are symmetrical and strong.   XII: Tongue protrudes midline.        Motor exam:  Muscle tone is normal in all 4 limbs. and No abnormal movements appreciated.    Muscle strength:    Neck Flexors/Extensors:  5/5       Right  Left  Deltoid   5/5  5/5      Biceps   5/5  5/5  Triceps              5/5  5/5   Wrist extensors 5/5  5/5  Wrist flexors  5/5  5/5     5/5  5/5  Interossei  5/5  5/5  Thenar (APB)  5/5  5/5   Hip flexors  5/5  5/5  Quadriceps  5/5  5/5    Hamstrings  5/5  5/5  Dorsiflexors  5/5  5/5  Plantarflexors  5/5  5/5  Toe extension  5/5  5/5  Toe Flexors                5/5                   5/5      Reflexes:       Right  Left  Biceps   2/4  2/4  Triceps              2/4 2/4  Brachioradialis             2/4 2/4  Knee jerk  2/4 2/4  Ankle jerk  2/4 2/4     Frontal release signs are normal.    bilaterally toes are downgoing to plantar stimulation..    Coordination (finger-to-nose, heel/knee/shin, rapid alternating movements) was normal.     There was no truncal ataxia, no tremors, and no dysmetria.     Station and gait - easily stands up from exam chair without retropulsion,veering,leaning,swaying (to either side).   Arm swing symmetrical.    No rombergism.      Labs and Tests:     NEUROIMAGING:         Impression/Plans/Recommendations:    Migraine (chronic)- for 2-3 years.     Clearly had improvement with Ajovy with excellent results for 2 full months.     She has tried 5-6 different preventative for migraine(s).     No clear triggers (food or environmental).     More recently her migraine events have bene less severe (she had not taken Quilpta for 1 week when she ran out of samples)> her headaches seemed to still see lucina off it.     Headache(s)- 3 days per week frequency since early January 2022.  Headache(s) are 50% better in the recent months.     No change in characteristics of her headache(s) in the recent months.     In the recent months she is not having exercise related headaches (during her cardio) in the recent months.     Plans:     A. Nurtec 75 mg PO QOD for prevention- goals of Rx reviewed with Estelita today and expectations for Rx.     B. Has tried 4 CGRP Inhibitors to date and has  "been on Aoimig (140 mg) per month - 3rd injection so far.     C. 400 mg of magnesium oxide for prevention strategy.    D. [facility] Headache Referral for opinion on additional options at this point> Estelita is willing to drive to [place] for this opinion.    E. I suggested she read \"The End of Migraine Book\".        I have performed  a history and physical exam and a directed /focused  ROS today.     Total time spent today or this patient's care was 18 minutes  and included reviewing diagnostic workup to date and   medications as well as giving advise and suggestions on the present neurological problem and  documenting the clinical information in the EMR.     Follow up by email this point.     Gurvinder Anguiano MD  [employer] [employer].   [facility]        "

## 2022-12-28 ENCOUNTER — DOCUMENTATION (OUTPATIENT)
Dept: NEUROLOGY | Facility: MEDICAL CENTER | Age: 42
End: 2022-12-28
Payer: COMMERCIAL

## 2022-12-28 DIAGNOSIS — G43.709 CHRONIC MIGRAINE W/O AURA W/O STATUS MIGRAINOSUS, NOT INTRACTABLE: ICD-10-CM

## 2022-12-28 RX ORDER — DIHYDROERGOTAMINE MESYLATE 4 MG/ML
SPRAY NASAL
Qty: 1 ML | Refills: 6 | Status: SHIPPED | OUTPATIENT
Start: 2022-12-28 | End: 2022-12-28

## 2022-12-28 RX ORDER — DIHYDROERGOTAMINE MESYLATE 4 MG/ML
SPRAY NASAL
Qty: 1 ML | Refills: 6 | Status: SHIPPED | OUTPATIENT
Start: 2022-12-28 | End: 2023-03-06

## 2022-12-28 NOTE — PROGRESS NOTES
To change to Quilipta 10 mg a day orally and increase by 10 mg every 2 months as needed up to 60 mg a day.  Trial of migranal nasal spray.  No more Aiomovig for now (no benefit so far after 3rd monthly injection)

## 2023-01-04 ENCOUNTER — TELEPHONE (OUTPATIENT)
Dept: NEUROLOGY | Facility: MEDICAL CENTER | Age: 43
End: 2023-01-04
Payer: COMMERCIAL

## 2023-01-04 NOTE — TELEPHONE ENCOUNTER
Patient called stating that her pharmacy is requesting a PA for her Rimegepant Sulfate 75 MG TABLET DISPERSIBLE.

## 2023-01-06 ENCOUNTER — DOCUMENTATION (OUTPATIENT)
Dept: NEUROLOGY | Facility: MEDICAL CENTER | Age: 43
End: 2023-01-06
Payer: COMMERCIAL

## 2023-01-06 DIAGNOSIS — G43.709 CHRONIC MIGRAINE WITHOUT AURA WITHOUT STATUS MIGRAINOSUS, NOT INTRACTABLE: ICD-10-CM

## 2023-01-07 NOTE — PROGRESS NOTES
Zhanna has been through many,many acute and preventative medications    She has requested an opinion from an academic headache Neurologist.    I offered her opinions at [place],[facility] or [facility]      She preferred [place] or [place] ([place] being closer to [place] home).    So I just placed a referral this evening for an opinion from one of the [facility] Academic Headache Specialists.    Gurvinder Anguiano MD

## 2023-01-26 DIAGNOSIS — G43.709 CHRONIC MIGRAINE WITHOUT AURA WITHOUT STATUS MIGRAINOSUS, NOT INTRACTABLE: ICD-10-CM

## 2023-01-26 RX ORDER — ERENUMAB-AOOE 140 MG/ML
140 INJECTION, SOLUTION SUBCUTANEOUS
Qty: 1 ML | Refills: 0 | Status: CANCELLED | OUTPATIENT
Start: 2023-01-26 | End: 2024-01-26

## 2023-01-31 DIAGNOSIS — G43.709 CHRONIC MIGRAINE WITHOUT AURA WITHOUT STATUS MIGRAINOSUS, NOT INTRACTABLE: ICD-10-CM

## 2023-01-31 NOTE — TELEPHONE ENCOUNTER
Received request via: Pharmacy    Was the patient seen in the last year in this department? Yes    Does the patient have an active prescription (recently filled or refills available) for medication(s) requested? No    Does the patient have snf Plus and need 100 day supply (blood pressure, diabetes and cholesterol meds only)? Patient does not have SCP    Needs script sent to mail order.

## 2023-02-14 ENCOUNTER — DOCUMENTATION (OUTPATIENT)
Dept: NEUROLOGY | Facility: MEDICAL CENTER | Age: 43
End: 2023-02-14
Payer: COMMERCIAL

## 2023-02-14 ENCOUNTER — TELEPHONE (OUTPATIENT)
Dept: NEUROLOGY | Facility: MEDICAL CENTER | Age: 43
End: 2023-02-14
Payer: COMMERCIAL

## 2023-02-14 DIAGNOSIS — G43.709 CHRONIC MIGRAINE W/O AURA, NOT INTRACTABLE, W/O STAT MIGR: ICD-10-CM

## 2023-02-14 NOTE — PROGRESS NOTES
Patient wants to try Ajovy (monthly injections) for prevention of migraines.    She will stop taking Erenumab (140mg/ml)    She wants to also try Ubrelvy PRN acute migraine so I will prescribe this for her.

## 2023-02-17 DIAGNOSIS — G43.709 CHRONIC MIGRAINE W/O AURA, NOT INTRACTABLE, W/O STAT MIGR: ICD-10-CM

## 2023-02-17 NOTE — TELEPHONE ENCOUNTER
Received request via: Pharmacy    Was the patient seen in the last year in this department? Yes    Does the patient have an active prescription (recently filled or refills available) for medication(s) requested? No    Does the patient have assisted Plus and need 100 day supply (blood pressure, diabetes and cholesterol meds only)? Patient does not have SCP    Needs new script to mail order

## 2023-02-22 ENCOUNTER — DOCUMENTATION (OUTPATIENT)
Dept: NEUROLOGY | Facility: MEDICAL CENTER | Age: 43
End: 2023-02-22
Payer: COMMERCIAL

## 2023-02-22 DIAGNOSIS — G43.709 CHRONIC MIGRAINE WITHOUT AURA WITHOUT STATUS MIGRAINOSUS, NOT INTRACTABLE: ICD-10-CM

## 2023-02-22 RX ORDER — NARATRIPTAN 2.5 MG/1
TABLET ORAL
Qty: 16 TABLET | Refills: 6 | Status: SHIPPED | OUTPATIENT
Start: 2023-02-22 | End: 2023-03-06 | Stop reason: SDUPTHER

## 2023-03-02 NOTE — H&P (VIEW-ONLY)
HAND & UPPER EXTREMITY  FOLLOW UP VISIT    ID:  Julianne Gates is a pleasant 41 y.o. female here for follow up s/p excision of right volar radial wrist ganglion cyst on 4/11/2022.  Was most recently seen on 06/02/2022, presents today for routine recheck.    INTERVAL EVENTS:  She is here stating that she notices her cyst reoccurring.  No numbness or tingling    PHYSICAL EXAM:   General: No acute distress  Respiratory: Unlabored on room air  Right Upper Extremity:    Skin is clean and dry.  Incision is well-healed.  No evidence of recurrence.  She is able to make appointment all fingers without difficulty.  Wrist range of motion is symmetric to the contralateral side.  Radial artery is palpable throughout the course of the incision.  There is evidence of recurrent cyst about the volar radial  aspect of the right wrist, 1x1cm     IMAGING: No new imaging today.    ASSESSMENT/PLAN: Estelita Gates is a 41 y.o. female here for 10 month post op check after excision of right volar wrist ganglion cyst excision. She had a cyst reoccurrence about the right volar radial aspect few weeks ago.      We discussed the diagnosis, and pathophysiology at length. We reviewed their xrays together. We discussed treatment options including ongoing conservative measures (NSAIDs, splinting), aspiration with steroid injection, and surgical intervention (including excision). After much discussion they wish to proceed with surgical intervention.    The patient was told of the risks, benefits, and alternatives to the procedure. Risks included but not limited to bleeding, infection, injury to neurovascular and tendinous structures, recurrent cysts, and the need for subsequent surgeries. Advanced directives were discussed,team approach explained, the role of overlapping surgeries, information for medical photography. The patient consented and wished to proceed. All questions pertaining to the procedure and these risks were answered  and the patient agreed to proceed.      Surgery scheduler notified for right volar radial wrist ganglion cyst excision, proceed as indicated  Follow up post-op      Bright Luong, Zhao Ass't  I, Bright Luong, am scribing for and in the presence of Estelita Francois MD.    I, Estelita Francois MD, personally performed the services described in this documentation, as scribed by Bright Luong in my presence. I do hereby attest that this information is true, accurate, and complete to the best of my knowledge.

## 2023-03-06 ENCOUNTER — DOCUMENTATION (OUTPATIENT)
Dept: NEUROLOGY | Facility: MEDICAL CENTER | Age: 43
End: 2023-03-06
Payer: COMMERCIAL

## 2023-03-06 DIAGNOSIS — G43.709 CHRONIC MIGRAINE WITHOUT AURA WITHOUT STATUS MIGRAINOSUS, NOT INTRACTABLE: ICD-10-CM

## 2023-03-06 RX ORDER — NARATRIPTAN 1 MG/1
TABLET ORAL
Qty: 15 TABLET | Refills: 12 | Status: SHIPPED | OUTPATIENT
Start: 2023-03-06 | End: 2023-10-03

## 2023-03-06 RX ORDER — NARATRIPTAN 2.5 MG/1
TABLET ORAL
Qty: 6 TABLET | Refills: 6 | Status: SHIPPED | OUTPATIENT
Start: 2023-03-06 | End: 2023-06-06

## 2023-03-06 RX ORDER — NARATRIPTAN 2.5 MG/1
TABLET ORAL
Qty: 16 TABLET | Refills: 6 | Status: SHIPPED | OUTPATIENT
Start: 2023-03-06 | End: 2023-06-06

## 2023-03-07 ENCOUNTER — TELEPHONE (OUTPATIENT)
Dept: NEUROLOGY | Facility: MEDICAL CENTER | Age: 43
End: 2023-03-07

## 2023-03-10 ENCOUNTER — DOCUMENTATION (OUTPATIENT)
Dept: NEUROLOGY | Facility: MEDICAL CENTER | Age: 43
End: 2023-03-10
Payer: COMMERCIAL

## 2023-03-10 ENCOUNTER — TELEPHONE (OUTPATIENT)
Dept: NEUROLOGY | Facility: MEDICAL CENTER | Age: 43
End: 2023-03-10
Payer: COMMERCIAL

## 2023-03-10 DIAGNOSIS — G43.709 CHRONIC MIGRAINE W/O AURA W/O STATUS MIGRAINOSUS, NOT INTRACTABLE: ICD-10-CM

## 2023-03-10 RX ORDER — NORTRIPTYLINE HYDROCHLORIDE 10 MG/1
CAPSULE ORAL
Qty: 200 CAPSULE | Refills: 12 | Status: SHIPPED | OUTPATIENT
Start: 2023-03-10 | End: 2023-04-21 | Stop reason: SDUPTHER

## 2023-03-13 ENCOUNTER — PRE-ADMISSION TESTING (OUTPATIENT)
Dept: ADMISSIONS | Facility: MEDICAL CENTER | Age: 43
End: 2023-03-13
Attending: STUDENT IN AN ORGANIZED HEALTH CARE EDUCATION/TRAINING PROGRAM
Payer: COMMERCIAL

## 2023-03-13 RX ORDER — KETOROLAC TROMETHAMINE 10 MG/1
TABLET, FILM COATED ORAL PRN
COMMUNITY
Start: 2023-02-22 | End: 2023-10-23

## 2023-03-13 RX ORDER — MELOXICAM 7.5 MG/1
TABLET ORAL PRN
COMMUNITY
Start: 2023-02-09 | End: 2023-10-23

## 2023-03-23 ENCOUNTER — TELEPHONE (OUTPATIENT)
Dept: NEUROLOGY | Facility: MEDICAL CENTER | Age: 43
End: 2023-03-23
Payer: COMMERCIAL

## 2023-03-23 NOTE — TELEPHONE ENCOUNTER
VOICEMAIL  1. Caller Name: Estelita                        Call Back Number: [phone]    2. Message: Returning Abbey's call. Would like a call back.     3. Patient approves office to leave a detailed voicemail/MyChart message: N\A    Routed call to Abbey VENEGAS

## 2023-03-26 ENCOUNTER — ANESTHESIA EVENT (OUTPATIENT)
Dept: SURGERY | Facility: MEDICAL CENTER | Age: 43
End: 2023-03-26
Payer: COMMERCIAL

## 2023-03-27 ENCOUNTER — HOSPITAL ENCOUNTER (OUTPATIENT)
Facility: MEDICAL CENTER | Age: 43
End: 2023-03-27
Attending: STUDENT IN AN ORGANIZED HEALTH CARE EDUCATION/TRAINING PROGRAM | Admitting: STUDENT IN AN ORGANIZED HEALTH CARE EDUCATION/TRAINING PROGRAM
Payer: COMMERCIAL

## 2023-03-27 ENCOUNTER — ANESTHESIA (OUTPATIENT)
Dept: SURGERY | Facility: MEDICAL CENTER | Age: 43
End: 2023-03-27
Payer: COMMERCIAL

## 2023-03-27 VITALS
HEIGHT: 65 IN | DIASTOLIC BLOOD PRESSURE: 96 MMHG | OXYGEN SATURATION: 98 % | RESPIRATION RATE: 18 BRPM | SYSTOLIC BLOOD PRESSURE: 144 MMHG | TEMPERATURE: 97.5 F | HEART RATE: 80 BPM | WEIGHT: 166.01 LBS | BODY MASS INDEX: 27.66 KG/M2

## 2023-03-27 DIAGNOSIS — M67.431 GANGLION CYST OF WRIST, RIGHT: ICD-10-CM

## 2023-03-27 LAB
HCG UR QL: NEGATIVE
PATHOLOGY CONSULT NOTE: NORMAL

## 2023-03-27 PROCEDURE — 700102 HCHG RX REV CODE 250 W/ 637 OVERRIDE(OP): Performed by: STUDENT IN AN ORGANIZED HEALTH CARE EDUCATION/TRAINING PROGRAM

## 2023-03-27 PROCEDURE — 01810 ANES PX NRV MUSC F/ARM WRST: CPT | Performed by: STUDENT IN AN ORGANIZED HEALTH CARE EDUCATION/TRAINING PROGRAM

## 2023-03-27 PROCEDURE — 160039 HCHG SURGERY MINUTES - EA ADDL 1 MIN LEVEL 3: Performed by: STUDENT IN AN ORGANIZED HEALTH CARE EDUCATION/TRAINING PROGRAM

## 2023-03-27 PROCEDURE — 88304 TISSUE EXAM BY PATHOLOGIST: CPT

## 2023-03-27 PROCEDURE — 25112 REREMOVE WRIST TENDON LESION: CPT | Mod: RT | Performed by: STUDENT IN AN ORGANIZED HEALTH CARE EDUCATION/TRAINING PROGRAM

## 2023-03-27 PROCEDURE — 700111 HCHG RX REV CODE 636 W/ 250 OVERRIDE (IP): Performed by: STUDENT IN AN ORGANIZED HEALTH CARE EDUCATION/TRAINING PROGRAM

## 2023-03-27 PROCEDURE — 160048 HCHG OR STATISTICAL LEVEL 1-5: Performed by: STUDENT IN AN ORGANIZED HEALTH CARE EDUCATION/TRAINING PROGRAM

## 2023-03-27 PROCEDURE — 160009 HCHG ANES TIME/MIN: Performed by: STUDENT IN AN ORGANIZED HEALTH CARE EDUCATION/TRAINING PROGRAM

## 2023-03-27 PROCEDURE — 160002 HCHG RECOVERY MINUTES (STAT): Performed by: STUDENT IN AN ORGANIZED HEALTH CARE EDUCATION/TRAINING PROGRAM

## 2023-03-27 PROCEDURE — 700105 HCHG RX REV CODE 258: Performed by: STUDENT IN AN ORGANIZED HEALTH CARE EDUCATION/TRAINING PROGRAM

## 2023-03-27 PROCEDURE — 160028 HCHG SURGERY MINUTES - 1ST 30 MINS LEVEL 3: Performed by: STUDENT IN AN ORGANIZED HEALTH CARE EDUCATION/TRAINING PROGRAM

## 2023-03-27 PROCEDURE — 700101 HCHG RX REV CODE 250: Performed by: STUDENT IN AN ORGANIZED HEALTH CARE EDUCATION/TRAINING PROGRAM

## 2023-03-27 PROCEDURE — 160046 HCHG PACU - 1ST 60 MINS PHASE II: Performed by: STUDENT IN AN ORGANIZED HEALTH CARE EDUCATION/TRAINING PROGRAM

## 2023-03-27 PROCEDURE — 81025 URINE PREGNANCY TEST: CPT

## 2023-03-27 PROCEDURE — 160025 RECOVERY II MINUTES (STATS): Performed by: STUDENT IN AN ORGANIZED HEALTH CARE EDUCATION/TRAINING PROGRAM

## 2023-03-27 PROCEDURE — A9270 NON-COVERED ITEM OR SERVICE: HCPCS | Performed by: STUDENT IN AN ORGANIZED HEALTH CARE EDUCATION/TRAINING PROGRAM

## 2023-03-27 PROCEDURE — 160035 HCHG PACU - 1ST 60 MINS PHASE I: Performed by: STUDENT IN AN ORGANIZED HEALTH CARE EDUCATION/TRAINING PROGRAM

## 2023-03-27 RX ORDER — BUPIVACAINE HYDROCHLORIDE 2.5 MG/ML
INJECTION, SOLUTION EPIDURAL; INFILTRATION; INTRACAUDAL
Status: DISCONTINUED | OUTPATIENT
Start: 2023-03-27 | End: 2023-03-27 | Stop reason: HOSPADM

## 2023-03-27 RX ORDER — ONDANSETRON 2 MG/ML
4 INJECTION INTRAMUSCULAR; INTRAVENOUS
Status: DISCONTINUED | OUTPATIENT
Start: 2023-03-27 | End: 2023-03-27 | Stop reason: HOSPADM

## 2023-03-27 RX ORDER — HYDROMORPHONE HYDROCHLORIDE 1 MG/ML
0.1 INJECTION, SOLUTION INTRAMUSCULAR; INTRAVENOUS; SUBCUTANEOUS
Status: DISCONTINUED | OUTPATIENT
Start: 2023-03-27 | End: 2023-03-27 | Stop reason: HOSPADM

## 2023-03-27 RX ORDER — EPINEPHRINE 1 MG/ML(1)
AMPUL (ML) INJECTION
Status: DISCONTINUED
Start: 2023-03-27 | End: 2023-03-27 | Stop reason: HOSPADM

## 2023-03-27 RX ORDER — DIPHENHYDRAMINE HYDROCHLORIDE 50 MG/ML
12.5 INJECTION INTRAMUSCULAR; INTRAVENOUS
Status: DISCONTINUED | OUTPATIENT
Start: 2023-03-27 | End: 2023-03-27 | Stop reason: HOSPADM

## 2023-03-27 RX ORDER — SODIUM CHLORIDE, SODIUM LACTATE, POTASSIUM CHLORIDE, CALCIUM CHLORIDE 600; 310; 30; 20 MG/100ML; MG/100ML; MG/100ML; MG/100ML
INJECTION, SOLUTION INTRAVENOUS
Status: DISCONTINUED | OUTPATIENT
Start: 2023-03-27 | End: 2023-03-27 | Stop reason: SURG

## 2023-03-27 RX ORDER — BUPIVACAINE HYDROCHLORIDE 2.5 MG/ML
INJECTION, SOLUTION EPIDURAL; INFILTRATION; INTRACAUDAL
Status: DISCONTINUED
Start: 2023-03-27 | End: 2023-03-27 | Stop reason: HOSPADM

## 2023-03-27 RX ORDER — OXYCODONE HCL 5 MG/5 ML
10 SOLUTION, ORAL ORAL
Status: COMPLETED | OUTPATIENT
Start: 2023-03-27 | End: 2023-03-27

## 2023-03-27 RX ORDER — ONDANSETRON 2 MG/ML
INJECTION INTRAMUSCULAR; INTRAVENOUS PRN
Status: DISCONTINUED | OUTPATIENT
Start: 2023-03-27 | End: 2023-03-27 | Stop reason: SURG

## 2023-03-27 RX ORDER — OXYCODONE HCL 5 MG/5 ML
5 SOLUTION, ORAL ORAL
Status: COMPLETED | OUTPATIENT
Start: 2023-03-27 | End: 2023-03-27

## 2023-03-27 RX ORDER — CLINDAMYCIN PHOSPHATE 900 MG/50ML
900 INJECTION, SOLUTION INTRAVENOUS ONCE
Status: DISCONTINUED | OUTPATIENT
Start: 2023-03-27 | End: 2023-03-27 | Stop reason: HOSPADM

## 2023-03-27 RX ORDER — SODIUM CHLORIDE, SODIUM LACTATE, POTASSIUM CHLORIDE, CALCIUM CHLORIDE 600; 310; 30; 20 MG/100ML; MG/100ML; MG/100ML; MG/100ML
INJECTION, SOLUTION INTRAVENOUS CONTINUOUS
Status: DISCONTINUED | OUTPATIENT
Start: 2023-03-27 | End: 2023-03-27 | Stop reason: HOSPADM

## 2023-03-27 RX ORDER — HYDROCODONE BITARTRATE AND ACETAMINOPHEN 10; 325 MG/1; MG/1
.5-1 TABLET ORAL EVERY 6 HOURS PRN
Qty: 15 TABLET | Refills: 0 | Status: SHIPPED | OUTPATIENT
Start: 2023-03-27 | End: 2023-04-03

## 2023-03-27 RX ORDER — EPINEPHRINE 1 MG/ML(1)
AMPUL (ML) INJECTION
Status: DISCONTINUED | OUTPATIENT
Start: 2023-03-27 | End: 2023-03-27 | Stop reason: HOSPADM

## 2023-03-27 RX ORDER — HYDROMORPHONE HYDROCHLORIDE 1 MG/ML
0.2 INJECTION, SOLUTION INTRAMUSCULAR; INTRAVENOUS; SUBCUTANEOUS
Status: DISCONTINUED | OUTPATIENT
Start: 2023-03-27 | End: 2023-03-27 | Stop reason: HOSPADM

## 2023-03-27 RX ORDER — HALOPERIDOL 5 MG/ML
1 INJECTION INTRAMUSCULAR
Status: DISCONTINUED | OUTPATIENT
Start: 2023-03-27 | End: 2023-03-27 | Stop reason: HOSPADM

## 2023-03-27 RX ORDER — DEXAMETHASONE SODIUM PHOSPHATE 4 MG/ML
INJECTION, SOLUTION INTRA-ARTICULAR; INTRALESIONAL; INTRAMUSCULAR; INTRAVENOUS; SOFT TISSUE PRN
Status: DISCONTINUED | OUTPATIENT
Start: 2023-03-27 | End: 2023-03-27 | Stop reason: SURG

## 2023-03-27 RX ORDER — LIDOCAINE HYDROCHLORIDE 20 MG/ML
INJECTION, SOLUTION EPIDURAL; INFILTRATION; INTRACAUDAL; PERINEURAL PRN
Status: DISCONTINUED | OUTPATIENT
Start: 2023-03-27 | End: 2023-03-27 | Stop reason: SURG

## 2023-03-27 RX ORDER — MEPERIDINE HYDROCHLORIDE 25 MG/ML
12.5 INJECTION INTRAMUSCULAR; INTRAVENOUS; SUBCUTANEOUS
Status: DISCONTINUED | OUTPATIENT
Start: 2023-03-27 | End: 2023-03-27 | Stop reason: HOSPADM

## 2023-03-27 RX ORDER — HYDROMORPHONE HYDROCHLORIDE 1 MG/ML
0.4 INJECTION, SOLUTION INTRAMUSCULAR; INTRAVENOUS; SUBCUTANEOUS
Status: DISCONTINUED | OUTPATIENT
Start: 2023-03-27 | End: 2023-03-27 | Stop reason: HOSPADM

## 2023-03-27 RX ORDER — CEFAZOLIN SODIUM 1 G/3ML
INJECTION, POWDER, FOR SOLUTION INTRAMUSCULAR; INTRAVENOUS PRN
Status: DISCONTINUED | OUTPATIENT
Start: 2023-03-27 | End: 2023-03-27 | Stop reason: SURG

## 2023-03-27 RX ADMIN — CEFAZOLIN 2 G: 330 INJECTION, POWDER, FOR SOLUTION INTRAMUSCULAR; INTRAVENOUS at 13:36

## 2023-03-27 RX ADMIN — FENTANYL CITRATE 100 MCG: 50 INJECTION, SOLUTION INTRAMUSCULAR; INTRAVENOUS at 13:32

## 2023-03-27 RX ADMIN — DEXAMETHASONE SODIUM PHOSPHATE 4 MG: 4 INJECTION, SOLUTION INTRA-ARTICULAR; INTRALESIONAL; INTRAMUSCULAR; INTRAVENOUS; SOFT TISSUE at 13:36

## 2023-03-27 RX ADMIN — LIDOCAINE HYDROCHLORIDE 80 MG: 20 INJECTION, SOLUTION EPIDURAL; INFILTRATION; INTRACAUDAL at 13:32

## 2023-03-27 RX ADMIN — PROPOFOL 200 MG: 10 INJECTION, EMULSION INTRAVENOUS at 13:32

## 2023-03-27 RX ADMIN — OXYCODONE HYDROCHLORIDE 5 MG: 5 SOLUTION ORAL at 15:23

## 2023-03-27 RX ADMIN — SODIUM CHLORIDE, POTASSIUM CHLORIDE, SODIUM LACTATE AND CALCIUM CHLORIDE: 600; 310; 30; 20 INJECTION, SOLUTION INTRAVENOUS at 12:16

## 2023-03-27 RX ADMIN — SODIUM CHLORIDE, POTASSIUM CHLORIDE, SODIUM LACTATE AND CALCIUM CHLORIDE: 600; 310; 30; 20 INJECTION, SOLUTION INTRAVENOUS at 13:27

## 2023-03-27 RX ADMIN — ONDANSETRON 4 MG: 2 INJECTION INTRAMUSCULAR; INTRAVENOUS at 14:24

## 2023-03-27 NOTE — LETTER
March 7, 2023    Patient Name: Estelita Gates  Surgeon Name: Estelita Francois M.D.  Surgery Facility: [facility] ([address] [place] [address])  Surgery Date: 3/27/2023    The time of your surgery is not final and may change up to and until the day of your surgery. You will be contacted 24-48 hours prior to your surgery date with your check-in and surgery time.    If you will not be at one of the below numbers please call the surgery scheduler at [phone]  Preferred Phone: [phone]    BEFORE YOUR SURGERY   Pre Registration and/or Lab Work must be done within and no earlier than 28 days prior to your surgery date. Please call [facility] at [phone] for an appointment as soon as possible.    Instructions: Bring a list of all medications you are taking including the dosing and frequency.    DAY OF YOUR SURGERY  Nothing to eat or drink after midnight     Refrain from smoking any substance after midnight prior to surgery. Smoking may interfere with the anesthetic and frequently produces nausea during the recovery period.    Continue taking all lifesaving medications. Including the morning of your surgery with small sip of water.    Please do NOT take on the day of surgery:  Diuretics: examples- furosemide (Lasix), spironolactone, hydrochlorothiazide  ACE-inhibitors: examples- lisinopril, ramipril, enalapril  “ARBs”: examples- losartan, Olmesartan, valsartan    Please arrive at the hospital/surgery center at the check-in time provided.     An adult will need to bring you and take you home after your surgery.     AFTER YOUR SURGERY  Post op Appointment:   Date: 4/6   Time: 2PM   With: Estelita Francois M.D.   Location: [address] [place] [address]      TIME OFF WORK  FMLA or Disability forms can be faxed directly to: [phone] or you may drop them off at [address] [place] [address]. Our office charges a $35.00 fee per form. Forms will be  completed within 10 business days of drop off and payment received. For the status of your forms you may contact our disability office directly [phone].    MEDICATION INSTRUCTIONS **Please read section completely**    The following medications should be stopped a minimum of 10 days prior to surgery:  All over the counter, Supplements & Herbal medications    Anorectics: Phentermine (Adipex-P, Lomaira and Suprenza), Phentermine-topiramate (Qsymia), Bupropion-naltrexone (Contrave)    Opiod Partial Agonists/Opioid Antagonists: Buprenorphine (Subocone, Belbuca, Butrans, Probuphine Implant, Sublocade), Naltrexone (ReVia, Vivitrol), Naloxone    Amphetamines: Dextroamphetamine/Amphetamine (Adderall, Mydayis), Methylphenidate Hydrochloride (Concerta, Metadate, Methylin, Ritalin)    The following medications should be stopped 5 days prior to surgery:  Blood Thinners: Any Aspirin, Aspirin products, anti-inflammatories such as ibuprofen and any blood thinners such as Coumadin and Plavix. Please consult your prescribing physician if you are on life saving blood thinners, in regards to when to stop medications prior to surgery.     The following medications should be stopped a minimum of 3 days prior to surgery:  PDE-5 inhibitors: Sildenafil (Viagra), Tadalafil (Cialis), Vardenafil (Levitra), Avanafil (Stendra)    MAO Inhibitors: Rasagiline (Azilect), Selegiline (Eldepryl, Emsam, Selapar), Isocarboxazid (Marplan), Phenelzine (Nardil)         COVID and INFLUENZA NOTICE TO PATIENTS    Currently, the [facility] does not routinely test patients for COVID-19 or Influenza prior to their elective surgery.  However, if patients develop the following symptoms prior to their surgery date, they should voluntarily test for COVID-19 and Influenza and notify the surgical office of their condition and results.  The symptoms warranting testing would be any two of the following:    Fever (Temp above 100.4  F)  Chills  Cough  Shortness of breath or difficulty breathing  Fatigue  Myalgias (muscle or body aches)  Headache  Sore Throat  Congestion or Runny Nose  Nausea or vomiting  Diarrhea  New loss of taste or smell    Having these symptoms prior to surgery can significantly increase your risk of morbidity and mortality under anesthesia, which may compromise your health and require a transfer to a hospital for a higher level of care.  Therefore, it is advisable to notify the surgical team of any illness in order to get information for the appropriate time to delay the surgery to minimize these preventable risks.    Your health and safety are our number one priority at the [facility], and we are thankful that you entrust us with your care.  Please help us ensure the best possible surgical and anesthetic outcome by sharing appropriate health information with our perioperative team and staff.  We look forward to taking great care of you!    Thank you for your time and consideration on this matter.    Tereso Darden MD  Medical Director  Anesthesiologist  [facility]

## 2023-03-27 NOTE — ANESTHESIA PROCEDURE NOTES
Airway    Date/Time: 3/27/2023 1:34 PM  Performed by: Red Romero M.D.  Authorized by: Red Romero M.D.     Location:  OR  Urgency:  Elective  Indications for Airway Management:  Anesthesia      Spontaneous Ventilation: absent    Sedation Level:  Deep  Preoxygenated: Yes    Mask Difficulty Assessment:  1 - vent by mask  Final Airway Type:  Supraglottic airway  Final Supraglottic Airway:  Standard LMA    SGA Size:  3  Number of Attempts at Approach:  1

## 2023-03-27 NOTE — INTERVAL H&P NOTE
H&P updated. The patient was examined and    Respirations unlabored on room air, RRR, otherwise no changes since they were last seen.

## 2023-03-27 NOTE — DISCHARGE INSTRUCTIONS
If any questions arise, call your provider.  If your provider is not available, please feel free to call the Surgical Center at [phone].    MEDICATIONS: Resume taking daily medication.  Take prescribed pain medication with food.  If no medication is prescribed, you may take non-aspirin pain medication if needed.  PAIN MEDICATION CAN BE VERY CONSTIPATING.  Take a stool softener or laxative such as senokot, pericolace, or milk of magnesia if needed.    Last pain medication given at __________.     What to Expect Post Anesthesia    Rest and take it easy for the first 24 hours.  A responsible adult is recommended to remain with you during that time.  It is normal to feel sleepy.  We encourage you to not do anything that requires balance, judgment or coordination.    FOR 24 HOURS DO NOT:  Drive, operate machinery or run household appliances.  Drink beer or alcoholic beverages.  Make important decisions or sign legal documents.    To avoid nausea, slowly advance diet as tolerated, avoiding spicy or greasy foods for the first day.  Add more substantial food to your diet according to your provider's instructions.  Babies can be fed formula or breast milk as soon as they are hungry.  INCREASE FLUIDS AND FIBER TO AVOID CONSTIPATION.    MILD FLU-LIKE SYMPTOMS ARE NORMAL.  YOU MAY EXPERIENCE GENERALIZED MUSCLE ACHES, THROAT IRRITATION, HEADACHE AND/OR SOME NAUSEA.

## 2023-03-27 NOTE — OR NURSING
1434- Pt to PACU 9 from OR. Bedside report from anesthesiologist and RN.  Attached to monitoring, VSS, breathing is calm and unlabored, Patient is awake and calm. Pt has a dressing on right wrist and CDI. Remains on 4 L oxygen via mask.    1440- Pt Now on RA, and has had some water, tolerating well.    1500- Criteria met to transition patient to phase 2 recovery.     1523- Patient medicated per MAR.     1525- Discharge paperwork received and signed with .     1535- PIV removed with tip intact. Pt discharged home with all belongings.

## 2023-03-27 NOTE — DISCHARGE INSTR - OTHER INFO
DR. HOYT'S POST-OPERATIVE INSTRUCTIONS    You have just undergone a sugery by Dr. Hoyt in the operating room.  It is our wish that your postoperative recovery be as quick and comfortable as possible.  Please carefully review the following items that are important for your recovery.    After any operation, a certain degree of pain is to be expected. Take Advil (ibuprofen) and Extra Strength Tylenol as first line medications for mild to moderate pain. Taking each one every 6 hours, and staggering them so that you are taking one medicine every 3 hours, is the most effective. Refer to dosing instructions on the bottle, but in general ibuprofen dose is 600-800mg and Tylenol dose is 500-1000mg. For most small procedures, this should be enough to keep you comfortable.  You may have been given a small prescription for stronger pain medicine which will help relieve more severe pain.  Pain medicine may make you drowsy so please keep this in mind.  Do not drive while taking pain medicine.      When you go home, please keep your operated arm elevated at all times (above the level of your heart).  If you do this, your swelling will resolve more quickly and your pain will be improve more quickly as well. You may also place an ice pack over your dressing or splint to help with swelling and pain.    You have a splint on. This should remain on, clean and dry, until your follow up appointment. If you feel that your dressing is too tight during the first 3 days after surgery, you may loosen the outer layer. Make sure that your splint is kept dry at all times. You can take a shower if you cover your arm with a plastic bag.     If your fingers are exposed (not covered in a dressing or splint) please continue to move them to prevent stiffness.     Follow up after surgery is typically 10-14 days, unless you were specifically  instructed otherwise. If you have any questions about your appointment time or to confirm your appointment, please call our office at [phone].     At your first follow up appointment, the sutures will be removed and you may be asked to see a hand therapist to optimize your functional result. Each of the hand therapists that you will be referred to have received special training in the care of the hand and upper extremity.    If you have questions regarding your surgery postop that you feel requires attention, please call the office at [phone] during business hours, or [phone] after hours for the answering service. If you feel that you have a surgical emergency postoperatively that requires immediate attention, please call the above numbers or go to the Emergency Department.

## 2023-03-27 NOTE — OP REPORT
OPERATIVE NOTE  Date of procedure: 03/27/23    Pre-operative Diagnosis   1.  Recurrent right wrist volar ganglion            Post-operative diagnosis   1. Recurrent right wrist volar ganglion         Procedure   1.  Re-excision of right volar wrist ganglion cyst         Surgeon   1. Estelita Edge MD       Indication   Estelita Gates is a 42 y.o. female who presents with a painful, persistent right volar wrist ganglion that recurred 10 months after previous excision.   They have exhausted all non-operative measures.  The patient is indicated for the above stated procedure.     Anesthesia   General      Complications   None    Implants  None      Informed Consent   Patient was told of the risks, benefits, alternative to the procedure.  Risks included, but not limited to, infection, wound healing issues, injury to neurovascular and tendinous structures, recurrence, incomplete resolution of their symptoms, the need for subsequent surgeries.  Advanced directive were discussed, team approach explained, they understand the role of overlapping surgeries, the use of medical photography was reviewed.  The patient consented and wished to proceed.         Procedural Pause   The patient's correct identity, side, site, procedure to be performed was verified.  Intravenous antibiotics were not administered.       Procedural Note   The patient was brought to the operating room, positioned supine with their arm on an arm table, and were secured with safety straps.  A well-padded, nonsterile tourniquet was applied to the upper arm.  Our anesthesia colleagues then placed the patient under general anesthesia.  At which point the operative extremity was prepped and draped in standard sterile fashion. The upper extremity was exsanguinated with Esmarch and tourniquet was inflated to 250 mm of Hg.      Her previous scar (a 2-3 cm transverse incision, directly atop her ganglion cyst) was excised in an ellipse fashion. Skin incision  was made with a #15 blade.  Subcutaneous dissection down to the ganglion was performed with tenotomy scissors under loupe magnification, taking care to identify and protect overlying superficial nerves and veins, including the DRSN. The radial artery and venae comitans were identified proximally, and the cyst dissected carefully away from the vascular structures. It was encasing the dorsal radial artery branch, and was carefully dissected away. One small branch was ligated with a 3-0 vicryl suture. The ganglion was fairly complicated and multiloculated. One component went to to the 1st dorsal compartment tendons, and was carefully dissected free. The vessels were gently retracted, and the stalk of the ganglion cyst was then followed down to the volar wrist capsule.  The stalk was excised by making a small ligament sparing capsulectomy (parallel to the LRL and the RSC ligaments) with a #15 blade.  Any local synovitis was excised with a synovial rongeur taking care to protect any underlying wrist ligaments.  The specimen was incised, and gelatinous material expressed, consistent with a ganglion cyst.  The specimen was therefore discarded. At this point the tourniquet was deflated and hemostasis was obtained with bipolar cautery and pressure.  The wounds were all thoroughly irrigated with sterile saline.  All skin incisions were closed with interrupted 4-0 Monocryl deep dermal sutures and a 4-0 Monocryl running subcuticular stitch.    The final skin dressing consisted of Xeroform, 4 x 4 gauze, and a well-padded, volar resting splint..     The patient tolerated the procedure well and was awakened from general anesthesia without any known difficulties. They were transferred to the PACU in stable condition without any known complications.  All needle counts were correct.     Post-operative Plan   - The patient will remain an outpatient   - The patient was instructed to continue to move the exposed fingers and to keep the  hand elevated for edema control   - The patient will be seen in 10-14 days for wound check and suture removal

## 2023-03-27 NOTE — ANESTHESIA POSTPROCEDURE EVALUATION
Patient: Estelita Gates    Procedure Summary     Date: 03/27/23 Room / Location: [facility] ROOM 21 / SURGERY SAME DAY [facility]    Anesthesia Start: 1327 Anesthesia Stop: 1435    Procedure: RIGHT WRIST GANGLION CYST EXCISION (Right: Wrist) Diagnosis:       Ganglion cyst of volar aspect of right wrist      (Ganglion cyst of volar aspect of right wrist [M67.431])    Surgeons: Estelita Francois M.D. Responsible Provider: Red Romero M.D.    Anesthesia Type: general ASA Status: 2          Final Anesthesia Type: general  Last vitals  BP   Blood Pressure: (!) 151/93    Temp   36.5 °C (97.7 °F)    Pulse   75   Resp   18    SpO2   99 %      Anesthesia Post Evaluation    Patient location during evaluation: PACU  Patient participation: complete - patient participated  Level of consciousness: awake and alert    Airway patency: patent  Anesthetic complications: no  Cardiovascular status: hemodynamically stable  Respiratory status: acceptable  Hydration status: euvolemic    PONV: none          No notable events documented.     Nurse Pain Score: 0 (NPRS)

## 2023-03-27 NOTE — ANESTHESIA TIME REPORT
Anesthesia Start and Stop Event Times     Date Time Event    3/27/2023 1327 Ready for Procedure     1327 Anesthesia Start     1435 Anesthesia Stop        Responsible Staff  03/27/23    Name Role Begin End    Red Romero M.D. Anesth 1327 1435        Overtime Reason:  no overtime (within assigned shift)    Comments:

## 2023-03-27 NOTE — ANESTHESIA PREPROCEDURE EVALUATION
Case: [id number] Date/Time: 03/27/23 1300    Procedure: RIGHT WRIST GANGLION CYST EXCISION    Diagnosis: Ganglion cyst of volar aspect of right wrist [M67.431]    Pre-op diagnosis: Ganglion cyst of volar aspect of right wrist [M67.431]    Location: [facility] ROOM 21 / SURGERY SAME DAY [facility]    Surgeons: Estelita Francois M.D.        41 yo F w/ migraines and recurring R wrist ganglion cyst    LMA3  Relevant Problems   NEURO   (positive) Headache, migraine      CARDIAC   (positive) Headache, migraine      Other   (positive) Synovitis of right knee       Physical Exam    Airway   Mallampati: II  TM distance: >3 FB  Neck ROM: full       Cardiovascular - normal exam  Rhythm: regular  Rate: normal  (-) murmur     Dental - normal exam           Pulmonary - normal exam  Breath sounds clear to auscultation     Abdominal    Neurological - normal exam                 Anesthesia Plan    ASA 2       Plan - general       Airway plan will be LMA          Induction: intravenous    Postoperative Plan: Postoperative administration of opioids is intended.    Pertinent diagnostic labs and testing reviewed    Informed Consent:    Anesthetic plan and risks discussed with patient.    Use of blood products discussed with: patient whom consented to blood products.

## 2023-04-21 ENCOUNTER — DOCUMENTATION (OUTPATIENT)
Dept: NEUROLOGY | Facility: MEDICAL CENTER | Age: 43
End: 2023-04-21
Payer: COMMERCIAL

## 2023-04-21 DIAGNOSIS — G43.709 CHRONIC MIGRAINE W/O AURA W/O STATUS MIGRAINOSUS, NOT INTRACTABLE: ICD-10-CM

## 2023-04-21 RX ORDER — NORTRIPTYLINE HYDROCHLORIDE 10 MG/1
CAPSULE ORAL
Qty: 360 CAPSULE | Refills: 6 | Status: SHIPPED | OUTPATIENT
Start: 2023-04-21 | End: 2023-12-25

## 2023-05-03 NOTE — PROGRESS NOTES
Urogynecology and Pelvic Reconstructive Surgery Consultation Visit    Estelita Gates MRN:[id number] :1980    Referred by: Pavithra Hadley MD    Reason for Visit:   Chief Complaint   Patient presents with    New Patient     Consult          Subjective     History of Presenting Illness:    Estelita Gates is a 42 y.o. year old P2 who was referred by Dr. Piña (PCP) for the evaluation and management of rectocele.     She is most bothered by vaginal bulge and occasional fecal incontinence.  She is very active, and training for a , and is noted while training/running a bulge which is very irritating.  She has seen her GYN previously but has not undergone any treatments.      She is also seeing Dr. Jenna Martin (colorectal) for bleeding internal hemorrhoids, and she was referred to GI for treatment of these hemorrhoids, scheduled for banding on .  Her external hemorrhoids are minimally bothersome.    She strongly interested in definitive surgical management of her prolapse and potentially stool incontinence, however this will have to be deferred until after her back felt trial in the fall.    She is currently done with childbearing, on low Loestrin for birth control and bleeding management.    Prior Pelvic surgery:   None       Prior treatment:   None       Fluid intake:   6-8 cups wter  1-2 cups coffee    Pelvic floor symptom review:     Bladder:   Voids per day: 3-6 Voids per night: 0-1      Urinary incontinence episodes per day: none    Bladder emptying: Complete   Voiding symptoms: None   UTI in last 12 months: No   Other urologic history: None      Prolapse:     Prolapse symptoms: Bulge, Exteriorized Tissue, and Pelvic Pressure   Degree of prolapse: To Introitus     Bowel:    Constipation:IBS-D,  has outlet constipation. Avoids caffeine. She is sensitive with fiber. Uses imodie   Bowel movements per day: 2    Straining to empty bowels: No    Splinting to evacuate: Yes   Painful  evacuation: No    Difficulty emptying rectum: No    Incontinence to stool: yes, not too often with diet/caffeine management. Once every few months, staining   Blood in stool: Yes - hemorrhoids   Hemorrhoids: Yes   Bowel conditions: IBS      Sexual function:    Sexually active: Yes   Gender of partners: Male   Pain with intercourse: No        Past medical and surgical history    Past obstetric history   Number of vaginal deliveries: 2   Number of  deliveries: 0   History of vacuum/forceps: No    History of obstetric anal sphincter injury: Yes    Past gynecological history:    Last menstrual period: No LMP recorded. (Menstrual status: Irregular Menses).   History of abnormal uterine bleeding: No    History of fibroids: No    History of endometrial polyps:  No    History of endometriosis: No    History of cervical dysplasia: No    Last pap:    Current contraception: loloestrin      Past medical history:  Past Medical History:   Diagnosis Date    Pain 2023    right wrist    Acute nasopharyngitis 2022    3 weeks ago     Past surgical history:  Past Surgical History:   Procedure Laterality Date    GANGLION EXCISION Right 3/27/2023    Procedure: RIGHT WRIST GANGLION CYST EXCISION;  Surgeon: Estelita Francois M.D.;  Location: SURGERY SAME DAY [facility];  Service: Orthopedics    PB KNEE SCOPE,DIAGNOSTIC Right 10/26/2022    Procedure: Knee arthroscopy with chondroplasty, limited synovectomy, open cyst excision with hardware removal and bone grafting, surgeries as indicated;  Surgeon: Ventura Dela Cruz M.D.;  Location: [facility];  Service: Orthopedics    PB EXCIS TENDON SHEATH LESION, HAND/FINGER Right 10/26/2022    Procedure: EXCISION, CYST;  Surgeon: Ventura Dela Cruz M.D.;  Location: [facility];  Service: Orthopedics    CHONDROPLASTY Right 10/26/2022    Procedure: CHONDROPLASTY;  Surgeon: Ventura Dela Cruz M.D.;  Location: SURGERY [facility];  Service: Orthopedics     "SYNOVECTOMY Right 10/26/2022    Procedure: SYNOVECTOMY;  Surgeon: Ventura Dela Cruz M.D.;  Location: SURGERY [facility];  Service: Orthopedics    HARDWARE REMOVAL ORTHO Right 10/26/2022    Procedure: REMOVAL, HARDWARE;  Surgeon: Ventura Dela Cruz M.D.;  Location: SURGERY [facility];  Service: Orthopedics    BONE GRAFT Right 10/26/2022    Procedure: BONE GRAFT;  Surgeon: Ventura Dela Cruz M.D.;  Location: SURGERY [facility];  Service: Orthopedics    PB EXCIS TENDON SHEATH LESION, HAND/FINGER Right 4/11/2022    Procedure: EXCISION OF GANGLION CYST RIGHT WRIST;  Surgeon: Estelita Francois M.D.;  Location: SURGERY SAME DAY [facility];  Service: Orthopedics    OTHER ORTHOPEDIC SURGERY Right 03/2020    ACL    OTHER ORTHOPEDIC SURGERY Left 04/2017    ACL     Medications:has a current medication list which includes the following prescription(s): celecoxib, nortriptyline, meloxicam, ketorolac, meloxicam, celecoxib, naratriptan hcl, naratriptan, naratriptan, ubrogepant, fremanezumab-vfrm, erenumab-aooe, viberzi, onabotulinumtoxina, magnesium oxide, riboflavin, ubiquinol, b complex-c, lo loestrin fe, and cetirizine hcl.  Allergies:Ceclor [cefaclor] and Penicillins  Family history:History reviewed. No pertinent family history.  Social history: reports that she has never smoked. She has never been exposed to tobacco smoke. She has never used smokeless tobacco. She reports current alcohol use of about 1.8 oz per week. She reports that she does not use drugs.    Review of systems: A full review of systems was performed, and negative with the exception of want is noted above in the HPI.        Objective        BP (P) 124/86 (BP Location: Right arm, Patient Position: Sitting, BP Cuff Size: Adult)   Pulse (P) 69   Ht (P) 5' 5\"   Wt (P) 164 lb   BMI (P) 27.29 kg/m²     Physical Exam  Vitals reviewed. Exam conducted with a chaperone present (MA - see notes.).   Constitutional:       Appearance: Normal appearance.   HENT:     "  Head: Normocephalic.      Mouth/Throat:      Mouth: Mucous membranes are moist.   Cardiovascular:      Rate and Rhythm: Normal rate.   Pulmonary:      Effort: Pulmonary effort is normal.   Abdominal:      Palpations: Abdomen is soft. There is no mass.      Tenderness: There is no abdominal tenderness.   Skin:     General: Skin is warm and dry.   Neurological:      Mental Status: She is alert.   Psychiatric:         Mood and Affect: Mood normal.       Genitourinary:    External female genitalia: WNL   Vulva: WNL   Bulbocavernosus reflex: Intact   Anal wink reflex: Intact   Perineal sensation: WNL   Urethra: Hypermobile   Vagina: WNL   Atrophy: None   Cough stress test: Negative    Pelvic floor:    POP-Q: Aa 0 / Ba 0 / TVL 10 / C -4 / D -6 / Ap -1 / Bp -1 / GH 4 / PB 2   Palpable rectocele and perineocele   Prolapse stage: 2   Paravaginal defect: mild   Cervical elongation: No    Urethral tenderness: No    Bladder/ suprapubic tenderness: No    Levator tenderness: None   Levator muscle tone: Hypertonic   Pelvic floor contraction strength (modified Oxford scale): 3=Moderate   Pelvic floor contraction duration: Normal   Bimanual exam: Small, Mobile Uterus   Anal resting tone: WNL   Anal squeeze tone: WNL   Palpable anal sphincter defect: No    Granulation tissue: No    Epithelial erosions: No    Epithelial ulcerations: No    Fistula: None   Vaginal band/stricture: No     Procedure Performed: No    Diagnostic test and records review:       Post-void residual: 12 mL, performed by Bladder Scanner    Radiology: n/a    Documentation reviewed: Prior EMR Records           Assessment & Plan     Estelita Gates is a 42 y.o. year old P2 with symptomatic stage II uterovaginal prolapse, outlet dysfunction constipation, fecal staining. We discussed my recommendations for further diagnosis and treatment at length today.     1. Incomplete uterovaginal prolapse  2. Cystocele, midline  3. Rectocele  4. Outlet dysfunction  constipation  5. Perineocele  Ms. Gates has symptomatic pelvic organ prolapse, both anterior and posterior with an apical component. I reviewed the clinical findings and discussed the pathogenesis extensively, including genetic tendency, aging, menopause and childbirth injuries. Also discussed options for management, including both nonsurgical and surgical options.   Nonsurgical options include both expectant management and pessary use. I discussed different types of pessary as well as pessary care. The patient can be fitted with a pessary device in the office by a physician and the pessary can either be removed regularly by the patient or left in place, returning to the office every 3 to 6 months for evaluation.  She is interested in pessary therapy as a temporary management as she continues to train/compete, with goal for definitive surgery later this year.  Book for pessary fitting, likely ring with support if possible so she can self manage   I gave her information regarding pelvic for physical therapy which can help temporize her prolapse and help with bowel functioning.  She is given information to see orthopedic physical therapy as she would prefer not to wait the extensive wait for insurance covered therapy at [facility].  She is aware that there may be an out-of-pocket expense.  urgical options include vaginal and abdominal reconstructive approaches.  I reviewed that abdominal approaches would include both native tissue repair or a mesh-augmented sacralcolpopexy, which is usually performed through robotic-assisted laparoscopy or sometimes through a Pfannenstiel skin incision for more complex cases. Lightweight synthetic mesh augmentation via sacrocolpopexy is offered for abdominal approaches, which can reduce recurrence risk of prolapse but has a risk (1-3%) for mesh exposure. She is also a candidate for native tissue vaginal surgery which could include a vaginal hysterectomy with vault  suspension or uterine-sparing hysteropexy, anterior/posterior repair, and perineorrhaphy. I discussed the technical details including risks and benefits of each of these options with the patient at length.  We talked about uterine sparing and hysterectomy procedures at length.  She would prefer a uterine sparing procedure, if possible.  Given her exam and desires for functional outcomes, I would recommend a laparoscopic uterosacral hysteropexy, possible paravaginal repair, posterior pair/perineal reconstruction, cystourethroscopy.  She is aware that procedures that do not augmented with surgical mesh to have a higher recurrence/retreatment rate long-term, approximately 20%, although this may be mitigated that she presents with a lower degree of prolapse.  Since she is highly active she may be at higher risk of developing recurrence than others, although she does not perform any significant heavy lifting activities.  She is also aware that any procedures which reconstruct the perineum or the posterior aspect of the vagina (both which are necessary in her surgery) have an approximately 10% risk of new dyspareunia/pain with intercourse afterwards.  This often responds very well to pelvic floor physical therapy, but can persist.  Surgical management will be discussed further at her pessary fitting visit    6. Fecal staining  This is likely partially from her internal hemorrhoids, but also due to her significantly weakened/attenuated perineal body and perineal seal, leading to incomplete bowel emptying.  She has a significant chance of improvement with hemorrhoid treatment, as well as with perineal reconstruction.    7. Internal hemorrhoids  Planning for hemorrhoid banding in June.  I recommend at least 6 weeks recovery before any further pelvic surgery.             Pranav Mohan MD, FACOG  Urogynecology and Pelvic Reconstructive Surgery  Department of Obstetrics and Gynecology  [facility]  [facility]        This medical record contains text that has been entered with the assistance of computer voice recognition and dictation software.  Therefore, it may contain unintended errors in text, spelling, punctuation, or grammar

## 2023-05-04 ENCOUNTER — TELEPHONE (OUTPATIENT)
Dept: NEUROLOGY | Facility: MEDICAL CENTER | Age: 43
End: 2023-05-04

## 2023-05-04 ENCOUNTER — GYNECOLOGY VISIT (OUTPATIENT)
Dept: OBGYN | Facility: CLINIC | Age: 43
End: 2023-05-04
Payer: COMMERCIAL

## 2023-05-04 ENCOUNTER — DOCUMENTATION (OUTPATIENT)
Dept: NEUROLOGY | Facility: MEDICAL CENTER | Age: 43
End: 2023-05-04

## 2023-05-04 DIAGNOSIS — N81.2 INCOMPLETE UTEROVAGINAL PROLAPSE: ICD-10-CM

## 2023-05-04 DIAGNOSIS — K59.02 OUTLET DYSFUNCTION CONSTIPATION: ICD-10-CM

## 2023-05-04 DIAGNOSIS — N81.11 CYSTOCELE, MIDLINE: ICD-10-CM

## 2023-05-04 DIAGNOSIS — K64.8 INTERNAL HEMORRHOIDS: ICD-10-CM

## 2023-05-04 DIAGNOSIS — R15.1 FECAL SMEARING: ICD-10-CM

## 2023-05-04 DIAGNOSIS — N81.81 PERINEOCELE: ICD-10-CM

## 2023-05-04 DIAGNOSIS — N81.6 RECTOCELE: ICD-10-CM

## 2023-05-04 PROCEDURE — 99204 OFFICE O/P NEW MOD 45 MIN: CPT | Performed by: STUDENT IN AN ORGANIZED HEALTH CARE EDUCATION/TRAINING PROGRAM

## 2023-05-04 NOTE — PROGRESS NOTES
PT here today for consult   Ref for rectocele   Hysterectomy? X  Good #: [phone] (home)   PVR : 12 mL  Pharmacy Verified

## 2023-05-04 NOTE — TELEPHONE ENCOUNTER
Pt came in to  the samples for Ubrelvy 50mg and 100mg 8 boxes each per dr Anguiano.    Ubrelvy 50 mg gave 8 box LOT [id number] exp 4/24  Ubrelvy 100mg gve 8 box LOT [id number] exp.6/24

## 2023-05-04 NOTE — PROGRESS NOTES
For treatment of Migraines:    Pt came in to  the samples for Ubrelvy 50mg and 100mg 8 boxes each per dr Anguiano.     Ubrelvy 50 mg gave 8 box LOT [id number] exp 4/24  Ubrelvy 100mg gve 8 box LOT [id number] exp.6/24

## 2023-06-01 ENCOUNTER — APPOINTMENT (OUTPATIENT)
Dept: OBGYN | Facility: CLINIC | Age: 43
End: 2023-06-01
Payer: COMMERCIAL

## 2023-06-06 ENCOUNTER — DOCUMENTATION (OUTPATIENT)
Dept: NEUROLOGY | Facility: MEDICAL CENTER | Age: 43
End: 2023-06-06
Payer: COMMERCIAL

## 2023-06-06 DIAGNOSIS — G43.709 CHRONIC MIGRAINE WITHOUT AURA WITHOUT STATUS MIGRAINOSUS, NOT INTRACTABLE: ICD-10-CM

## 2023-06-06 DIAGNOSIS — G43.709 CHRONIC MIGRAINE W/O AURA, NOT INTRACTABLE, W/O STAT MIGR: ICD-10-CM

## 2023-06-06 RX ORDER — UBROGEPANT 50 MG/1
TABLET ORAL
Qty: 10 TABLET | Refills: 12 | Status: SHIPPED | OUTPATIENT
Start: 2023-06-06 | End: 2023-06-28 | Stop reason: SDUPTHER

## 2023-06-08 ENCOUNTER — TELEPHONE (OUTPATIENT)
Dept: NEUROLOGY | Facility: MEDICAL CENTER | Age: 43
End: 2023-06-08
Payer: COMMERCIAL

## 2023-06-08 NOTE — TELEPHONE ENCOUNTER
Renewal   Requesting prior authorization for Ubrelvy 50mg tab  PA Outcome approved   Quantity of 10 for a day supply of 30  Dates in effect, from 06/08/2023 through 06/08/2024

## 2023-06-08 NOTE — TELEPHONE ENCOUNTER
rec'd request via [employer]. ran t/c 10/30ds pa required  pa sent via [facility]. Key:[id number]

## 2023-06-09 ENCOUNTER — GYNECOLOGY VISIT (OUTPATIENT)
Dept: OBGYN | Facility: CLINIC | Age: 43
End: 2023-06-09
Payer: COMMERCIAL

## 2023-06-09 DIAGNOSIS — N81.6 RECTOCELE: ICD-10-CM

## 2023-06-09 DIAGNOSIS — N81.2 INCOMPLETE UTEROVAGINAL PROLAPSE: ICD-10-CM

## 2023-06-09 DIAGNOSIS — N81.11 CYSTOCELE, MIDLINE: ICD-10-CM

## 2023-06-09 PROCEDURE — A4561 PESSARY RUBBER, ANY TYPE: HCPCS | Performed by: STUDENT IN AN ORGANIZED HEALTH CARE EDUCATION/TRAINING PROGRAM

## 2023-06-09 PROCEDURE — 57160 INSERT PESSARY/OTHER DEVICE: CPT | Performed by: STUDENT IN AN ORGANIZED HEALTH CARE EDUCATION/TRAINING PROGRAM

## 2023-06-09 NOTE — PROCEDURES
Pessary Fitting    Date/Time: 6/9/2023 3:03 PM    Performed by: Pranav Mohan M.D.  Authorized by: Pranav Mohan M.D.    Consent:     Procedural risks discussed: yes      Relevant documents present and verified: yes      Patient agrees, verbalizes understanding, and wants to proceed: yes      Consent given by:  Patient  Indication:     Indication for pessary: cystocele, rectocele and vaginal vault prolapse    Procedure:     Pessaries tried:  #3 ring with support, #4 ring with support    Pessary type:  Ring w/ support  Outcomes:     How did patient test pessary?:  Valsalva, cough, squat, walk, void    Patient tolerance of procedure:  Tolerated well, no immediate complications    Post-procedure education provided: yes      She was taught how to insert and remove her pessary without issue and she feels comfortable self-management.  Counseled on removing at least once per month, but she can take it with sex or after activities.    Pranav Mohan MD

## 2023-06-21 DIAGNOSIS — G43.709 CHRONIC MIGRAINE W/O AURA, NOT INTRACTABLE, W/O STAT MIGR: ICD-10-CM

## 2023-06-21 NOTE — TELEPHONE ENCOUNTER
Received request via: Pharmacy    Was the patient seen in the last year in this department? Yes    Does the patient have an active prescription (recently filled or refills available) for medication(s) requested?  yes    Does the patient have [facility] and need 100 day supply (blood pressure, diabetes and cholesterol meds only)? Patient does not have SCP       Patient requesting 90 day  to mail order

## 2023-06-22 ENCOUNTER — TELEPHONE (OUTPATIENT)
Dept: NEUROLOGY | Facility: MEDICAL CENTER | Age: 43
End: 2023-06-22
Payer: COMMERCIAL

## 2023-06-23 ENCOUNTER — DOCUMENTATION (OUTPATIENT)
Dept: NEUROLOGY | Facility: MEDICAL CENTER | Age: 43
End: 2023-06-23
Payer: COMMERCIAL

## 2023-06-24 NOTE — PROGRESS NOTES
Zhanna has asked for samples of Ulbrelvy (50 mg and 100 mg tablets) for treatment of her migraines.    She can get 6 boxes of the 100 mg tablet size and 6 boxes of the 50 mg tablet size.    The directions for using the Ubrelvy medication are:    Take with or without food > 50 mg to 100 mg PRN migraine and may repeat a second dose of 50- 100 mg at least 2 hours after the initial dose.  Maximal recommended dose is 200 mg in a 24 hour period of time.

## 2023-06-28 DIAGNOSIS — G43.709 CHRONIC MIGRAINE WITHOUT AURA WITHOUT STATUS MIGRAINOSUS, NOT INTRACTABLE: ICD-10-CM

## 2023-06-28 RX ORDER — UBROGEPANT 50 MG/1
TABLET ORAL
Qty: 10 TABLET | Refills: 12 | Status: SHIPPED | OUTPATIENT
Start: 2023-06-28 | End: 2023-07-07 | Stop reason: SDUPTHER

## 2023-07-07 DIAGNOSIS — G43.709 CHRONIC MIGRAINE WITHOUT AURA WITHOUT STATUS MIGRAINOSUS, NOT INTRACTABLE: ICD-10-CM

## 2023-07-07 RX ORDER — UBROGEPANT 50 MG/1
TABLET ORAL
Qty: 10 TABLET | Refills: 12 | Status: SHIPPED | OUTPATIENT
Start: 2023-07-07 | End: 2023-07-13 | Stop reason: SDUPTHER

## 2023-07-11 NOTE — TELEPHONE ENCOUNTER
Pt requesting rx to [facility] mail order pharmacy, please resend script.   Urgent pt almost out of meds.    Dr Anguiano pt

## 2023-07-13 ENCOUNTER — TELEPHONE (OUTPATIENT)
Dept: NEUROLOGY | Facility: MEDICAL CENTER | Age: 43
End: 2023-07-13
Payer: COMMERCIAL

## 2023-07-13 DIAGNOSIS — G43.709 CHRONIC MIGRAINE WITHOUT AURA WITHOUT STATUS MIGRAINOSUS, NOT INTRACTABLE: ICD-10-CM

## 2023-07-13 RX ORDER — UBROGEPANT 50 MG/1
TABLET ORAL
Qty: 10 TABLET | Refills: 12 | OUTPATIENT
Start: 2023-07-13 | End: 2024-07-08

## 2023-07-13 NOTE — TELEPHONE ENCOUNTER
Estelita [facility]  Phone Number: [phone]     Can you please have a new prescription for the Ubrelvy 50 and the Ubrelvy 100 sent over to [facility]? They for some reason need a new one. Thank you  Sent via [employer]      It was sent to the wrong pharmacy, needs to go to [facility] please

## 2023-07-14 RX ORDER — UBROGEPANT 50 MG/1
TABLET ORAL
Qty: 10 TABLET | Refills: 12 | Status: SHIPPED | OUTPATIENT
Start: 2023-07-14 | End: 2024-07-10

## 2023-08-02 ENCOUNTER — TELEPHONE (OUTPATIENT)
Dept: NEUROLOGY | Facility: MEDICAL CENTER | Age: 43
End: 2023-08-02
Payer: COMMERCIAL

## 2023-08-24 ENCOUNTER — HOSPITAL ENCOUNTER (OUTPATIENT)
Facility: MEDICAL CENTER | Age: 43
End: 2023-08-24
Attending: STUDENT IN AN ORGANIZED HEALTH CARE EDUCATION/TRAINING PROGRAM | Admitting: STUDENT IN AN ORGANIZED HEALTH CARE EDUCATION/TRAINING PROGRAM
Payer: COMMERCIAL

## 2023-09-18 ENCOUNTER — DOCUMENTATION (OUTPATIENT)
Dept: NEUROLOGY | Facility: MEDICAL CENTER | Age: 43
End: 2023-09-18
Payer: COMMERCIAL

## 2023-09-18 ENCOUNTER — PATIENT MESSAGE (OUTPATIENT)
Dept: NEUROLOGY | Facility: MEDICAL CENTER | Age: 43
End: 2023-09-18
Payer: COMMERCIAL

## 2023-09-18 DIAGNOSIS — Z76.0 REFILL CLINIC MEDICATION MANAGEMENT PATIENT: ICD-10-CM

## 2023-09-21 NOTE — PATIENT COMMUNICATION
Patient mother picked up samples of Ubrelvy per Dr. Abraham note.  100mg LOT: [id number] EXP: 06/2024  50mg LOT: [id number] EXP:04/2024

## 2023-10-03 ENCOUNTER — DOCUMENTATION (OUTPATIENT)
Dept: NEUROLOGY | Facility: MEDICAL CENTER | Age: 43
End: 2023-10-03
Payer: COMMERCIAL

## 2023-10-03 ENCOUNTER — TELEPHONE (OUTPATIENT)
Dept: NEUROLOGY | Facility: MEDICAL CENTER | Age: 43
End: 2023-10-03
Payer: COMMERCIAL

## 2023-10-03 DIAGNOSIS — Z76.0 REFILL CLINIC MEDICATION MANAGEMENT PATIENT: ICD-10-CM

## 2023-10-03 NOTE — TELEPHONE ENCOUNTER
Received Refill PA request via [employer]  for Ubrelvy 50mg tab. (Quantity:10, Day Supply:30)     Insurance: BCBS  Member ID:  [id number]  BIN: [id number]  PCN: [id number]  Group: [id number]     Ran Test claim via [employer] [facility] medication  rts until

## 2023-10-18 ENCOUNTER — TELEPHONE (OUTPATIENT)
Dept: NEUROLOGY | Facility: MEDICAL CENTER | Age: 43
End: 2023-10-18
Payer: COMMERCIAL

## 2023-10-23 ENCOUNTER — OFFICE VISIT (OUTPATIENT)
Dept: NEUROLOGY | Facility: MEDICAL CENTER | Age: 43
End: 2023-10-23
Attending: PSYCHIATRY & NEUROLOGY
Payer: COMMERCIAL

## 2023-10-23 VITALS
SYSTOLIC BLOOD PRESSURE: 122 MMHG | TEMPERATURE: 98 F | WEIGHT: 156.53 LBS | HEART RATE: 74 BPM | OXYGEN SATURATION: 97 % | HEIGHT: 65 IN | BODY MASS INDEX: 26.08 KG/M2 | DIASTOLIC BLOOD PRESSURE: 74 MMHG

## 2023-10-23 DIAGNOSIS — G43.709 CHRONIC MIGRAINE W/O AURA, NOT INTRACTABLE, W/O STAT MIGR: Primary | ICD-10-CM

## 2023-10-23 PROCEDURE — 64615 CHEMODENERV MUSC MIGRAINE: CPT | Performed by: PSYCHIATRY & NEUROLOGY

## 2023-10-23 PROCEDURE — 700111 HCHG RX REV CODE 636 W/ 250 OVERRIDE (IP): Performed by: PSYCHIATRY & NEUROLOGY

## 2023-10-23 PROCEDURE — 700101 HCHG RX REV CODE 250: Performed by: PSYCHIATRY & NEUROLOGY

## 2023-10-23 RX ORDER — NIFEDIPINE 30 MG/1
TABLET, EXTENDED RELEASE ORAL
COMMUNITY
Start: 2023-09-26 | End: 2023-12-25

## 2023-10-23 RX ORDER — NARATRIPTAN 2.5 MG/1
TABLET ORAL
COMMUNITY
End: 2024-02-22

## 2023-10-23 RX ORDER — DOXYCYCLINE HYCLATE 100 MG
1 TABLET ORAL 2 TIMES DAILY
COMMUNITY
End: 2023-12-25

## 2023-10-23 RX ORDER — MONTELUKAST SODIUM 4 MG/1
TABLET, CHEWABLE ORAL
COMMUNITY
Start: 2023-10-09

## 2023-10-23 RX ADMIN — SODIUM CHLORIDE 155 UNITS: 9 INJECTION INTRAMUSCULAR; INTRAVENOUS; SUBCUTANEOUS at 08:47

## 2023-10-23 ASSESSMENT — PATIENT HEALTH QUESTIONNAIRE - PHQ9: CLINICAL INTERPRETATION OF PHQ2 SCORE: 0

## 2023-10-23 NOTE — PROGRESS NOTES
[facility]  BOTOX PROCEDURE NOTE    Chronic Migraine:  Botox therapy has reduced patient’s migraines by more than 7 days and/or 100 hours per month.     I treated Estelita Gates in clinic today with BotoxA 155 units according to the dosing/injection paradigm currently mandated by the FDA for the management of chronic migraine.  Specifically, I injected:  - 5 units to the procerus,  - 5 units to the corrugators (bilaterally),  - a total of 20 units to the frontalis musculature,  - 20 units to the temporalis (bilaterally),  - 15 units to the occipitalis (bilaterally),  - 10 units to the cervical paraspinals (bilaterally), and  - 15 units to the trapezius musculature (bilaterally).    The remainder of the Botox was discarded as wastage per FDA guidelines  Consent on file.  Patient identify verified with 2 patient identifiers.     Frequency of headaches is >15 days monthly with at least 8 migraines monthly.    Migraines include at least two of the following: worsened with activity or avoidance of activity with migraines (ie they go lie down), moderate to severe pain intensity, pulsing headache, unilateral headache and has  have either nausea or vomiting OR sensitivity to light and sound.     Although Estelita Gates is responding to botox s/he is NOT migraine free.  I recommend that Botox be continued at this time.    Estelita Gates has chronic migraines, defined as having 15 or more headaches days per month, 8 of which are migraines, over a minimum of the last three months.  Episodes last more than 4 hours (untreated).  Pt has 2 or more of following (see initial note):  - headache worsened with activity  - pain is moderate to severe intensity  - pulsing in nature  - unilateral   and patient either has nausea/vomiting OR sensitivity to light and sound.    No adverse effect of Botox noted at conclusion of today's appointment.    Follow up in 12 weeks for Botox or sooner if needed.    Signed: Willis  PAOLO Jones M.D.

## 2023-10-25 ENCOUNTER — DOCUMENTATION (OUTPATIENT)
Dept: NEUROLOGY | Facility: MEDICAL CENTER | Age: 43
End: 2023-10-25
Payer: COMMERCIAL

## 2023-10-25 ENCOUNTER — APPOINTMENT (OUTPATIENT)
Dept: NEUROLOGY | Facility: MEDICAL CENTER | Age: 43
End: 2023-10-25
Attending: PSYCHIATRY & NEUROLOGY
Payer: COMMERCIAL

## 2023-10-25 DIAGNOSIS — G43.709 CHRONIC MIGRAINE W/O AURA W/O STATUS MIGRAINOSUS, NOT INTRACTABLE: ICD-10-CM

## 2023-10-25 RX ORDER — NORTRIPTYLINE HYDROCHLORIDE 50 MG/1
CAPSULE ORAL
Qty: 60 CAPSULE | Refills: 3 | Status: SHIPPED | OUTPATIENT
Start: 2023-10-25 | End: 2023-12-25

## 2023-10-25 RX ORDER — NORTRIPTYLINE HYDROCHLORIDE 75 MG/1
CAPSULE ORAL
Qty: 90 CAPSULE | Refills: 6 | Status: SHIPPED | OUTPATIENT
Start: 2023-10-25 | End: 2024-09-30

## 2023-12-06 ENCOUNTER — APPOINTMENT (RX ONLY)
Dept: URBAN - METROPOLITAN AREA CLINIC 6 | Facility: CLINIC | Age: 43
Setting detail: DERMATOLOGY
End: 2023-12-06

## 2023-12-06 DIAGNOSIS — Z71.89 OTHER SPECIFIED COUNSELING: ICD-10-CM

## 2023-12-06 DIAGNOSIS — L70.8 OTHER ACNE: ICD-10-CM | Status: INADEQUATELY CONTROLLED

## 2023-12-06 DIAGNOSIS — L90.5 SCAR CONDITIONS AND FIBROSIS OF SKIN: ICD-10-CM

## 2023-12-06 DIAGNOSIS — D22 MELANOCYTIC NEVI: ICD-10-CM

## 2023-12-06 DIAGNOSIS — L81.4 OTHER MELANIN HYPERPIGMENTATION: ICD-10-CM

## 2023-12-06 DIAGNOSIS — L98.8 OTHER SPECIFIED DISORDERS OF THE SKIN AND SUBCUTANEOUS TISSUE: ICD-10-CM

## 2023-12-06 DIAGNOSIS — D18.0 HEMANGIOMA: ICD-10-CM

## 2023-12-06 DIAGNOSIS — L82.1 OTHER SEBORRHEIC KERATOSIS: ICD-10-CM

## 2023-12-06 PROBLEM — D22.5 MELANOCYTIC NEVI OF TRUNK: Status: ACTIVE | Noted: 2023-12-06

## 2023-12-06 PROBLEM — D18.01 HEMANGIOMA OF SKIN AND SUBCUTANEOUS TISSUE: Status: ACTIVE | Noted: 2023-12-06

## 2023-12-06 PROCEDURE — ? DIAGNOSIS COMMENT

## 2023-12-06 PROCEDURE — ? PRESCRIPTION MEDICATION MANAGEMENT

## 2023-12-06 PROCEDURE — ? PRESCRIPTION

## 2023-12-06 PROCEDURE — 99214 OFFICE O/P EST MOD 30 MIN: CPT

## 2023-12-06 PROCEDURE — ? COUNSELING

## 2023-12-06 RX ORDER — TRETIONIN 0.25 MG/G
1 CREAM TOPICAL QHS
Qty: 45 | Refills: 5 | Status: CANCELLED
Stop reason: SDUPTHER

## 2023-12-06 RX ORDER — SPIRONOLACTONE 50 MG/1
1 TABLET, FILM COATED ORAL BID
Qty: 60 | Refills: 2 | Status: ERX | COMMUNITY
Start: 2023-12-06

## 2023-12-06 RX ADMIN — SPIRONOLACTONE 1: 50 TABLET, FILM COATED ORAL at 00:00

## 2023-12-06 ASSESSMENT — LOCATION DETAILED DESCRIPTION DERM
LOCATION DETAILED: RIGHT FOREHEAD
LOCATION DETAILED: SUPERIOR MID FOREHEAD
LOCATION DETAILED: RIGHT INFERIOR LATERAL BUCCAL CHEEK
LOCATION DETAILED: LEFT FOREHEAD
LOCATION DETAILED: LEFT CENTRAL MALAR CHEEK
LOCATION DETAILED: RIGHT MEDIAL INFERIOR CHEST
LOCATION DETAILED: RIGHT SUPERIOR LATERAL MIDBACK
LOCATION DETAILED: LEFT INFERIOR UPPER BACK
LOCATION DETAILED: RIGHT DISTAL LATERAL POSTERIOR UPPER ARM

## 2023-12-06 ASSESSMENT — LOCATION ZONE DERM
LOCATION ZONE: ARM
LOCATION ZONE: TRUNK
LOCATION ZONE: FACE

## 2023-12-06 ASSESSMENT — LOCATION SIMPLE DESCRIPTION DERM
LOCATION SIMPLE: LEFT FOREHEAD
LOCATION SIMPLE: RIGHT FOREHEAD
LOCATION SIMPLE: RIGHT LOWER BACK
LOCATION SIMPLE: SUPERIOR FOREHEAD
LOCATION SIMPLE: LEFT CHEEK
LOCATION SIMPLE: CHEST
LOCATION SIMPLE: RIGHT CHEEK
LOCATION SIMPLE: LEFT UPPER BACK
LOCATION SIMPLE: RIGHT POSTERIOR UPPER ARM

## 2023-12-06 NOTE — PROCEDURE: PRESCRIPTION MEDICATION MANAGEMENT
"Subjective:        Patient ID: Deneen Jay is a 32 y.o. female.    Chief Complaint: Medication Refill    HPI   Deneen Jay presents for follow up of weight loss and Qsymia.  Pt is tolerating the 3.75mg dose well.  She is able to stop eating when she is full, doesn't "pick" at foods when she's not hungry.  She does note flattened taste of any carbonated drinks.  Other sweets taste the same.  When pt tried increasing to the 7.5mg dose (x 4 days) her anxiety and stress really increased.  She has some baseline anxiety and stress from work.  Pt continues with healthy diet and regular exercise, works out with a .    Review of Systems   Constitutional: Negative for activity change and unexpected weight change.   HENT: Negative for hearing loss, rhinorrhea and trouble swallowing.    Eyes: Negative for discharge and visual disturbance.   Respiratory: Negative for chest tightness and wheezing.    Cardiovascular: Negative for chest pain and palpitations.   Gastrointestinal: Negative for blood in stool, constipation, diarrhea and vomiting.   Endocrine: Negative for polydipsia and polyuria.   Genitourinary: Negative for difficulty urinating, dysuria, hematuria and menstrual problem.   Musculoskeletal: Negative for arthralgias, joint swelling and neck pain.   Neurological: Negative for weakness and headaches.   Psychiatric/Behavioral: Negative for confusion and dysphoric mood.           Objective:        Vitals:    01/08/18 1519   BP: 116/72   Temp: 98 °F (36.7 °C)     Physical Exam   Constitutional: She is oriented to person, place, and time. She appears well-developed and well-nourished. No distress.   HENT:   Head: Normocephalic and atraumatic.   Cardiovascular: Normal rate.    Pulmonary/Chest: Effort normal. No respiratory distress.   Neurological: She is alert and oriented to person, place, and time.   Skin: Skin is warm and dry.   Psychiatric: She has a normal mood and affect. Her behavior is normal. Judgment and "
thought content normal.   Vitals reviewed.          Assessment:         1. Overweight (BMI 25.0-29.9)              Plan:         Deneen was seen today for medication refill.    Diagnoses and all orders for this visit:    Overweight (BMI 25.0-29.9): 6# weight loss in 1.5 months since starting medication.  Will continue current dose of Qsymia 3.75/23mg qd.  Discussed starting with goal weight of ~140# then reassessing.  Continue with healthy diet and regular aerobic and weight bearing exercise.  -     phentermine-topiramate (QSYMIA) 3.75-23 mg CM24; Take 3.75 mg by mouth every morning.        Return in 2-3  months to follow up weight loss; sooner if any issues with medication.  
Render In Strict Bullet Format?: No
Detail Level: Zone
Plan: Can consider increasing spironolactone > resuming doxycycline (though at submicrobial dose) if not adequately controlled.\\nPt currently on Lo-Loestrin and likes this OCP. Can consider discussing changing with PCP/gyn to Ortho tricycline, estrostep, or Judy, which may also help with hormonal acne.
Initiate Treatment: Spironolactone 50mg: bid\\nCompounded Tretinoin 0.025% cream: every third night, then nightly as tolerated
Discontinue Regimen: Doxycycline
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
## 2023-12-06 NOTE — PROCEDURE: COUNSELING
Detail Level: Generalized
Detail Level: Zone
Detail Level: Detailed
Detail Level: Simple
Bactrim Pregnancy And Lactation Text: This medication is Pregnancy Category D and is known to cause fetal risk.  It is also excreted in breast milk.
Erythromycin Counseling:  I discussed with the patient the risks of erythromycin including but not limited to GI upset, allergic reaction, drug rash, diarrhea, increase in liver enzymes, and yeast infections.
Tazorac Pregnancy And Lactation Text: This medication is not safe during pregnancy. It is unknown if this medication is excreted in breast milk.
Sarecycline Pregnancy And Lactation Text: This medication is Pregnancy Category D and not consider safe during pregnancy. It is also excreted in breast milk.
Azelaic Acid Counseling: Patient counseled that medicine may cause skin irritation and to avoid applying near the eyes.  In the event of skin irritation, the patient was advised to reduce the amount of the drug applied or use it less frequently.   The patient verbalized understanding of the proper use and possible adverse effects of azelaic acid.  All of the patient's questions and concerns were addressed.
Isotretinoin Counseling: Patient should get monthly blood tests, not donate blood, not drive at night if vision affected, not share medication, and not undergo elective surgery for 6 months after tx completed. Side effects reviewed, pt to contact office should one occur.
Azithromycin Pregnancy And Lactation Text: This medication is considered safe during pregnancy and is also secreted in breast milk.
Aklief counseling:  Patient advised to apply a pea-sized amount only at bedtime and wait 30 minutes after washing their face before applying.  If too drying, patient may add a non-comedogenic moisturizer.  The most commonly reported side effects including irritation, redness, scaling, dryness, stinging, burning, itching, and increased risk of sunburn.  The patient verbalized understanding of the proper use and possible adverse effects of retinoids.  All of the patient's questions and concerns were addressed.
Topical Retinoid Pregnancy And Lactation Text: This medication is Pregnancy Category C. It is unknown if this medication is excreted in breast milk.
Benzoyl Peroxide Pregnancy And Lactation Text: This medication is Pregnancy Category C. It is unknown if benzoyl peroxide is excreted in breast milk.
Topical Sulfur Applications Counseling: Topical Sulfur Counseling: Patient counseled that this medication may cause skin irritation or allergic reactions.  In the event of skin irritation, the patient was advised to reduce the amount of the drug applied or use it less frequently.   The patient verbalized understanding of the proper use and possible adverse effects of topical sulfur application.  All of the patient's questions and concerns were addressed.
Tetracycline Counseling: Patient counseled regarding possible photosensitivity and increased risk for sunburn.  Patient instructed to avoid sunlight, if possible.  When exposed to sunlight, patients should wear protective clothing, sunglasses, and sunscreen.  The patient was instructed to call the office immediately if the following severe adverse effects occur:  hearing changes, easy bruising/bleeding, severe headache, or vision changes.  The patient verbalized understanding of the proper use and possible adverse effects of tetracycline.  All of the patient's questions and concerns were addressed. Patient understands to avoid pregnancy while on therapy due to potential birth defects.
Doxycycline Counseling:  Patient counseled regarding possible photosensitivity and increased risk for sunburn.  Patient instructed to avoid sunlight, if possible.  When exposed to sunlight, patients should wear protective clothing, sunglasses, and sunscreen.  The patient was instructed to call the office immediately if the following severe adverse effects occur:  hearing changes, easy bruising/bleeding, severe headache, or vision changes.  The patient verbalized understanding of the proper use and possible adverse effects of doxycycline.  All of the patient's questions and concerns were addressed.
High Dose Vitamin A Pregnancy And Lactation Text: High dose vitamin A therapy is contraindicated during pregnancy and breast feeding.
Include Pregnancy/Lactation Warning?: No
Winlevi Counseling:  I discussed with the patient the risks of topical clascoterone including but not limited to erythema, scaling, itching, and stinging. Patient voiced their understanding.
Dapsone Counseling: I discussed with the patient the risks of dapsone including but not limited to hemolytic anemia, agranulocytosis, rashes, methemoglobinemia, kidney failure, peripheral neuropathy, headaches, GI upset, and liver toxicity.  Patients who start dapsone require monitoring including baseline LFTs and weekly CBCs for the first month, then every month thereafter.  The patient verbalized understanding of the proper use and possible adverse effects of dapsone.  All of the patient's questions and concerns were addressed.
Birth Control Pills Counseling: Birth Control Pill Counseling: I discussed with the patient the potential side effects of OCPs including but not limited to increased risk of stroke, heart attack, thrombophlebitis, deep venous thrombosis, hepatic adenomas, breast changes, GI upset, headaches, and depression.  The patient verbalized understanding of the proper use and possible adverse effects of OCPs. All of the patient's questions and concerns were addressed.
Spironolactone Counseling: Patient advised regarding risks of diarrhea, abdominal pain, hyperkalemia, birth defects (for female patients), liver toxicity and renal toxicity. The patient may need blood work to monitor liver and kidney function and potassium levels while on therapy. The patient verbalized understanding of the proper use and possible adverse effects of spironolactone.  All of the patient's questions and concerns were addressed.
Isotretinoin Pregnancy And Lactation Text: This medication is Pregnancy Category X and is considered extremely dangerous during pregnancy. It is unknown if it is excreted in breast milk.
Tazorac Counseling:  Patient advised that medication is irritating and drying.  Patient may need to apply sparingly and wash off after an hour before eventually leaving it on overnight.  The patient verbalized understanding of the proper use and possible adverse effects of tazorac.  All of the patient's questions and concerns were addressed.
Sarecycline Counseling: Patient advised regarding possible photosensitivity and discoloration of the teeth, skin, lips, tongue and gums.  Patient instructed to avoid sunlight, if possible.  When exposed to sunlight, patients should wear protective clothing, sunglasses, and sunscreen.  The patient was instructed to call the office immediately if the following severe adverse effects occur:  hearing changes, easy bruising/bleeding, severe headache, or vision changes.  The patient verbalized understanding of the proper use and possible adverse effects of sarecycline.  All of the patient's questions and concerns were addressed.
Aklief Pregnancy And Lactation Text: It is unknown if this medication is safe to use during pregnancy.  It is unknown if this medication is excreted in breast milk.  Breastfeeding women should use the topical cream on the smallest area of the skin for the shortest time needed while breastfeeding.  Do not apply to nipple and areola.
Erythromycin Pregnancy And Lactation Text: This medication is Pregnancy Category B and is considered safe during pregnancy. It is also excreted in breast milk.
Azelaic Acid Pregnancy And Lactation Text: This medication is considered safe during pregnancy and breast feeding.
Topical Clindamycin Counseling: Patient counseled that this medication may cause skin irritation or allergic reactions.  In the event of skin irritation, the patient was advised to reduce the amount of the drug applied or use it less frequently.   The patient verbalized understanding of the proper use and possible adverse effects of clindamycin.  All of the patient's questions and concerns were addressed.
Doxycycline Pregnancy And Lactation Text: This medication is Pregnancy Category D and not consider safe during pregnancy. It is also excreted in breast milk but is considered safe for shorter treatment courses.
Bactrim Counseling:  I discussed with the patient the risks of sulfa antibiotics including but not limited to GI upset, allergic reaction, drug rash, diarrhea, dizziness, photosensitivity, and yeast infections.  Rarely, more serious reactions can occur including but not limited to aplastic anemia, agranulocytosis, methemoglobinemia, blood dyscrasias, liver or kidney failure, lung infiltrates or desquamative/blistering drug rashes.
Minocycline Counseling: Patient advised regarding possible photosensitivity and discoloration of the teeth, skin, lips, tongue and gums.  Patient instructed to avoid sunlight, if possible.  When exposed to sunlight, patients should wear protective clothing, sunglasses, and sunscreen.  The patient was instructed to call the office immediately if the following severe adverse effects occur:  hearing changes, easy bruising/bleeding, severe headache, or vision changes.  The patient verbalized understanding of the proper use and possible adverse effects of minocycline.  All of the patient's questions and concerns were addressed.
Winlevi Pregnancy And Lactation Text: This medication is considered safe during pregnancy and breastfeeding.
Spironolactone Pregnancy And Lactation Text: This medication can cause feminization of the male fetus and should be avoided during pregnancy. The active metabolite is also found in breast milk.
High Dose Vitamin A Counseling: Side effects reviewed, pt to contact office should one occur.
Topical Sulfur Applications Pregnancy And Lactation Text: This medication is Pregnancy Category C and has an unknown safety profile during pregnancy. It is unknown if this topical medication is excreted in breast milk.
Topical Retinoid counseling:  Patient advised to apply a pea-sized amount only at bedtime and wait 30 minutes after washing their face before applying.  If too drying, patient may add a non-comedogenic moisturizer. The patient verbalized understanding of the proper use and possible adverse effects of retinoids.  All of the patient's questions and concerns were addressed.
Dapsone Pregnancy And Lactation Text: This medication is Pregnancy Category C and is not considered safe during pregnancy or breast feeding.
Azithromycin Counseling:  I discussed with the patient the risks of azithromycin including but not limited to GI upset, allergic reaction, drug rash, diarrhea, and yeast infections.
Birth Control Pills Pregnancy And Lactation Text: This medication should be avoided if pregnant and for the first 30 days post-partum.
Topical Clindamycin Pregnancy And Lactation Text: This medication is Pregnancy Category B and is considered safe during pregnancy. It is unknown if it is excreted in breast milk.
Benzoyl Peroxide Counseling: Patient counseled that medicine may cause skin irritation and bleach clothing.  In the event of skin irritation, the patient was advised to reduce the amount of the drug applied or use it less frequently.   The patient verbalized understanding of the proper use and possible adverse effects of benzoyl peroxide.  All of the patient's questions and concerns were addressed.

## 2023-12-20 ENCOUNTER — OFFICE VISIT (OUTPATIENT)
Dept: URGENT CARE | Facility: PHYSICIAN GROUP | Age: 43
End: 2023-12-20
Payer: COMMERCIAL

## 2023-12-20 VITALS
OXYGEN SATURATION: 100 % | DIASTOLIC BLOOD PRESSURE: 72 MMHG | RESPIRATION RATE: 16 BRPM | TEMPERATURE: 97.2 F | BODY MASS INDEX: 26.66 KG/M2 | SYSTOLIC BLOOD PRESSURE: 110 MMHG | HEIGHT: 65 IN | HEART RATE: 79 BPM | WEIGHT: 160 LBS

## 2023-12-20 DIAGNOSIS — J02.9 SORE THROAT: ICD-10-CM

## 2023-12-20 LAB
FLUAV RNA SPEC QL NAA+PROBE: NEGATIVE
FLUBV RNA SPEC QL NAA+PROBE: NEGATIVE
RSV RNA SPEC QL NAA+PROBE: NEGATIVE
S PYO DNA SPEC NAA+PROBE: NOT DETECTED
SARS-COV-2 RNA RESP QL NAA+PROBE: NEGATIVE

## 2023-12-20 PROCEDURE — 99213 OFFICE O/P EST LOW 20 MIN: CPT | Performed by: NURSE PRACTITIONER

## 2023-12-20 PROCEDURE — 3078F DIAST BP <80 MM HG: CPT | Performed by: NURSE PRACTITIONER

## 2023-12-20 PROCEDURE — 3074F SYST BP LT 130 MM HG: CPT | Performed by: NURSE PRACTITIONER

## 2023-12-20 PROCEDURE — 0241U POCT CEPHEID COV-2, FLU A/B, RSV - PCR: CPT | Performed by: NURSE PRACTITIONER

## 2023-12-20 PROCEDURE — 87651 STREP A DNA AMP PROBE: CPT | Performed by: NURSE PRACTITIONER

## 2023-12-20 RX ORDER — SPIRONOLACTONE 100 MG/1
TABLET, FILM COATED ORAL
COMMUNITY
Start: 2023-12-15

## 2023-12-20 RX ORDER — SUMATRIPTAN 100 MG/1
TABLET, FILM COATED ORAL
COMMUNITY
Start: 2023-09-26

## 2023-12-20 RX ORDER — DEXAMETHASONE SODIUM PHOSPHATE 10 MG/ML
10 INJECTION INTRAMUSCULAR; INTRAVENOUS ONCE
Status: COMPLETED | OUTPATIENT
Start: 2023-12-20 | End: 2023-12-20

## 2023-12-20 RX ORDER — ELUXADOLINE 100 MG/1
TABLET, FILM COATED ORAL
COMMUNITY
Start: 2023-11-01

## 2023-12-20 RX ADMIN — DEXAMETHASONE SODIUM PHOSPHATE 10 MG: 10 INJECTION INTRAMUSCULAR; INTRAVENOUS at 12:45

## 2023-12-25 ASSESSMENT — ENCOUNTER SYMPTOMS
SHORTNESS OF BREATH: 0
EYES NEGATIVE: 1
SORE THROAT: 1
SWOLLEN GLANDS: 1
HOARSE VOICE: 1
HEADACHES: 1
TROUBLE SWALLOWING: 1
CARDIOVASCULAR NEGATIVE: 1
CHILLS: 1
DIARRHEA: 0
ABDOMINAL PAIN: 0
COUGH: 1
NECK PAIN: 1
GASTROINTESTINAL NEGATIVE: 1
FEVER: 1

## 2023-12-25 NOTE — PROGRESS NOTES
"Subjective:   Estelita Gates is a 43 y.o. female who presents for Sore Throat (Sore throat, headaches, body aches, chills, fever lethargic x3 days. Sore throat is the worst.)      Pharyngitis   This is a new problem. The current episode started in the past 7 days (3 days). The maximum temperature recorded prior to her arrival was 101 - 101.9 F. The fever has been present for 1 to 2 days. The pain is at a severity of 6/10. The pain is moderate. Associated symptoms include congestion, coughing, headaches, a hoarse voice, neck pain, swollen glands and trouble swallowing. Pertinent negatives include no abdominal pain, diarrhea, drooling, ear discharge or shortness of breath. She has tried NSAIDs, gargles and acetaminophen for the symptoms. The treatment provided mild relief.       Review of Systems   Constitutional:  Positive for chills, fever and malaise/fatigue.   HENT:  Positive for congestion, hoarse voice, sore throat and trouble swallowing. Negative for drooling and ear discharge.    Eyes: Negative.    Respiratory:  Positive for cough. Negative for shortness of breath.    Cardiovascular: Negative.    Gastrointestinal: Negative.  Negative for abdominal pain and diarrhea.   Genitourinary: Negative.    Musculoskeletal:  Positive for neck pain.   Neurological:  Positive for headaches.       Medications, Allergies, and current problem list reviewed today in Epic.     Objective:     /72 (BP Location: Right arm, Patient Position: Sitting, BP Cuff Size: Adult)   Pulse 79   Temp 36.2 °C (97.2 °F) (Temporal)   Resp 16   Ht 1.651 m (5' 5\")   Wt 72.6 kg (160 lb)   SpO2 100%     Physical Exam  Vitals reviewed.   Constitutional:       General: She is not in acute distress.     Appearance: Normal appearance. She is ill-appearing.   HENT:      Head: Normocephalic and atraumatic.      Right Ear: Tympanic membrane, ear canal and external ear normal.      Left Ear: Tympanic membrane, ear canal and external ear " normal.      Nose: Congestion present.      Mouth/Throat:      Mouth: Mucous membranes are moist.      Pharynx: Uvula midline. Pharyngeal swelling and posterior oropharyngeal erythema present. No oropharyngeal exudate or uvula swelling.   Eyes:      Extraocular Movements: Extraocular movements intact.      Conjunctiva/sclera: Conjunctivae normal.      Pupils: Pupils are equal, round, and reactive to light.   Cardiovascular:      Rate and Rhythm: Normal rate and regular rhythm.   Pulmonary:      Effort: Pulmonary effort is normal.      Breath sounds: Normal breath sounds.   Abdominal:      General: Abdomen is flat.      Palpations: Abdomen is soft.   Musculoskeletal:         General: Normal range of motion.      Cervical back: Normal range of motion and neck supple.   Skin:     General: Skin is warm and dry.      Capillary Refill: Capillary refill takes less than 2 seconds.   Neurological:      General: No focal deficit present.      Mental Status: She is alert.   Psychiatric:         Mood and Affect: Mood normal.         Behavior: Behavior normal.       Results for orders placed or performed in visit on 12/20/23   POCT CoV-2, Flu A/B, RSV by PCR   Result Value Ref Range    SARS-CoV-2 by PCR Negative Negative, Invalid    Influenza virus A RNA Negative Negative, Invalid    Influenza virus B, PCR Negative Negative, Invalid    RSV, PCR Negative Negative, Invalid   POCT GROUP A STREP, PCR   Result Value Ref Range    POC Group A Strep, PCR Not Detected Not Detected, Invalid         Assessment/Plan:     Diagnosis and associated orders:     1. Sore throat  dexamethasone (Decadron) injection (check route below) 10 mg    POCT CoV-2, Flu A/B, RSV by PCR    POCT GROUP A STREP, PCR         Comments/MDM:     Point of Care testing for GABHS is NEGATIVE  Per the Toast trial I will administer 10 mg of dexamethasone in clinic for nonstreptococcal pharyngitis.  I discussed the risks/benefits of this intervention and using shared  decision making we felt this was a reasonable course of action.  Pt cautioned of gastritis, agitation/insomnia, risk of hyperglycemia if diabetic.  Discussed supportive care including salt water gargles, benzocaine drops  Discussed return precautions including signs and symptoms of peritonsillar abscess, retropharyngeal abscess  Counseled the patient to follow up with primary care provider (or establish a primary care provider) for ongoing health maintenance            Differential diagnosis, natural history, supportive care, and indications for immediate follow-up discussed.    Advised the patient to follow-up with the primary care physician for recheck, reevaluation, and consideration of further management.    Please note that this dictation was created using voice recognition software. I have made a reasonable attempt to correct obvious errors, but I expect that there are errors of grammar and possibly content that I did not discover before finalizing the note.    This note was electronically signed by YAMINI Weston

## 2024-01-03 RX ORDER — SPIRONOLACTONE 50 MG/1
1 TABLET, FILM COATED ORAL BID
Qty: 60 | Refills: 2 | Status: ERX

## 2024-01-18 ENCOUNTER — APPOINTMENT (OUTPATIENT)
Dept: NEUROLOGY | Facility: MEDICAL CENTER | Age: 44
End: 2024-01-18
Attending: PSYCHIATRY & NEUROLOGY
Payer: COMMERCIAL

## 2024-01-23 ENCOUNTER — APPOINTMENT (RX ONLY)
Dept: URBAN - METROPOLITAN AREA CLINIC 15 | Facility: CLINIC | Age: 44
Setting detail: DERMATOLOGY
End: 2024-01-23

## 2024-01-23 DIAGNOSIS — L65.9 NONSCARRING HAIR LOSS, UNSPECIFIED: ICD-10-CM

## 2024-01-23 PROCEDURE — ? LATISSE COUNSELING

## 2024-01-23 PROCEDURE — ? PRESCRIPTION

## 2024-01-23 PROCEDURE — ? COUNSELING

## 2024-01-23 PROCEDURE — 99212 OFFICE O/P EST SF 10 MIN: CPT

## 2024-01-23 PROCEDURE — ? DIAGNOSIS COMMENT

## 2024-01-23 RX ADMIN — BIMATOPROST: 0.3 SOLUTION/ DROPS OPHTHALMIC at 00:00

## 2024-01-23 ASSESSMENT — LOCATION SIMPLE DESCRIPTION DERM: LOCATION SIMPLE: RIGHT SUPERIOR EYELID

## 2024-01-23 ASSESSMENT — LOCATION ZONE DERM: LOCATION ZONE: EYELID

## 2024-01-23 ASSESSMENT — LOCATION DETAILED DESCRIPTION DERM: LOCATION DETAILED: RIGHT LATERAL SUPERIOR EYELID

## 2024-01-23 NOTE — PROCEDURE: DIAGNOSIS COMMENT
Comment: Several missing eyelashes, but no lesion/growth visible today. Will recheck in 4 months. Will start Latisse today. Discussed potential SE including irritation and potential permanent discoloration of irises (she has light colored eyes). She understands and would like to trial Latisse.
Detail Level: Zone
Render Risk Assessment In Note?: no

## 2024-01-23 NOTE — PROCEDURE: LATISSE COUNSELING
Detail Level: Zone
Latisse Counseling: I reviewed the possible side-effects of Latisse including itching, eye irritation, discoloration, exacerbating glaucoma, and potential for permanent discoloration/darkening of the iris.  I also discussed application methods.

## 2024-01-24 ENCOUNTER — TELEPHONE (OUTPATIENT)
Dept: NEUROLOGY | Facility: MEDICAL CENTER | Age: 44
End: 2024-01-24
Payer: COMMERCIAL

## 2024-01-24 RX ORDER — BIMATOPROST 0.3 MG/ML
SOLUTION/ DROPS OPHTHALMIC
Qty: 3 | Refills: 2 | Status: ERX | COMMUNITY
Start: 2024-01-23

## 2024-01-25 NOTE — TELEPHONE ENCOUNTER
Patient next Botox not scheduled, Botox was due 1/15/24-1/23/24. Auth good 3/8/23-3/6/24. Will call to schedule.

## 2024-01-29 ENCOUNTER — TELEPHONE (OUTPATIENT)
Dept: NEUROLOGY | Facility: MEDICAL CENTER | Age: 44
End: 2024-01-29
Payer: COMMERCIAL

## 2024-01-29 ENCOUNTER — PATIENT MESSAGE (OUTPATIENT)
Dept: NEUROLOGY | Facility: MEDICAL CENTER | Age: 44
End: 2024-01-29
Payer: COMMERCIAL

## 2024-01-29 DIAGNOSIS — H02.403 PTOSIS OF EYELID, BILATERAL: Primary | ICD-10-CM

## 2024-02-07 ENCOUNTER — APPOINTMENT (OUTPATIENT)
Dept: NEUROLOGY | Facility: MEDICAL CENTER | Age: 44
End: 2024-02-07
Attending: PSYCHIATRY & NEUROLOGY
Payer: COMMERCIAL

## 2024-02-07 NOTE — PROGRESS NOTES
Urogynecology and Pelvic Reconstructive Surgery  Follow Up    Estelita Gates MRN:[id number]    Referred by: Pavithra Hadley MD    Reason for Visit:   Chief Complaint   Patient presents with    Other     Fv         Subjective     History of Presenting Illness:    Estelita Gates is a 43 y.o. year old P2 with prolapse who presents for follow-up    She is initially booked for a robotic hysteropexy with repairs, but deferred this due to obtaining her  and opted for pessary fitting in 2023, which she used during her training but is not a good long term solution.  She would like to discuss surgical options again today    She is having spotting, taking Loestrin continuously       Initial HPI: She was referred by Dr. Piña (PCP) for the evaluation and management of rectocele. She is most bothered by vaginal bulge and occasional fecal incontinence.  She is very active, and training for a , and is noted while training/running a bulge which is very irritating.  She has seen her GYN previously but has not undergone any treatments.   She is also seeing Dr. Jenna Martin (colorectal) for bleeding internal hemorrhoids, and she was referred to GI for treatment of these hemorrhoids, scheduled for banding on .  Her external hemorrhoids are minimally bothersome. She strongly interested in definitive surgical management of her prolapse and potentially stool incontinence, however this will have to be deferred until after her back felt trial in the fall. She is currently done with childbearing, on lo Loestrin for birth control and bleeding management.    Prior Pelvic surgery:   None       Prior treatment:   Pessary (#3 ring - too small, #4 ring)       Fluid intake:   6-8 cups wter  1-2 cups coffee    Pelvic floor symptom review:     Bladder:   Voids per day: 3-6 Voids per night: 0-1      Urinary incontinence episodes per day: none    Bladder emptying: Complete   Voiding symptoms:  None   UTI in last 12 months: No   Other urologic history: None      Prolapse:     Prolapse symptoms: Bulge, Exteriorized Tissue, and Pelvic Pressure   Degree of prolapse: To Introitus     Bowel:    Constipation:IBS-D,  has outlet constipation. Avoids caffeine. She is sensitive with fiber. Uses imodie   Bowel movements per day: 2    Straining to empty bowels: No    Splinting to evacuate: Yes   Painful evacuation: No    Difficulty emptying rectum: No    Incontinence to stool: yes, not too often with diet/caffeine management. Once every few months, staining   Blood in stool: Yes - hemorrhoids   Hemorrhoids: Yes   Bowel conditions: IBS      Sexual function:    Sexually active: Yes   Gender of partners: Male   Pain with intercourse: No        Past medical and surgical history    Past obstetric history   Number of vaginal deliveries: 2   Number of  deliveries: 0   History of vacuum/forceps: No    History of obstetric anal sphincter injury: Yes    Past gynecological history:    Last menstrual period: No LMP recorded. (Menstrual status: Irregular Menses).   History of abnormal uterine bleeding: No    History of fibroids: No    History of endometrial polyps:  No    History of endometriosis: No    History of cervical dysplasia: No    Last pap:    Current contraception: loloestrin      Past medical history:  Past Medical History:   Diagnosis Date    Pain 2023    right wrist    Acute nasopharyngitis 2022    3 weeks ago     Past surgical history:  Past Surgical History:   Procedure Laterality Date    GANGLION EXCISION Right 3/27/2023    Procedure: RIGHT WRIST GANGLION CYST EXCISION;  Surgeon: Estelita Francois M.D.;  Location: SURGERY SAME DAY [facility];  Service: Orthopedics    PB KNEE SCOPE,DIAGNOSTIC Right 10/26/2022    Procedure: Knee arthroscopy with chondroplasty, limited synovectomy, open cyst excision with hardware removal and bone grafting, surgeries as indicated;  Surgeon: Ventura PATTERSON  DOMINGUEZ Dela Cruz;  Location: SURGERY [facility];  Service: Orthopedics    PB EXCIS TENDON SHEATH LESION, HAND/FINGER Right 10/26/2022    Procedure: EXCISION, CYST;  Surgeon: Ventura Dela Cruz M.D.;  Location: SURGERY [facility];  Service: Orthopedics    CHONDROPLASTY Right 10/26/2022    Procedure: CHONDROPLASTY;  Surgeon: Ventura Dela Cruz M.D.;  Location: SURGERY [facility];  Service: Orthopedics    SYNOVECTOMY Right 10/26/2022    Procedure: SYNOVECTOMY;  Surgeon: Ventura Dela Cruz M.D.;  Location: [facility];  Service: Orthopedics    HARDWARE REMOVAL ORTHO Right 10/26/2022    Procedure: REMOVAL, HARDWARE;  Surgeon: Ventura Dela Cruz M.D.;  Location: [facility];  Service: Orthopedics    BONE GRAFT Right 10/26/2022    Procedure: BONE GRAFT;  Surgeon: Ventura Dela Cruz M.D.;  Location: [facility];  Service: Orthopedics    PB EXCIS TENDON SHEATH LESION, HAND/FINGER Right 4/11/2022    Procedure: EXCISION OF GANGLION CYST RIGHT WRIST;  Surgeon: Estelita Francois M.D.;  Location: SURGERY SAME DAY [facility];  Service: Orthopedics    OTHER ORTHOPEDIC SURGERY Right 03/2020    ACL    OTHER ORTHOPEDIC SURGERY Left 04/2017    ACL     Medications:has a current medication list which includes the following prescription(s): viberzi, spironolactone, sumatriptan, nortriptyline, colestipol, naratriptan, ubrogepant, ubrelvy, celecoxib, magnesium oxide, riboflavin, b complex-c, lo loestrin fe, and cetirizine hcl.  Allergies:Ceclor [cefaclor] and Penicillins  Family history:No family history on file.  Social history: reports that she has never smoked. She has never been exposed to tobacco smoke. She has never used smokeless tobacco. She reports current alcohol use of about 1.8 oz of alcohol per week. She reports that she does not use drugs.    Review of systems: A full review of systems was performed, and negative with the exception of want is noted above in the HPI.        Objective        BP (!) 145/89  (BP Location: Left arm, Patient Position: Sitting, BP Cuff Size: Adult)   Pulse 70   Wt 165 lb   BMI 27.46 kg/m²     Physical Exam  Vitals reviewed. Exam conducted with a chaperone present (MA - see notes.).   Constitutional:       Appearance: Normal appearance.   HENT:      Head: Normocephalic.      Mouth/Throat:      Mouth: Mucous membranes are moist.   Cardiovascular:      Rate and Rhythm: Normal rate.   Pulmonary:      Effort: Pulmonary effort is normal.   Abdominal:      Palpations: Abdomen is soft. There is no mass.      Tenderness: There is no abdominal tenderness.   Skin:     General: Skin is warm and dry.   Neurological:      Mental Status: She is alert.   Psychiatric:         Mood and Affect: Mood normal.         Genitourinary:    External female genitalia: WNL   Vulva: WNL   Bulbocavernosus reflex: Intact   Anal wink reflex: Intact   Perineal sensation: WNL   Urethra: Hypermobile   Vagina: WNL   Atrophy: None   Cough stress test: Negative    Pelvic floor:    POP-Q: Prior Aa 0 / Ba 0 / TVL 10 / C -4 / D -6 / Ap -1 / Bp -1 / GH 4 / PB 2   Palpable rectocele and perineocele   Prolapse stage: 2   Paravaginal defect: mild   Cervical elongation: No    Urethral tenderness: No    Bladder/ suprapubic tenderness: No    Levator tenderness: None   Levator muscle tone: Hypertonic   Pelvic floor contraction strength (modified Oxford scale): 3=Moderate   Pelvic floor contraction duration: Normal   Bimanual exam: Small, Mobile Uterus   Anal resting tone: WNL   Anal squeeze tone: WNL   Palpable anal sphincter defect: No    Granulation tissue: No    Epithelial erosions: No    Epithelial ulcerations: No    Fistula: None   Vaginal band/stricture: No     Procedure Performed: No    Diagnostic test and records review:       Post-void residual: 12 mL, performed by Bladder Scanner    Radiology: n/a    Documentation reviewed: Prior EMR Records           Assessment & Plan     Estelita Gates is a 43 y.o. year old P2 with  "symptomatic stage II uterovaginal prolapse, outlet dysfunction constipation, fecal staining.     Incomplete uterovaginal prolapse   Cystocele, midline   Rectocele   Outlet dysfunction constipation   Perineocele  -She currently has a pessary for conservative management, but this is not a long term option for her.  -He had an extensive discussion today about the surgical options for prolapse.  We previously discussed both a uterine sparing laparoscopic hysteropexy with native tissue repairs as a primary option, but she also wanted to discuss the more robust option of a sacrocolpopexy.  With the latter procedure (sacrocolpopexy) there is a lower risk of recurrence long-term since the mesh is stronger than her own tissues.  I do perform this with a hysterectomy but leave the cervix in place in order to decrease mesh exposure risks.  Due to her significant activity this may be a better option long-term about preventing prolapse in the future, however native tissue hysteropexy procedure is also a viable option due to the moderate nature of her uterine descent.  However, native tissue repair has a high recurrence rate of approximately 20% needing retreatment long-term (compared to 5% or less for sacrocolpopexy).  -She will continue to consider her options and send a message when she would like to schedule, and she will let me know whether she prefers the \"mesh\" procedure with hysterectomy or if nonmesh uterine sparing procedure.    Vaginal spotting  She has occasional vaginal spotting which can be disruptive to some of her activities.  She is currently taking Loestrin on a continuous basis without having periods.  I counseled that spotting is very common with continuous OCP treatments, and some of her options include taking the placebo pills and having a period every 3 months to allow the uterine lining to shed, or potentially a different dosing and her contraceptive pills.  We are not able to discuss this in significant " detail due to our prior surgical discussions, but she will consider having timed periods    Bartholin cyst  She has had a subjective feeling of Bartholin cyst which resolved on its own, cyst in the lower aspect of the vulva.  I counseled her to monitor the symptoms and she has an acute inflammation which is painful to call and at her cell phone for an evaluation appointment.  First time treatment can include a drainage in the office, but if these recur long-term she may require surgical excision or marsupialization.    Fecal staining  She is now s/p hemorrhoid banding, but has persistent issues.  She understands that with her prolapse repair and repair of the perineum she likely has a chance of improving the symptoms due to improved rectal emptying.    Internal hemorrhoids  Sp banding 6/2023             Pranav Mohan MD, FACOG  Urogynecology and Pelvic Reconstructive Surgery  Department of Obstetrics and Gynecology  [facility]        This medical record contains text that has been entered with the assistance of computer voice recognition and dictation software.  Therefore, it may contain unintended errors in text, spelling, punctuation, or grammar

## 2024-02-08 ENCOUNTER — GYNECOLOGY VISIT (OUTPATIENT)
Dept: GYNECOLOGY | Facility: CLINIC | Age: 44
End: 2024-02-08
Payer: COMMERCIAL

## 2024-02-08 ENCOUNTER — OFFICE VISIT (OUTPATIENT)
Dept: NEUROLOGY | Facility: MEDICAL CENTER | Age: 44
End: 2024-02-08
Attending: PSYCHIATRY & NEUROLOGY
Payer: COMMERCIAL

## 2024-02-08 VITALS
BODY MASS INDEX: 32.22 KG/M2 | HEIGHT: 60 IN | HEART RATE: 85 BPM | OXYGEN SATURATION: 99 % | DIASTOLIC BLOOD PRESSURE: 80 MMHG | TEMPERATURE: 98.7 F | SYSTOLIC BLOOD PRESSURE: 128 MMHG | RESPIRATION RATE: 16 BRPM

## 2024-02-08 VITALS
BODY MASS INDEX: 27.46 KG/M2 | HEART RATE: 70 BPM | SYSTOLIC BLOOD PRESSURE: 145 MMHG | DIASTOLIC BLOOD PRESSURE: 89 MMHG | WEIGHT: 165 LBS

## 2024-02-08 DIAGNOSIS — N81.81 PERINEOCELE: ICD-10-CM

## 2024-02-08 DIAGNOSIS — G43.709 CHRONIC MIGRAINE W/O AURA W/O STATUS MIGRAINOSUS, NOT INTRACTABLE: Primary | ICD-10-CM

## 2024-02-08 DIAGNOSIS — N81.6 RECTOCELE: ICD-10-CM

## 2024-02-08 DIAGNOSIS — N81.11 CYSTOCELE, MIDLINE: ICD-10-CM

## 2024-02-08 DIAGNOSIS — N81.2 INCOMPLETE UTEROVAGINAL PROLAPSE: ICD-10-CM

## 2024-02-08 DIAGNOSIS — K59.02 OUTLET DYSFUNCTION CONSTIPATION: ICD-10-CM

## 2024-02-08 DIAGNOSIS — R15.1 FECAL SMEARING: ICD-10-CM

## 2024-02-08 DIAGNOSIS — N75.0 BARTHOLIN CYST: ICD-10-CM

## 2024-02-08 DIAGNOSIS — N92.0 SPOTTING: ICD-10-CM

## 2024-02-08 PROCEDURE — 3074F SYST BP LT 130 MM HG: CPT | Performed by: PSYCHIATRY & NEUROLOGY

## 2024-02-08 PROCEDURE — 64615 CHEMODENERV MUSC MIGRAINE: CPT | Performed by: PSYCHIATRY & NEUROLOGY

## 2024-02-08 PROCEDURE — 3077F SYST BP >= 140 MM HG: CPT | Performed by: STUDENT IN AN ORGANIZED HEALTH CARE EDUCATION/TRAINING PROGRAM

## 2024-02-08 PROCEDURE — 99213 OFFICE O/P EST LOW 20 MIN: CPT | Performed by: STUDENT IN AN ORGANIZED HEALTH CARE EDUCATION/TRAINING PROGRAM

## 2024-02-08 PROCEDURE — 3079F DIAST BP 80-89 MM HG: CPT | Performed by: PSYCHIATRY & NEUROLOGY

## 2024-02-08 PROCEDURE — 3079F DIAST BP 80-89 MM HG: CPT | Performed by: STUDENT IN AN ORGANIZED HEALTH CARE EDUCATION/TRAINING PROGRAM

## 2024-02-08 PROCEDURE — 700111 HCHG RX REV CODE 636 W/ 250 OVERRIDE (IP): Performed by: PSYCHIATRY & NEUROLOGY

## 2024-02-08 PROCEDURE — 700101 HCHG RX REV CODE 250: Performed by: PSYCHIATRY & NEUROLOGY

## 2024-02-08 RX ADMIN — SODIUM CHLORIDE 155 UNITS: 9 INJECTION, SOLUTION INTRAMUSCULAR; INTRAVENOUS; SUBCUTANEOUS at 15:33

## 2024-02-08 ASSESSMENT — PATIENT HEALTH QUESTIONNAIRE - PHQ9: CLINICAL INTERPRETATION OF PHQ2 SCORE: 0

## 2024-02-12 ENCOUNTER — PATIENT MESSAGE (OUTPATIENT)
Dept: GYNECOLOGY | Facility: CLINIC | Age: 44
End: 2024-02-12
Payer: COMMERCIAL

## 2024-02-12 DIAGNOSIS — Z30.41 ENCOUNTER FOR SURVEILLANCE OF CONTRACEPTIVE PILLS: ICD-10-CM

## 2024-02-13 RX ORDER — NORETHINDRONE ACETATE AND ETHINYL ESTRADIOL, ETHINYL ESTRADIOL AND FERROUS FUMARATE 1MG-10(24)
1 KIT ORAL DAILY
Qty: 28 TABLET | Refills: 12 | Status: SHIPPED | OUTPATIENT
Start: 2024-02-13

## 2024-02-21 DIAGNOSIS — G43.709 CHRONIC MIGRAINE W/O AURA W/O STATUS MIGRAINOSUS, NOT INTRACTABLE: Primary | ICD-10-CM

## 2024-02-22 RX ORDER — NARATRIPTAN 2.5 MG/1
TABLET ORAL
Qty: 36 TABLET | Refills: 3 | Status: SHIPPED | OUTPATIENT
Start: 2024-02-22 | End: 2024-05-22

## 2024-02-22 NOTE — TELEPHONE ENCOUNTER
Received request via: Pharmacy    Medication Name/Dosage naratriptan (AMERGE) 2.5 MG tablet     When was medication last prescribed 10/23/23    How many refills were previously provided 0    How many Refills does he patient have left from last prescription 0    Was the patient seen in the last year in this department? Yes   Date of last office visit 02/08/24     Per last Neurology Office Visit, when was the date of next follow up visit set for?                            Date of office visit follow up request 3 month      Does the patient have an upcoming appointment? Yes   If yes, when 05/02/24               If no, schedule appointment N/A     Does the patient have FPC Plus and need 100 day supply (blood pressure, diabetes and cholesterol meds only)? Patient does not have SCP

## 2024-02-26 ENCOUNTER — PATIENT MESSAGE (OUTPATIENT)
Dept: NEUROLOGY | Facility: MEDICAL CENTER | Age: 44
End: 2024-02-26
Payer: COMMERCIAL

## 2024-02-26 RX ORDER — OXYMETAZOLINE HYDROCHLORIDE OPHTHALMIC 1 MG/ML
SOLUTION/ DROPS OPHTHALMIC
Qty: 45 EACH | Refills: 0 | Status: SHIPPED | OUTPATIENT
Start: 2024-02-26

## 2024-05-02 ENCOUNTER — APPOINTMENT (OUTPATIENT)
Dept: NEUROLOGY | Facility: MEDICAL CENTER | Age: 44
End: 2024-05-02
Attending: PSYCHIATRY & NEUROLOGY
Payer: COMMERCIAL

## 2024-05-15 ENCOUNTER — OFFICE VISIT (OUTPATIENT)
Dept: NEUROLOGY | Facility: MEDICAL CENTER | Age: 44
End: 2024-05-15
Attending: PSYCHIATRY & NEUROLOGY
Payer: COMMERCIAL

## 2024-05-15 VITALS
SYSTOLIC BLOOD PRESSURE: 132 MMHG | BODY MASS INDEX: 30.38 KG/M2 | DIASTOLIC BLOOD PRESSURE: 74 MMHG | WEIGHT: 154.76 LBS | TEMPERATURE: 98.1 F | HEIGHT: 60 IN | OXYGEN SATURATION: 95 % | HEART RATE: 89 BPM

## 2024-05-15 DIAGNOSIS — G43.709 CHRONIC MIGRAINE W/O AURA W/O STATUS MIGRAINOSUS, NOT INTRACTABLE: Primary | ICD-10-CM

## 2024-05-15 PROCEDURE — 3075F SYST BP GE 130 - 139MM HG: CPT | Performed by: PSYCHIATRY & NEUROLOGY

## 2024-05-15 PROCEDURE — 3078F DIAST BP <80 MM HG: CPT | Performed by: PSYCHIATRY & NEUROLOGY

## 2024-05-15 PROCEDURE — 64615 CHEMODENERV MUSC MIGRAINE: CPT | Performed by: PSYCHIATRY & NEUROLOGY

## 2024-05-15 RX ADMIN — SODIUM CHLORIDE 155 UNITS: 9 INJECTION INTRAMUSCULAR; INTRAVENOUS; SUBCUTANEOUS at 16:31

## 2024-05-15 NOTE — PROGRESS NOTES
[facility]  BOTOX PROCEDURE NOTE    Chronic Migraine:  Botox therapy has reduced patient’s migraines by more than 7 days and/or 100 hours per month.     I treated Estelita Gates in clinic today with BotoxA 155 units according to the dosing/injection paradigm currently mandated by the FDA for the management of chronic migraine.  Specifically, I injected:  - 5 units to the procerus,  - 5 units to the corrugators (bilaterally),  - a total of 20 units to the frontalis musculature,  - 20 units to the temporalis (bilaterally),  - 15 units to the occipitalis (bilaterally),  - 10 units to the cervical paraspinals (bilaterally), and  - 15 units to the trapezius musculature (bilaterally).    The remainder of the Botox was discarded as wastage per FDA guidelines  Consent on file.  Patient identify verified with 2 patient identifiers.     Frequency of headaches is >15 days monthly with at least 8 migraines monthly.    Migraines include at least two of the following: worsened with activity or avoidance of activity with migraines (ie they go lie down), moderate to severe pain intensity, pulsing headache, unilateral headache and has  have either nausea or vomiting OR sensitivity to light and sound.     Although Estelita Gates is responding to botox s/he is NOT migraine free.  I recommend that Botox be continued at this time.    Estelita Gates has chronic migraines, defined as having 15 or more headaches days per month, 8 of which are migraines, over a minimum of the last three months.  Episodes last more than 4 hours (untreated).  Pt has 2 or more of following (see initial note):  - headache worsened with activity  - pain is moderate to severe intensity  - pulsing in nature  - unilateral   and patient either has nausea/vomiting OR sensitivity to light and sound.    No adverse effect of Botox noted at conclusion of today's appointment.    Follow up in 12 weeks for Botox or sooner if needed.    Signed: Willis  PAOOL Jones M.D.

## 2024-05-24 ENCOUNTER — TELEPHONE (OUTPATIENT)
Dept: PHARMACY | Facility: MEDICAL CENTER | Age: 44
End: 2024-05-24
Payer: COMMERCIAL

## 2024-05-24 NOTE — TELEPHONE ENCOUNTER
Ubrogepant (UBRELVY) 50 MG Tab     PA renewal      Initiated PA in cmm Key: [id number]    Insurer to decide

## 2024-05-31 ENCOUNTER — PATIENT MESSAGE (OUTPATIENT)
Dept: NEUROLOGY | Facility: MEDICAL CENTER | Age: 44
End: 2024-05-31
Payer: COMMERCIAL

## 2024-05-31 DIAGNOSIS — Z76.0 REFILL CLINIC MEDICATION MANAGEMENT PATIENT: ICD-10-CM

## 2024-05-31 DIAGNOSIS — G43.709 CHRONIC MIGRAINE WITHOUT AURA WITHOUT STATUS MIGRAINOSUS, NOT INTRACTABLE: ICD-10-CM

## 2024-06-15 RX ORDER — UBROGEPANT 50 MG/1
TABLET ORAL
Qty: 10 TABLET | Refills: 12 | Status: SHIPPED | OUTPATIENT
Start: 2024-06-15 | End: 2025-06-09

## 2024-06-26 ENCOUNTER — RX ONLY (OUTPATIENT)
Age: 44
Setting detail: RX ONLY
End: 2024-06-26

## 2024-06-26 RX ORDER — TRETIONIN 0.25 MG/G
CREAM TOPICAL
Qty: 45 | Refills: 2 | Status: ERX | COMMUNITY
Start: 2024-06-26

## 2024-07-14 ENCOUNTER — PATIENT MESSAGE (OUTPATIENT)
Dept: GYNECOLOGY | Facility: CLINIC | Age: 44
End: 2024-07-14
Payer: COMMERCIAL

## 2024-07-14 DIAGNOSIS — Z30.41 ENCOUNTER FOR SURVEILLANCE OF CONTRACEPTIVE PILLS: ICD-10-CM

## 2024-07-15 RX ORDER — NORETHINDRONE ACETATE AND ETHINYL ESTRADIOL, ETHINYL ESTRADIOL AND FERROUS FUMARATE 1MG-10(24)
1 KIT ORAL DAILY
Qty: 28 TABLET | Refills: 12 | Status: SHIPPED | OUTPATIENT
Start: 2024-07-15

## 2024-08-02 ENCOUNTER — RX ONLY (OUTPATIENT)
Age: 44
Setting detail: RX ONLY
End: 2024-08-02

## 2024-08-02 RX ORDER — TRETIONIN 0.25 MG/G
CREAM TOPICAL
Qty: 45 | Refills: 11 | Status: ERX

## 2024-08-03 ENCOUNTER — APPOINTMENT (RX ONLY)
Dept: URBAN - METROPOLITAN AREA CLINIC 36 | Facility: CLINIC | Age: 44
Setting detail: DERMATOLOGY
End: 2024-08-03

## 2024-08-03 ENCOUNTER — RX ONLY (OUTPATIENT)
Age: 44
Setting detail: RX ONLY
End: 2024-08-03

## 2024-08-06 ENCOUNTER — RX ONLY (OUTPATIENT)
Age: 44
Setting detail: RX ONLY
End: 2024-08-06

## 2024-08-06 RX ORDER — TRETIONIN 0.25 MG/G
CREAM TOPICAL
Qty: 135 | Refills: 11 | Status: CANCELLED

## 2024-08-08 ENCOUNTER — OFFICE VISIT (OUTPATIENT)
Dept: NEUROLOGY | Facility: MEDICAL CENTER | Age: 44
End: 2024-08-08
Attending: PSYCHIATRY & NEUROLOGY
Payer: COMMERCIAL

## 2024-08-08 VITALS
OXYGEN SATURATION: 99 % | HEART RATE: 62 BPM | DIASTOLIC BLOOD PRESSURE: 62 MMHG | WEIGHT: 143.08 LBS | TEMPERATURE: 97.1 F | SYSTOLIC BLOOD PRESSURE: 114 MMHG | RESPIRATION RATE: 15 BRPM | HEIGHT: 65 IN | BODY MASS INDEX: 23.84 KG/M2

## 2024-08-08 DIAGNOSIS — G43.709 CHRONIC MIGRAINE WITHOUT AURA WITHOUT STATUS MIGRAINOSUS, NOT INTRACTABLE: Primary | ICD-10-CM

## 2024-08-08 PROCEDURE — 700101 HCHG RX REV CODE 250

## 2024-08-08 PROCEDURE — 64615 CHEMODENERV MUSC MIGRAINE: CPT | Performed by: PSYCHIATRY & NEUROLOGY

## 2024-08-08 PROCEDURE — 700111 HCHG RX REV CODE 636 W/ 250 OVERRIDE (IP)

## 2024-08-08 PROCEDURE — 3074F SYST BP LT 130 MM HG: CPT | Performed by: PSYCHIATRY & NEUROLOGY

## 2024-08-08 PROCEDURE — 3078F DIAST BP <80 MM HG: CPT | Performed by: PSYCHIATRY & NEUROLOGY

## 2024-08-08 RX ADMIN — SODIUM CHLORIDE 155 UNITS: 9 INJECTION INTRAMUSCULAR; INTRAVENOUS; SUBCUTANEOUS at 10:26

## 2024-10-31 ENCOUNTER — OFFICE VISIT (OUTPATIENT)
Dept: NEUROLOGY | Facility: MEDICAL CENTER | Age: 44
End: 2024-10-31
Attending: PSYCHIATRY & NEUROLOGY
Payer: COMMERCIAL

## 2024-10-31 ENCOUNTER — RX ONLY (OUTPATIENT)
Age: 44
Setting detail: RX ONLY
End: 2024-10-31

## 2024-10-31 VITALS
RESPIRATION RATE: 12 BRPM | DIASTOLIC BLOOD PRESSURE: 76 MMHG | WEIGHT: 143 LBS | HEIGHT: 65 IN | HEART RATE: 80 BPM | OXYGEN SATURATION: 98 % | BODY MASS INDEX: 23.82 KG/M2 | TEMPERATURE: 97.2 F | SYSTOLIC BLOOD PRESSURE: 108 MMHG

## 2024-10-31 DIAGNOSIS — G43.709 CHRONIC MIGRAINE WITHOUT AURA WITHOUT STATUS MIGRAINOSUS, NOT INTRACTABLE: Primary | ICD-10-CM

## 2024-10-31 PROCEDURE — 700111 HCHG RX REV CODE 636 W/ 250 OVERRIDE (IP): Mod: JZ

## 2024-10-31 PROCEDURE — 700101 HCHG RX REV CODE 250

## 2024-10-31 PROCEDURE — 64615 CHEMODENERV MUSC MIGRAINE: CPT | Performed by: PSYCHIATRY & NEUROLOGY

## 2024-10-31 RX ORDER — TRETIONIN 0.25 MG/G
CREAM TOPICAL
Qty: 45 | Refills: 3 | Status: ERX

## 2024-10-31 RX ADMIN — SODIUM CHLORIDE 155 UNITS: 9 INJECTION INTRAMUSCULAR; INTRAVENOUS; SUBCUTANEOUS at 10:28

## 2024-10-31 ASSESSMENT — PATIENT HEALTH QUESTIONNAIRE - PHQ9: CLINICAL INTERPRETATION OF PHQ2 SCORE: 0

## 2024-11-04 ENCOUNTER — PATIENT MESSAGE (OUTPATIENT)
Dept: NEUROLOGY | Facility: MEDICAL CENTER | Age: 44
End: 2024-11-04
Payer: COMMERCIAL

## 2024-11-04 DIAGNOSIS — G43.709 CHRONIC MIGRAINE WITHOUT AURA WITHOUT STATUS MIGRAINOSUS, NOT INTRACTABLE: Primary | ICD-10-CM

## 2024-11-06 ENCOUNTER — TELEPHONE (OUTPATIENT)
Dept: PHARMACY | Facility: MEDICAL CENTER | Age: 44
End: 2024-11-06
Payer: COMMERCIAL

## 2024-11-06 NOTE — TELEPHONE ENCOUNTER
Received New Start request via [employer]  for Ubrogepant 100 MG Tab . (Quantity:8, Day Supply:30)     Insurance: [facility] [employer]  Member ID:  [id number]  BIN: [id number]  PCN: [id number]  Group: [id number]     Ran Test claim via [employer] & medication  RTC RTS - will release to pt's preferred pharmacy. Looks like pt wants the medication sent to [facility].

## 2024-11-13 ENCOUNTER — TELEPHONE (OUTPATIENT)
Dept: NEUROLOGY | Facility: MEDICAL CENTER | Age: 44
End: 2024-11-13
Payer: COMMERCIAL

## 2024-11-13 NOTE — TELEPHONE ENCOUNTER
Good afternoon,     Would you please arrange for me to see this patient for a talk visit to discuss headache?  Turns out I've been giving her Botox for years, and I've never actually sat down with her to discuss her overall headache management plan.  A 40-minute slot would be appreciated.     Thanks     -ZITA

## 2025-01-13 ENCOUNTER — APPOINTMENT (OUTPATIENT)
Dept: NEUROLOGY | Facility: MEDICAL CENTER | Age: 45
End: 2025-01-13
Attending: PSYCHIATRY & NEUROLOGY
Payer: COMMERCIAL

## 2025-01-14 ENCOUNTER — OFFICE VISIT (OUTPATIENT)
Dept: NEUROLOGY | Facility: MEDICAL CENTER | Age: 45
End: 2025-01-14
Attending: PSYCHIATRY & NEUROLOGY
Payer: COMMERCIAL

## 2025-01-14 VITALS
DIASTOLIC BLOOD PRESSURE: 60 MMHG | SYSTOLIC BLOOD PRESSURE: 118 MMHG | HEART RATE: 82 BPM | WEIGHT: 145.5 LBS | HEIGHT: 65 IN | OXYGEN SATURATION: 98 % | TEMPERATURE: 97.7 F | RESPIRATION RATE: 16 BRPM | BODY MASS INDEX: 24.24 KG/M2

## 2025-01-14 DIAGNOSIS — G43.901 STATUS MIGRAINOSUS: ICD-10-CM

## 2025-01-14 DIAGNOSIS — G43.709 CHRONIC MIGRAINE WITHOUT AURA WITHOUT STATUS MIGRAINOSUS, NOT INTRACTABLE: ICD-10-CM

## 2025-01-14 PROCEDURE — 3078F DIAST BP <80 MM HG: CPT | Performed by: PSYCHIATRY & NEUROLOGY

## 2025-01-14 PROCEDURE — 3074F SYST BP LT 130 MM HG: CPT | Performed by: PSYCHIATRY & NEUROLOGY

## 2025-01-14 PROCEDURE — 99215 OFFICE O/P EST HI 40 MIN: CPT | Performed by: PSYCHIATRY & NEUROLOGY

## 2025-01-14 PROCEDURE — 99212 OFFICE O/P EST SF 10 MIN: CPT | Performed by: PSYCHIATRY & NEUROLOGY

## 2025-01-14 RX ORDER — AMITRIPTYLINE HYDROCHLORIDE 10 MG/1
20 TABLET ORAL NIGHTLY
Qty: 60 TABLET | Refills: 11 | Status: SHIPPED | OUTPATIENT
Start: 2025-01-14 | End: 2025-01-23 | Stop reason: SDUPTHER

## 2025-01-14 RX ORDER — DEXAMETHASONE 2 MG/1
TABLET ORAL
Qty: 10 TABLET | Refills: 0 | Status: SHIPPED | OUTPATIENT
Start: 2025-01-14 | End: 2025-01-18

## 2025-01-14 ASSESSMENT — PATIENT HEALTH QUESTIONNAIRE - PHQ9: CLINICAL INTERPRETATION OF PHQ2 SCORE: 0

## 2025-01-14 NOTE — PROGRESS NOTES
"[facility] NEUROLOGY  GENERAL NEUROLOGY  NEW PATIENT VISIT    CC: chronic migraine    HISTORY OF ILLNESS:  Estelita Gates is a 44 y.o. woman with a history most notable for chronic migraine and asthma.  Today, she was unaccompanied, and she provided the following history:    The following is a summary of headache symptoms, presented in my standard format:    Family History: none  Age at onset (years): 38  Location: bi-temporal, bi-frontal, nose  Radiation: none  Frequency: baseline: daily, lately: since 11/2024: daily  Duration: baseline: a couple of hours, lately: hours  Headache Days/Month: baseline: 30/30, lately: 30/30  Quality: baseline: \"throbbing,\" lately: \"pressure,\" like someone has a vice on her nose  Intensity: baseline: 7-8/10, lately: \"annoying\" 5-8/10  Aura: none  Photophobia/Phonophobia/Nausea/Vomiting: yes/yes/yes/no  Provoked by Physical Activity?: not during exercise, headache often come on after exercise has just been completed  Triggers: caffeine, sleep deprivation, dehydration  Associated Symptoms: none  Autonomic Signs (such as ptosis, miosis, conjunctival injection, rhinorrhea, increased lacrimation): none  Head Trauma: none  Association with Menses: none  ED Visits: none  Hospitalizations: none  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  Sleep (hours/night): 6-7  Caffeine Intake: 1/day  Hydration: tries, probably doesn't drink enough water  Nutrition: regular meals  Exercise:   Analgesic Overuse:     Current Medication Regimen:  - nortriptyline: effective, associated with fatigue (doses at night) and dry mouth  - Botox: helpful  - riboflavin  - naratriptan: effective, fewer side effects than sumatriptan  - Ubrelvy: is effective but associated with intolerable fatigue  - ondansetron: doesn't use this often, no side effects    Medications Tried: Response  Preventive:  - topiramate: associate with nausea, malaise, fatigue, and hot flashes  - Ajovy: worked well x3 months but then " lost effectiveness  - Aimovig: ineffective  - Qulipta: ineffective    Rescue:  - sumatriptan: 100 mg is effective but associated with intolerable fatigue, doesn't take this often  - rizatriptan: not particularly effective, no negative side effects  - Nurtec: worked for a while, then lost effectiveness    Medications Not Tried:  -     MEDICAL AND SURGICAL HISTORY:  Past Medical History:   Diagnosis Date    Acute nasopharyngitis 04/05/2022    3 weeks ago    Pain 03/13/2023    right wrist     Past Surgical History:   Procedure Laterality Date    GANGLION EXCISION Right 3/27/2023    Procedure: RIGHT WRIST GANGLION CYST EXCISION;  Surgeon: Estelita Francois M.D.;  Location: SURGERY SAME DAY [facility];  Service: Orthopedics    PB KNEE SCOPE,DIAGNOSTIC Right 10/26/2022    Procedure: Knee arthroscopy with chondroplasty, limited synovectomy, open cyst excision with hardware removal and bone grafting, surgeries as indicated;  Surgeon: Ventura Dela Cruz M.D.;  Location: [facility];  Service: Orthopedics    PB EXCIS TENDON SHEATH LESION, HAND/FINGER Right 10/26/2022    Procedure: EXCISION, CYST;  Surgeon: Ventura Dela Cruz M.D.;  Location: [facility];  Service: Orthopedics    CHONDROPLASTY Right 10/26/2022    Procedure: CHONDROPLASTY;  Surgeon: Ventura Dela Cruz M.D.;  Location: [facility];  Service: Orthopedics    SYNOVECTOMY Right 10/26/2022    Procedure: SYNOVECTOMY;  Surgeon: Ventura Dela Cruz M.D.;  Location: [facility];  Service: Orthopedics    HARDWARE REMOVAL ORTHO Right 10/26/2022    Procedure: REMOVAL, HARDWARE;  Surgeon: Ventura Dela Cruz M.D.;  Location: [facility];  Service: Orthopedics    BONE GRAFT Right 10/26/2022    Procedure: BONE GRAFT;  Surgeon: Ventura Dela Cruz M.D.;  Location: [facility];  Service: Orthopedics    PB EXCIS TENDON SHEATH LESION, HAND/FINGER Right 4/11/2022    Procedure: EXCISION OF GANGLION CYST RIGHT WRIST;  Surgeon: Estelita Edge  DOMINGUEZ Francois;  Location: SURGERY SAME DAY [facility];  Service: Orthopedics    OTHER ORTHOPEDIC SURGERY Right 03/2020    ACL    OTHER ORTHOPEDIC SURGERY Left 04/2017    ACL     MEDICATIONS:  Current Outpatient Medications   Medication Sig    amitriptyline (ELAVIL) 10 MG Tab Take 2 Tablets by mouth every evening for 360 days.    dexamethasone (DECADRON) 2 MG tablet Take 4 Tablets by mouth every day for 1 day, THEN 3 Tablets every day for 1 day, THEN 2 Tablets every day for 1 day, THEN 1 Tablet every day for 1 day.    Galcanezumab-gnlm (EMGALITY) 120 MG/ML Solution Auto-injector Loading dose: 240 mg followed by 120 mg every month.    Ubrogepant 100 MG Tab Take 100 mg at the onset of aura/HA; may re-dose x1 after 2 hrs if HA persists; MDD: 200 mg    meloxicam (MOBIC) 15 MG tablet TAKE 1 TABLET BY MOUTH ONCE  DAILY    LO LOESTRIN FE 1 MG-10 MCG / 10 MCG Tab Take 1 Tablet by mouth every day.    VIBERZI 100 MG Tab     colestipol (COLESTID) 1 GM Tab     celecoxib (CELEBREX) 200 MG Cap Take 1 Capsule by mouth every day.    magnesium oxide (MAG-OX) 400 MG Tab tablet Take 400 mg by mouth every day.    Riboflavin 400 MG Cap Take  by mouth every day.    B Complex-C (SUPER B COMPLEX PO) Take  by mouth every day.    Cetirizine HCl (ZYRTEC ALLERGY PO) Take 1 Tablet by mouth every day.     SOCIAL HISTORY:  Social History     Tobacco Use    Smoking status: Never     Passive exposure: Never    Smokeless tobacco: Never   Substance Use Topics    Alcohol use: Yes     Alcohol/week: 1.8 oz     Types: 3 Cans of beer per week     Comment: 2 drinks/week     Social History     Social History Narrative    Not on file     FAMILY HISTORY:  No family history on file.    REVIEW OF SYSTEMS:  A ROS was completed.  Pertinent positives and negatives were included in the HPI, above.  All other systems were reviewed and are negative.    PHYSICAL EXAM:  General/Medical:  - NAD    Neuro:  MENTAL STATUS: awake and alert; no deficits of speech or language;  oriented to conversation; affect was appropriate to situation; pleasant, cooperative    CRANIAL NERVES:    II: acuity: NT, fields: NT, pupils: NT, discs: sharp    III/IV/VI: versions: grossly intact    V: facial sensation: NT    VII: facial expression: symmetric    VIII: hearing: intact to voice    IX/X: palate: NT    XI: shoulder shrug: NT    XII: tongue: NT    MOTOR:  - bulk: NT  - tone: NT  Upper Extremity Strength (R/L)    NT   Elbow flexion NT   Elbow extension NT   Shoulder abduction NT     Lower Extremity Strength  (R/L)   Hip flexion NT   Knee extension NT   Knee flexion NT   Ankle dorsiflexion NT   Ankle plantarflexion NT     - heel-walk: NT  - toe-walk: NT  - pronator drift: absent  - abnormal movements: none    SENSATION:  - light touch: NT  - vibration (R/L, seconds): NT at the great toes  - pinprick: NT  - proprioception: NT  - Romberg: NT    COORDINATION:  - finger to nose: NT  - finger tapping: NT    REFLEXES:  Reflex Right Left   BR NT NT   Biceps NT NT   Triceps NT NT   Patellae NT NT   Achilles NT NT   Toes NT NT     GAIT:  - NT    REVIEW OF IMAGING STUDIES:  No recent data available.    REVIEW OF LABORATORY STUDIES:  No recent data available.    ASSESSMENT:  Estelita Gates is a 44 y.o. woman with chronic migraine without aura and a history otherwise notable for asthma.  Her headaches are inexplicably worse since 11/2024.  Plans/recommendations as follows:    PLAN:  Status Migrainosus:  - dexamethasone taper    Chronic Migraine w/o Aura:  Prevention:  - continue Botox  - switch nortriptyline to amitriptyline  - continue supplementing:  - riboflavin (vitamin B2): 400 mg once daily  - if headaches remain poorly controlled after steroid taper, trial of Emgality (samples)  - get 7-9 hours of sleep per night; can try supplementing melatonin 2-10 mg, 2-3 hours before bedtime  - drink plenty of fluids (urine should be nearly clear)  - avoid excessive caffeine intake (no more than 2 servings per  "day and nothing in the afternoon)  - eat regular meals (don't skip meals)  - get moderate exercise (even just a 20 minute walk daily)    Rescue:  - continue Ubrelvy BID PRN  OR  - continue naratriptan: take this at the onset of aura or headache pain; may re-dose x1 after 2 hours if headache persists; do not use more than 2 days/week  - ondansetron 4 mg: take this at the onset of aura or headache to prevent or reduce nausea; may re-dose after 2 hours if headache or nausea persist; do not use more often than 3 days/week  - do not use analgesics (e.g., ibuprofen, acetaminophen) more than 2 days per week in order to avoid analgesic rebound headaches    - keep a headache log    - discussed repeat imaging and agreed to hold off for now    Follow-Up:  - Return in about 2 months (around 3/14/2025).    Signed: Willis Jones M.D.    BILLING DOCUMENTATION:   I spent 41 minutes reviewing the medical record, interviewing and examining the patient, discussing my impression (see \"assessment\" above), and coordinating care.  "

## 2025-01-17 ENCOUNTER — PHARMACY VISIT (OUTPATIENT)
Dept: PHARMACY | Facility: MEDICAL CENTER | Age: 45
End: 2025-01-17
Payer: COMMERCIAL

## 2025-01-17 PROCEDURE — RXMED WILLOW AMBULATORY MEDICATION CHARGE: Performed by: PSYCHIATRY & NEUROLOGY

## 2025-01-23 ENCOUNTER — OFFICE VISIT (OUTPATIENT)
Dept: NEUROLOGY | Facility: MEDICAL CENTER | Age: 45
End: 2025-01-23
Attending: PSYCHIATRY & NEUROLOGY
Payer: COMMERCIAL

## 2025-01-23 VITALS
WEIGHT: 142.64 LBS | SYSTOLIC BLOOD PRESSURE: 118 MMHG | TEMPERATURE: 98.2 F | OXYGEN SATURATION: 95 % | HEIGHT: 65 IN | BODY MASS INDEX: 23.76 KG/M2 | HEART RATE: 85 BPM | DIASTOLIC BLOOD PRESSURE: 76 MMHG | RESPIRATION RATE: 12 BRPM

## 2025-01-23 DIAGNOSIS — G43.709 CHRONIC MIGRAINE WITHOUT AURA WITHOUT STATUS MIGRAINOSUS, NOT INTRACTABLE: Primary | ICD-10-CM

## 2025-01-23 PROCEDURE — 64615 CHEMODENERV MUSC MIGRAINE: CPT | Performed by: PSYCHIATRY & NEUROLOGY

## 2025-01-23 PROCEDURE — 700101 HCHG RX REV CODE 250

## 2025-01-23 PROCEDURE — 3078F DIAST BP <80 MM HG: CPT | Performed by: PSYCHIATRY & NEUROLOGY

## 2025-01-23 PROCEDURE — 64615 CHEMODENERV MUSC MIGRAINE: CPT

## 2025-01-23 PROCEDURE — 700111 HCHG RX REV CODE 636 W/ 250 OVERRIDE (IP)

## 2025-01-23 PROCEDURE — 3074F SYST BP LT 130 MM HG: CPT | Performed by: PSYCHIATRY & NEUROLOGY

## 2025-01-23 RX ORDER — AMITRIPTYLINE HYDROCHLORIDE 10 MG/1
20 TABLET ORAL NIGHTLY
Qty: 60 TABLET | Refills: 11 | Status: SHIPPED | OUTPATIENT
Start: 2025-01-23 | End: 2026-01-18

## 2025-01-23 RX ADMIN — SODIUM CHLORIDE 155 UNITS: 9 INJECTION, SOLUTION INTRAMUSCULAR; INTRAVENOUS; SUBCUTANEOUS at 12:02

## 2025-01-23 ASSESSMENT — PATIENT HEALTH QUESTIONNAIRE - PHQ9: CLINICAL INTERPRETATION OF PHQ2 SCORE: 0

## 2025-02-04 DIAGNOSIS — G43.709 CHRONIC MIGRAINE WITHOUT AURA WITHOUT STATUS MIGRAINOSUS, NOT INTRACTABLE: Primary | ICD-10-CM

## 2025-02-04 RX ORDER — ZAVEGEPANT 10 MG/.1ML
SPRAY NASAL
Qty: 6 EACH | Refills: 11 | Status: SHIPPED | OUTPATIENT
Start: 2025-02-04 | End: 2026-02-04

## 2025-02-05 ENCOUNTER — TELEPHONE (OUTPATIENT)
Dept: PHARMACY | Facility: MEDICAL CENTER | Age: 45
End: 2025-02-05
Payer: COMMERCIAL

## 2025-02-05 NOTE — TELEPHONE ENCOUNTER
Prior Authorization for Zavegepant HCl (ZAVZPRET) 10 MG/ACT Solution  (Quantity: 6, Days: 30) has been submitted via Cover My Meds: Key ([id number])    Insurance: BCBS    Will follow up in 24-48 business hours.

## 2025-02-05 NOTE — TELEPHONE ENCOUNTER
Received New Start PA request via [employer]  for Zavegepant HCl (ZAVZPRET) 10 MG/ACT Solution. (Quantity:6, Day Supply:30)     Insurance: BCBS  Member ID:  [id number]  BIN: [id number]  PCN: [id number]  Group: [id number]     Ran Test claim via [place] & medication Rejects stating prior authorization is required.

## 2025-02-11 NOTE — TELEPHONE ENCOUNTER
Prior Authorization for Zavegepant HCl (ZAVZPRET) 10 MG/ACT Solution  has been approved for a quantity of 6 , day supply 30    Prior Authorization reference number: [id number]  Insurance: [facility]  Effective dates: 2/11/25 to 5/11/25   Copay: $0     Is patient eligible to fill with [facility]? Yes    Next Steps: The Patients copay is less than $5.00. Will contact the patient to determine choice of pharmacy, if applicable.

## 2025-02-11 NOTE — TELEPHONE ENCOUNTER
Called and left a voicemail for patient that the prior auth was approved and good to fill. Going to release to pt's preferred pharmacy as noted on rx.

## 2025-02-13 ENCOUNTER — APPOINTMENT (OUTPATIENT)
Dept: NEUROLOGY | Facility: MEDICAL CENTER | Age: 45
End: 2025-02-13
Attending: PSYCHIATRY & NEUROLOGY
Payer: COMMERCIAL

## 2025-02-13 DIAGNOSIS — G43.709 CHRONIC MIGRAINE WITHOUT AURA WITHOUT STATUS MIGRAINOSUS, NOT INTRACTABLE: Primary | ICD-10-CM

## 2025-02-13 RX ORDER — DULOXETIN HYDROCHLORIDE 30 MG/1
30 CAPSULE, DELAYED RELEASE ORAL DAILY
Qty: 90 CAPSULE | Refills: 2 | Status: SHIPPED | OUTPATIENT
Start: 2025-02-13 | End: 2025-02-25 | Stop reason: SDUPTHER

## 2025-02-18 PROCEDURE — RXMED WILLOW AMBULATORY MEDICATION CHARGE: Performed by: PSYCHIATRY & NEUROLOGY

## 2025-02-21 DIAGNOSIS — G43.709 CHRONIC MIGRAINE WITHOUT AURA WITHOUT STATUS MIGRAINOSUS, NOT INTRACTABLE: ICD-10-CM

## 2025-02-24 NOTE — TELEPHONE ENCOUNTER
Emgality 120mg/ml  Needs RX sent in, only received Sample dose 1/14/25.   Thank you.  [facility]

## 2025-02-25 ENCOUNTER — PATIENT MESSAGE (OUTPATIENT)
Dept: NEUROLOGY | Facility: MEDICAL CENTER | Age: 45
End: 2025-02-25
Payer: COMMERCIAL

## 2025-02-25 DIAGNOSIS — G43.709 CHRONIC MIGRAINE WITHOUT AURA WITHOUT STATUS MIGRAINOSUS, NOT INTRACTABLE: ICD-10-CM

## 2025-02-25 RX ORDER — DULOXETIN HYDROCHLORIDE 30 MG/1
60 CAPSULE, DELAYED RELEASE ORAL DAILY
Qty: 180 CAPSULE | Refills: 3 | Status: SHIPPED | OUTPATIENT
Start: 2025-02-25 | End: 2026-02-25

## 2025-02-25 RX ORDER — GALCANEZUMAB 120 MG/ML
INJECTION, SOLUTION SUBCUTANEOUS
Qty: 1 ML | Refills: 11 | Status: SHIPPED | OUTPATIENT
Start: 2025-02-25 | End: 2026-02-25

## 2025-02-26 ENCOUNTER — PHARMACY VISIT (OUTPATIENT)
Dept: PHARMACY | Facility: MEDICAL CENTER | Age: 45
End: 2025-02-26
Payer: COMMERCIAL

## 2025-02-26 ENCOUNTER — TELEPHONE (OUTPATIENT)
Dept: PHARMACY | Facility: MEDICAL CENTER | Age: 45
End: 2025-02-26
Payer: COMMERCIAL

## 2025-02-26 NOTE — TELEPHONE ENCOUNTER
Received New Start PA request via [employer]OT  for (EMGALITY) 120 MG/ML Solution Auto-injector . (Quantity:1, Day Supply:30)     Insurance: BCBS  Member ID:  [id number]  BIN: [id number]  PCN: [id number]  Group: [id number]     Ran Test claim via [place] & medication Rejects stating prior authorization is required.

## 2025-02-26 NOTE — TELEPHONE ENCOUNTER
Prior Authorization for     (EMGALITY) 120 MG/ML Solution Auto-injector     (Quantity: 1, Days: 30) has been submitted via Cover My Meds: Key ([id number])    Insurance: BCBS    Will follow up in 24-48 business hours.

## 2025-03-06 DIAGNOSIS — G43.709 CHRONIC MIGRAINE WITHOUT AURA WITHOUT STATUS MIGRAINOSUS, NOT INTRACTABLE: ICD-10-CM

## 2025-03-07 ENCOUNTER — DOCUMENTATION (OUTPATIENT)
Dept: NEUROLOGY | Facility: MEDICAL CENTER | Age: 45
End: 2025-03-07
Payer: COMMERCIAL

## 2025-03-07 ENCOUNTER — TELEPHONE (OUTPATIENT)
Dept: PHARMACY | Facility: MEDICAL CENTER | Age: 45
End: 2025-03-07
Payer: COMMERCIAL

## 2025-03-07 DIAGNOSIS — G43.709 CHRONIC MIGRAINE W/O AURA W/O STATUS MIGRAINOSUS, NOT INTRACTABLE: ICD-10-CM

## 2025-03-07 RX ORDER — UBROGEPANT 50 MG/1
TABLET ORAL
Qty: 10 TABLET | Refills: 12 | Status: SHIPPED | OUTPATIENT
Start: 2025-03-07

## 2025-03-07 NOTE — TELEPHONE ENCOUNTER
Received New Start  request via MSOT  for Ubrogepant (UBRELVY) 50 MG Tab . (Quantity:10, Day Supply:30)     Insurance: [facility]  Member ID:  [id number]  BIN: [id number]  PCN: [id number]  Group: [id number]     Ran Test claim via [place] & medication Pays for a $0 copay. Will outreach to patient to offer specialty pharmacy services and or release to preferred pharmacy    Will release to pt's preferred pharmacy on file. Pt has already declined services.

## 2025-03-17 ENCOUNTER — TELEPHONE (OUTPATIENT)
Dept: PHARMACY | Facility: MEDICAL CENTER | Age: 45
End: 2025-03-17
Payer: COMMERCIAL

## 2025-03-17 RX ORDER — GALCANEZUMAB 120 MG/ML
INJECTION, SOLUTION SUBCUTANEOUS
Qty: 1 ML | Refills: 11 | Status: SHIPPED | OUTPATIENT
Start: 2025-03-17 | End: 2026-03-17

## 2025-03-17 NOTE — TELEPHONE ENCOUNTER
Received New Start  request via MSOT  for (EMGALITY) 120 MG/ML Solution Auto-injector. (Quantity:1, Day Supply:30)     Insurance: [facility]   Member ID:  [id number]  BIN: [id number]  PCN: [id number]  Group:[id number]     Ran Test claim via [place] & medication Pays for a $35 copay. Will outreach to patient to offer specialty pharmacy services and or release to preferred pharmacy    Pt has already declined services. LVM where the patient wants this medication delivered to.     Pt called back and would like the medication to be sent to the [facility].

## 2025-03-18 ENCOUNTER — APPOINTMENT (OUTPATIENT)
Dept: NEUROLOGY | Facility: MEDICAL CENTER | Age: 45
End: 2025-03-18
Attending: PSYCHIATRY & NEUROLOGY
Payer: COMMERCIAL

## 2025-03-19 ENCOUNTER — PATIENT MESSAGE (OUTPATIENT)
Dept: NEUROLOGY | Facility: MEDICAL CENTER | Age: 45
End: 2025-03-19
Payer: COMMERCIAL

## 2025-03-19 DIAGNOSIS — Z30.41 ENCOUNTER FOR SURVEILLANCE OF CONTRACEPTIVE PILLS: ICD-10-CM

## 2025-03-19 RX ORDER — NORETHINDRONE ACETATE AND ETHINYL ESTRADIOL, ETHINYL ESTRADIOL AND FERROUS FUMARATE 1MG-10(24)
1 KIT ORAL DAILY
Qty: 84 TABLET | Refills: 3 | Status: SHIPPED | OUTPATIENT
Start: 2025-03-19

## 2025-03-20 ENCOUNTER — PATIENT MESSAGE (OUTPATIENT)
Dept: NEUROLOGY | Facility: MEDICAL CENTER | Age: 45
End: 2025-03-20

## 2025-03-31 ENCOUNTER — OFFICE VISIT (OUTPATIENT)
Dept: NEUROLOGY | Facility: MEDICAL CENTER | Age: 45
End: 2025-03-31
Attending: PSYCHIATRY & NEUROLOGY
Payer: COMMERCIAL

## 2025-03-31 VITALS
BODY MASS INDEX: 24.06 KG/M2 | SYSTOLIC BLOOD PRESSURE: 116 MMHG | HEART RATE: 83 BPM | TEMPERATURE: 98.3 F | WEIGHT: 144.4 LBS | DIASTOLIC BLOOD PRESSURE: 74 MMHG | HEIGHT: 65 IN | OXYGEN SATURATION: 100 % | RESPIRATION RATE: 16 BRPM

## 2025-03-31 DIAGNOSIS — G43.901 STATUS MIGRAINOSUS: ICD-10-CM

## 2025-03-31 DIAGNOSIS — G43.709 CHRONIC MIGRAINE W/O AURA W/O STATUS MIGRAINOSUS, NOT INTRACTABLE: ICD-10-CM

## 2025-03-31 PROCEDURE — 99212 OFFICE O/P EST SF 10 MIN: CPT | Performed by: PSYCHIATRY & NEUROLOGY

## 2025-03-31 RX ORDER — GABAPENTIN 300 MG/1
300 CAPSULE ORAL 3 TIMES DAILY
Qty: 90 CAPSULE | Refills: 11 | Status: SHIPPED | OUTPATIENT
Start: 2025-03-31 | End: 2026-03-31

## 2025-03-31 ASSESSMENT — PATIENT HEALTH QUESTIONNAIRE - PHQ9: CLINICAL INTERPRETATION OF PHQ2 SCORE: 0

## 2025-03-31 NOTE — PROGRESS NOTES
"[facility] NEUROLOGY  GENERAL NEUROLOGY  FOLLOW-UP VISIT    CC: chronic migraine w/o aura    INTERVAL HISTORY:  Estelita Gates is a 44 y.o. woman with chronic migraine without aura and a history otherwise notable for asthma.  I last saw her in the clinic on 1/23/2025 at the time of Botox administration.  At that time I recommended she continue Botox.  I also recommended she continue Ubrelvy.  Today, she was unaccompanied, and she provided the following interval history:    The following is a summary of headache symptoms, presented in my standard format:     Family History: none  Age at onset (years): 38  Location: bi-temporal, bi-frontal, nose  Radiation: none  Frequency: baseline: daily, lately: 3-4/week  Duration: baseline: a couple of hours, lately: ~1 hour  Headache Days/Month: baseline: 30/30, lately: 12-16/30  Quality: baseline: \"throbbing,\" lately: \"pressure,\" like someone has a vice on her nose  Intensity: baseline: 7-8/10, lately: 4-7/10  Aura: none  Photophobia/Phonophobia/Nausea/Vomiting: yes/yes/yes/no  Provoked by Physical Activity?: not during exercise, headache often come on after exercise has just been completed  Triggers: caffeine, sleep deprivation, dehydration, summer heat, alcohol  Associated Symptoms: none  Autonomic Signs (such as ptosis, miosis, conjunctival injection, rhinorrhea, increased lacrimation): none  Head Trauma: none  Association with Menses: none  ED Visits: none  Hospitalizations: none  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  Sleep (hours/night): 6  Caffeine Intake: 1/day  Hydration: tries, probably doesn't drink enough water  Nutrition: regular meals  Exercise:   Analgesic Overuse:      Current Medication Regimen:  - Botox: helpful  - CoQ 10: helpful  - duloxetine: helpful, no side effects  - Emgality: helpful  - ondansetron: doesn't use this often, no side effects  - riboflavin: helpful  - Ubrelvy: is effective but associated with intolerable fatigue  - " Zavzpret: used this once, helpful, but burned    Medications Tried: Response  Preventive:  - Ajovy: worked well x3 months but then lost effectiveness  - Aimovig: ineffective  - amitriptyline: associated with excessive fatigue  - nortriptyline: effective, associated with fatigue (doses at night) and dry mouth   - Qulipta: ineffective  - topiramate: associate with nausea, malaise, fatigue, and hot flashes     Rescue:  - dexamethasone taper: helpful, but then headaches returned  - sumatriptan: 100 mg is effective but associated with intolerable fatigue, doesn't take this often  - rizatriptan: not particularly effective, no negative side effects  - naratriptan: effective, fewer side effects than sumatriptan  - Nurtec: worked for a while, then lost effectiveness    Medications Not Tried:  -     MEDICATIONS:  Current Outpatient Medications   Medication Sig    gabapentin (NEURONTIN) 300 MG Cap Take 1 Capsule by mouth 3 times a day.    LO LOESTRIN FE 1 MG-10 MCG / 10 MCG Tab TAKE 1 TABLET BY MOUTH DAILY    Galcanezumab-gnlm (EMGALITY) 120 MG/ML Solution Auto-injector Loading dose: 2 pens (240 mg) followed by 1 pen (120 mg) every month.    celecoxib (CELEBREX) 200 MG Cap Take 1 Capsule by mouth 2 times a day.    Ubrogepant (UBRELVY) 50 MG Tab TAKE 1-2 TABLETS BY MOUTH AS NEEDED FOR MIGRAINE. MAY REPEAT AFTER 2 HOURS AS NEEDED. NO MORE THAN 200 MG/24 HOURS    Ubrogepant 50 MG Tab TAKE 1-2 TABLETS BY MOUTH AS NEEDED FOR MIGRAINE. MAY REPEAT AFTER 2 HOURS AS NEEDED. NO MORE THAN 200 MG/24 HOURS    DULoxetine (CYMBALTA) 30 MG Cap DR Particles Take 2 Capsules by mouth every day.    Zavegepant HCl (ZAVZPRET) 10 MG/ACT Solution One spray (10 mg/spray) in 1 nostril as a single dose. Maximum: One spray (10 mg) per 24 hours    meloxicam (MOBIC) 15 MG tablet TAKE 1 TABLET BY MOUTH ONCE  DAILY    VIBERZI 100 MG Tab     colestipol (COLESTID) 1 GM Tab     celecoxib (CELEBREX) 200 MG Cap Take 1 Capsule by mouth every day.    magnesium  oxide (MAG-OX) 400 MG Tab tablet Take 400 mg by mouth every day.    Riboflavin 400 MG Cap Take  by mouth every day.    B Complex-C (SUPER B COMPLEX PO) Take  by mouth every day.    Cetirizine HCl (ZYRTEC ALLERGY PO) Take 1 Tablet by mouth every day.     MEDICAL, SOCIAL, AND FAMILY HISTORY:  There is no change in the patient's ROS or medical, social, or family histories since the previous visit on 1/23/2025.    REVIEW OF SYSTEMS:  A ROS was completed.  Pertinent positives and negatives were included in the HPI, above.  All other systems were reviewed and are negative.    PHYSICAL EXAM:  General/Medical:  - NAD    Neuro:  MENTAL STATUS: awake and alert; no deficits of speech or language; oriented to conversation; affect was appropriate to situation; pleasant, cooperative    CRANIAL NERVES:    II: acuity: NT, fields: NT, pupils: NT, discs: sharp    III/IV/VI: versions: grossly intact    V: facial sensation: NT    VII: facial expression: symmetric    VIII: hearing: intact to voice    IX/X: palate: NT    XI: shoulder shrug: NT    XII: tongue: NT    MOTOR:  - bulk: NT  - tone: NT  Upper Extremity Strength (R/L)    NT   Elbow flexion NT   Elbow extension NT   Shoulder abduction NT     Lower Extremity Strength  (R/L)   Hip flexion NT   Knee extension NT   Knee flexion NT   Ankle dorsiflexion NT   Ankle plantarflexion NT     - heel-walk: NT  - toe-walk: NT  - pronator drift: absent  - abnormal movements: none    SENSATION:  - light touch: NT  - vibration (R/L, seconds): NT at the great toes  - pinprick: NT  - proprioception: NT  - Romberg: NT    COORDINATION:  - finger to nose: NT  - finger tapping: NT    REFLEXES:  Reflex Right Left   BR NT NT   Biceps NT NT   Triceps NT NT   Patellae NT NT   Achilles NT NT   Toes NT NT     GAIT:  - NT    REVIEW OF IMAGING STUDIES:  No additional data since the last visit.    REVIEW OF LABORATORY STUDIES:  No additional data since the last visit.    ASSESSMENT:  Estelita Gates is  "a 44 y.o. woman with chronic migraine w/o aura and a history otherwise notable for asthma.  Her headaches remain surprisingly difficult to control.  Plans/recommendations as follows:    PLAN:  Chronic Migraine w/o Aura:  Prevention:  - continue Botox  - continue Emgality  - continue duloxetine  - will try adding gabapentin, per patient request  - continue supplementing:  - magnesium: 400-600 mg once or 200-300 mg twice daily  - riboflavin (vitamin B2): 400 mg once daily  - coenzyme Q10: 300 mg daily  - get 7-9 hours of sleep per night; can try supplementing melatonin 2-10 mg, 2-3 hours before bedtime  - drink plenty of fluids (urine should be nearly clear)  - avoid excessive caffeine intake (no more than 2 servings per day and nothing in the afternoon)  - eat regular meals (don't skip meals)  - get moderate exercise (even just a 20 minute walk daily)    Rescue:  - continue Ubrelvy PRN  - continue Zavzpret PRN  - continue ondansetron 4 mg: take this at the onset of aura or headache to prevent or reduce nausea; may re-dose after 2 hours if headache or nausea persist; do not use more often than 3 days/week  - do not use analgesics (e.g., ibuprofen, acetaminophen) more than 2 days per week in order to avoid analgesic rebound headaches    - keep a headache log    - MRI brain (given unusual presentation)    Follow-Up:  - Return in about 3 months (around 6/30/2025).    Signed: Willis Jones M.D.    BILLING DOCUMENTATION:   I spent 32 minutes reviewing the medical record, interviewing and examining the patient, discussing my impression (see \"assessment\" above), and coordinating care.    "

## 2025-04-16 NOTE — PROGRESS NOTES
Urogynecology and Pelvic Reconstructive Surgery  Follow Up    Estelita Gates MRN:[id number] :1980    Referred by: Pavithra Hadley MD    Reason for Visit:   Chief Complaint   Patient presents with    Follow-Up     Discuss surgical options          Subjective     History of Presenting Illness:    Estelita Gates is a 44 y.o. year old P2 with prolapse who presents for follow-up    She was initially booked for a robotic hysteropexy with repairs, but deferred this due to obtaining her  and opted for pessary fitting in 2023, which she used during her training but is not a good long term solution..  Options were discussed again in 2024. Prolapse has somewhat worsened, worse with running causing bowel urgency. She has wanted to observe, but now is very frustrated and wants to move forward.     She is very ready to move forward with surgery, but does have questions and concerns about her recovery and need to limit her normal activities.    Initial HPI: She was referred by Dr. Piña (PCP) for the evaluation and management of rectocele. She is most bothered by vaginal bulge and occasional fecal incontinence.  She is very active, and training for a , and is noted while training/running a bulge which is very irritating.  She has seen her GYN previously but has not undergone any treatments.   She is also seeing Dr. Jenna Martin (colorectal) for bleeding internal hemorrhoids, and she was referred to GI for treatment of these hemorrhoids, scheduled for banding on .  Her external hemorrhoids are minimally bothersome. She strongly interested in definitive surgical management of her prolapse and potentially stool incontinence, however this will have to be deferred until after her back felt trial in the fall. She is currently done with childbearing, on lo Loestrin for birth control and bleeding management.    Prior Pelvic surgery:   None     Prior treatment:   Pessary (#3  ring - too small, #4 ring)     Fluid intake:   6-8 cups wter  1-2 cups coffee    Pelvic floor symptom review:     Bladder:   Voids per day: 3-6 Voids per night: 0-1      Urinary incontinence episodes per day: occasional stress   Bladder emptying: Complete   Voiding symptoms: None   UTI in last 12 months: No   Other urologic history: None      Prolapse:     Prolapse symptoms: Bulge, Exteriorized Tissue, and Pelvic Pressure   Degree of prolapse: To Introitus     Bowel:    Constipation:IBS-D,  has outlet constipation. Avoids caffeine. She is sensitive with fiber. Uses imodie   Bowel movements per day: 2    Straining to empty bowels: No    Splinting to evacuate: Yes   Painful evacuation: No    Difficulty emptying rectum: No    Incontinence to stool: yes, not too often with diet/caffeine management. Once every few months, staining   Blood in stool: Yes - hemorrhoids   Hemorrhoids: Yes   Bowel conditions: IBS      Sexual function:    Sexually active: Yes   Gender of partners: Male   Pain with intercourse: No        Past medical and surgical history    Past obstetric history   Number of vaginal deliveries: 2   Number of  deliveries: 0   History of vacuum/forceps: No    History of obstetric anal sphincter injury: Yes    Past gynecological history:    Last menstrual period: No LMP recorded. (Menstrual status: Irregular Menses).   History of abnormal uterine bleeding: No    History of fibroids: No    History of endometrial polyps:  No    History of endometriosis: No    History of cervical dysplasia: No    Last pap:    Current contraception: loloestrin      Past medical history:  Past Medical History:   Diagnosis Date    Pain 2023    right wrist    Acute nasopharyngitis 2022    3 weeks ago     Past surgical history:  Past Surgical History:   Procedure Laterality Date    GANGLION EXCISION Right 3/27/2023    Procedure: RIGHT WRIST GANGLION CYST EXCISION;  Surgeon: Estelita Francois M.D.;   Location: SURGERY SAME DAY [facility];  Service: Orthopedics    PB KNEE SCOPE,DIAGNOSTIC Right 10/26/2022    Procedure: Knee arthroscopy with chondroplasty, limited synovectomy, open cyst excision with hardware removal and bone grafting, surgeries as indicated;  Surgeon: Ventura Dela Cruz M.D.;  Location: [facility];  Service: Orthopedics    PB EXCIS TENDON SHEATH LESION, HAND/FINGER Right 10/26/2022    Procedure: EXCISION, CYST;  Surgeon: Ventura Dela Cruz M.D.;  Location: [facility];  Service: Orthopedics    CHONDROPLASTY Right 10/26/2022    Procedure: CHONDROPLASTY;  Surgeon: Ventura Dela Cruz M.D.;  Location: SURGERY [facility];  Service: Orthopedics    SYNOVECTOMY Right 10/26/2022    Procedure: SYNOVECTOMY;  Surgeon: Ventura Dela Cruz M.D.;  Location: [facility];  Service: Orthopedics    HARDWARE REMOVAL ORTHO Right 10/26/2022    Procedure: REMOVAL, HARDWARE;  Surgeon: Ventura Dela Cruz M.D.;  Location: [facility];  Service: Orthopedics    BONE GRAFT Right 10/26/2022    Procedure: BONE GRAFT;  Surgeon: Ventura Dela Cruz M.D.;  Location: [facility];  Service: Orthopedics    PB EXCIS TENDON SHEATH LESION, HAND/FINGER Right 4/11/2022    Procedure: EXCISION OF GANGLION CYST RIGHT WRIST;  Surgeon: Estelita Francois M.D.;  Location: SURGERY SAME DAY [facility];  Service: Orthopedics    OTHER ORTHOPEDIC SURGERY Right 03/2020    ACL    OTHER ORTHOPEDIC SURGERY Left 04/2017    ACL     Medications:has a current medication list which includes the following prescription(s): celecoxib, gabapentin, lo loestrin fe, emgality, ubrelvy, ubrogepant, duloxetine, zavzpret, meloxicam, viberzi, colestipol, celecoxib, magnesium oxide, riboflavin, b complex-c, and cetirizine hcl, and the following Facility-Administered Medications: onabotulinum toxin a.  Allergies:Cefaclor and Penicillins  Family history:No family history on file.  Social history: reports that she has never smoked. She has  never been exposed to tobacco smoke. She has never used smokeless tobacco. She reports current alcohol use of about 1.8 oz of alcohol per week. She reports that she does not use drugs.    Review of systems: A full review of systems was performed, and negative with the exception of want is noted above in the HPI.        Objective        /85 (BP Location: Left arm, Patient Position: Sitting, BP Cuff Size: Adult)   Pulse 85   Wt 143 lb   BMI 23.80 kg/m²     Physical Exam  Vitals reviewed. Exam conducted with a chaperone present (MA - see notes.).   Constitutional:       Appearance: Normal appearance.   HENT:      Head: Normocephalic.      Mouth/Throat:      Mouth: Mucous membranes are moist.   Cardiovascular:      Rate and Rhythm: Normal rate.   Pulmonary:      Effort: Pulmonary effort is normal.   Abdominal:      Palpations: Abdomen is soft. There is no mass.      Tenderness: There is no abdominal tenderness.   Skin:     General: Skin is warm and dry.   Neurological:      Mental Status: She is alert.   Psychiatric:         Mood and Affect: Mood normal.         Genitourinary:    External female genitalia: WNL   Vulva: WNL   Bulbocavernosus reflex: Intact   Anal wink reflex: Intact   Perineal sensation: WNL   Urethra: Hypermobile   Vagina: WNL   Atrophy: None   Cough stress test: Negative    Pelvic floor:    POP-Q: Prior Aa 0 / Ba 0 / TVL 10 / C -4 / D -6 / Ap -1 / Bp -1 / GH 4 / PB 2   Palpable rectocele and perineocele   Prolapse stage: 2   Paravaginal defect: mild   Cervical elongation: No    Urethral tenderness: No    Bladder/ suprapubic tenderness: No    Levator tenderness: None   Levator muscle tone: Hypertonic   Pelvic floor contraction strength (modified Oxford scale): 3=Moderate   Pelvic floor contraction duration: Normal   Bimanual exam: Small, Mobile Uterus   Anal resting tone: WNL   Anal squeeze tone: WNL   Palpable anal sphincter defect: No    Granulation tissue: No    Epithelial erosions: No     Epithelial ulcerations: No    Fistula: None   Vaginal band/stricture: No     Procedure Performed: No    Diagnostic test and records review:       Post-void residual: 12 mL, performed by Bladder Scanner    Radiology: n/a    Documentation reviewed: Prior EMR Records           Assessment & Plan     Estelita Gates is a 44 y.o. year old P2 with symptomatic stage II uterovaginal prolapse, outlet dysfunction constipation, fecal staining.     Incomplete uterovaginal prolapse   Cystocele, midline   Rectocele   Outlet dysfunction constipation   Perineocele  - We repeated surgical counseling, specifically focusing on native tissue versus mesh augmented options, for her, I would recommend either a native tissue hysteropexy with repairs, performed laparoscopically, or hysterectomy with sacrocolpopexy.  The benefits and drawbacks of each treatment were reviewed with her including the risks and benefits of surgical mesh.  Since she is very active and values durable treatment, she opts move forward with scheduling: Robotic assisted laparoscopic supracervical hysterectomy, bilateral salpingectomy, sacrocolpopexy, posterior repair/perineorrhaphy, possible incontinence procedure, and all indicated procedures.  - She is not currently bothered by urinary incontinence, but I discussed the risk of occult MITCHELL after prolapse reduction, and would recommend a preoperative cystometrogram to evaluate for this at her preop visit.  - Print material again provided for her review prior to preop visit.    H/o Bartholin cyst  No addressed today. She has had a subjective feeling of Bartholin cyst which resolved on its own, cyst in the lower aspect of the vulva.  I counseled her to monitor the symptoms and she has an acute inflammation which is painful to call and at her cell phone for an evaluation appointment.  First time treatment can include a drainage in the office, but if these recur long-term she may require surgical excision or  marsupialization.    Fecal staining  She is now s/p hemorrhoid banding, but has persistent issues.  Reviewed again, and she understands that with her prolapse repair and repair of the perineum she likely has a chance of improving the symptoms due to improved rectal emptying.    Internal hemorrhoids  Sp banding 6/2023             Pranav Mohan MD, FACOG  Urogynecology and Pelvic Reconstructive Surgery  Department of Obstetrics and Gynecology  [facility]        This medical record contains text that has been entered with the assistance of computer voice recognition and dictation software.  Therefore, it may contain unintended errors in text, spelling, punctuation, or grammar

## 2025-04-17 ENCOUNTER — OFFICE VISIT (OUTPATIENT)
Dept: NEUROLOGY | Facility: MEDICAL CENTER | Age: 45
End: 2025-04-17
Attending: PSYCHIATRY & NEUROLOGY
Payer: COMMERCIAL

## 2025-04-17 ENCOUNTER — APPOINTMENT (OUTPATIENT)
Dept: GYNECOLOGY | Facility: CLINIC | Age: 45
End: 2025-04-17
Payer: COMMERCIAL

## 2025-04-17 VITALS
RESPIRATION RATE: 14 BRPM | SYSTOLIC BLOOD PRESSURE: 118 MMHG | DIASTOLIC BLOOD PRESSURE: 74 MMHG | OXYGEN SATURATION: 97 % | HEIGHT: 65 IN | WEIGHT: 143.74 LBS | BODY MASS INDEX: 23.95 KG/M2 | TEMPERATURE: 98.2 F | HEART RATE: 78 BPM

## 2025-04-17 VITALS
DIASTOLIC BLOOD PRESSURE: 85 MMHG | SYSTOLIC BLOOD PRESSURE: 126 MMHG | WEIGHT: 143 LBS | BODY MASS INDEX: 23.8 KG/M2 | HEART RATE: 85 BPM

## 2025-04-17 DIAGNOSIS — N81.6 RECTOCELE: ICD-10-CM

## 2025-04-17 DIAGNOSIS — N81.12 CYSTOCELE, LATERAL: ICD-10-CM

## 2025-04-17 DIAGNOSIS — N81.81 PERINEOCELE: ICD-10-CM

## 2025-04-17 DIAGNOSIS — K59.02 OUTLET DYSFUNCTION CONSTIPATION: ICD-10-CM

## 2025-04-17 DIAGNOSIS — N81.2 INCOMPLETE UTEROVAGINAL PROLAPSE: ICD-10-CM

## 2025-04-17 DIAGNOSIS — G43.709 CHRONIC MIGRAINE WITHOUT AURA WITHOUT STATUS MIGRAINOSUS, NOT INTRACTABLE: ICD-10-CM

## 2025-04-17 PROCEDURE — 3079F DIAST BP 80-89 MM HG: CPT | Performed by: STUDENT IN AN ORGANIZED HEALTH CARE EDUCATION/TRAINING PROGRAM

## 2025-04-17 PROCEDURE — 3074F SYST BP LT 130 MM HG: CPT | Performed by: PSYCHIATRY & NEUROLOGY

## 2025-04-17 PROCEDURE — 64615 CHEMODENERV MUSC MIGRAINE: CPT | Performed by: PSYCHIATRY & NEUROLOGY

## 2025-04-17 PROCEDURE — 3074F SYST BP LT 130 MM HG: CPT | Performed by: STUDENT IN AN ORGANIZED HEALTH CARE EDUCATION/TRAINING PROGRAM

## 2025-04-17 PROCEDURE — 700111 HCHG RX REV CODE 636 W/ 250 OVERRIDE (IP)

## 2025-04-17 PROCEDURE — 700101 HCHG RX REV CODE 250

## 2025-04-17 PROCEDURE — 99214 OFFICE O/P EST MOD 30 MIN: CPT | Performed by: STUDENT IN AN ORGANIZED HEALTH CARE EDUCATION/TRAINING PROGRAM

## 2025-04-17 PROCEDURE — 3078F DIAST BP <80 MM HG: CPT | Performed by: PSYCHIATRY & NEUROLOGY

## 2025-04-17 RX ADMIN — SODIUM CHLORIDE 155 UNITS: 9 INJECTION, SOLUTION INTRAMUSCULAR; INTRAVENOUS; SUBCUTANEOUS at 09:58

## 2025-04-17 ASSESSMENT — PATIENT HEALTH QUESTIONNAIRE - PHQ9: CLINICAL INTERPRETATION OF PHQ2 SCORE: 0

## 2025-04-17 NOTE — PATIENT INSTRUCTIONS
Pre-op: What you should know before your surgery  Laparoscopic/robotic reconstructive surgery for prolapse  (mesh-augmented)      What you should know before your surgery  You are scheduled for surgery to fix your prolapse (vaginal bulge) using sutures and lightweight mesh to suspend pelvic structures back into a supported position. These surgeries are minimally-invasive, using only small “keyhole” cuts on the abdomen and in the vagina. The documents in this packet will outline each part of the surgery. There may be a few “possible” surgeries listed. We use the word “possible” because we tailor the surgery based on your needs. This means we won't know how much surgery you'll need until after you receive anesthesia and fall asleep.     When you fall asleep, the pelvic muscles will relax, allowing us to determine how much surgery you need. In general, we will do as few procedures as possible to fix your prolapse but address each area as needed.     Goals of prolapse surgery: The primary goal of surgery is to repair the prolapse and eliminate the symptoms of a vaginal bulge and pressure. Depending on your symptoms, the surgery may possibly improve bladder emptying and/or rectal emptying, as well as urinary incontinence.      Prolapse recurrence:  Your procedure will utilize a permanent implanted mesh material to enhance the support of your pelvic structures, and reduce the likelihood of your prolapse returning. This will be described in more detail below in the details of your surgery. However, even with this mesh, there is still a long-term risk of prolapse returning (30% of women), and needing an additional surgery (<10% of women).     Sexual function:  Following surgical repair, most patients experience improvements in their sexual function. Surgery tends to improve the general discomfort of sex associated with prolapse. However, if you have a long history of pain with sex (either externally or internally), this is  unlikely to be due to prolapse. Therefore, surgery may not improve these symptoms.     Surgery involves the cutting and re-sewing of the vaginal tissues. Scar tissue may form after surgery, which can lead to painful sex for some patients. In many patients, this new pain goes away over time or can be improved with pelvic physical therapy, lubrication, dilators, or vaginal estrogen.     Bladder urgency and incontinence after surgery:  Bladder tests may be performed before your surgery to see whether you are at risk for new or worsening urinary leakage after prolapse repair. Our bladder testing is a great tool to find out who is at risk for urinary leakage, but it is not perfect. There is a chance you may have new or increased leakage with coughing, sneezing, or laughing after surgery. Problems with leakage can be addressed in a number of ways. Bladder urgency and/or frequency often improves after surgery, but it may worsen in some patients. We have various medications and therapies that can help with this urgency after surgery, if necessary.    Risk of postoperative pain:   Pain after prolapse surgery is a normal part of the healing process, but usually well-tolerated. Common areas of pain include the pelvis, buttocks, hips and back, often related to positioning.   Try changing your position when sitting or resting in bed to relieve the pain    Expect to have some pain when you are discharged home. This should improve with time and with use of medications. See “after surgery” instructions for more details on pain control.      General risks of pelvic reconstructive surgery: Specific risks for your procedure are discussed separately  Anesthesia: With modern anesthetics and monitoring equipment, complications due to anesthesia are very rare. Your anesthesiologist will discuss what will be most suitable for you on the day of surgery  Bleeding: All surgery results in bleeding, however this surgery usually amounts to blood  loss between a tablespoon and a teacup. Large amounts of blood loss that requires a blood transfusion are not common (only 1-2% of women) in prolapse surgery.  Blood transfusion, if needed, is safe. Minor reactions like fever or allergy occur in less than 1% of transfusions. Riskan infection or mismatched blood is very rare (less than 1 out of every 100,000 transfusions).   Infection: The most common infection with prolapse surgery is a urinary tract infection (UTI). This is easily treated. Wound infections occur in 2-3% of patients. Rarely, infection of the abdominal/vaginal wounds may occur, or abscesses in the pelvis may develop. These infections may need to be treated with antibiotics or another procedure to fix them. Risk factors for infections and wound complications include diabetes, smoking, obesity, and other chronic illnesses.  Blood Clots: Clots in the blood vessels of the legs and lungs are potentially dangerous and can occur in patients undergoing prolapse surgery. This is prevented with leg compression during surgery, and sometimes, an injected blood thinner. We strongly encourage you to stay mobile because this is an important way to prevent clots.  Damage to surrounding organs. The pelvic organs are all very close together. The risk of damage to other organs increases if you have had prior pelvic surgeries, as well as a history of endometriosis or pelvic infection. These conditions can cause scar tissue to develop in the pelvis. Scar tissue can cause organs to stick together, making the surgery more difficult.    Ureter and bladder damage: The ureters are tubes that carry urine from the kidneys to the bladder. They travel very close to the uterus and vagina. The bladder is attached to both your uterus and the front of the vagina. Damage/injury can happen during prolapse-repair procedures. A cystoscopy (camera in the bladder) will be done during your surgery to ensure there is no bladder or ureter  damage/injury. If injury occurs, it may require temporary placement of a stent (drainage tube). In rare cases, a larger abdominal incision may be needed to repair the injury. A bladder catheter may need to stay in place temporarily after surgery.  Bowel damage: Bowel damage/injury is uncommon during your surgery. Any damage that is seen in surgery will be fixed. Very rarely, a temporary ostomy (connection of bowel to the front of the abdomen and collection of stool with a bag) is required for a higher-risk bowel injury.  Vascular damage: Major damage to blood vessels is uncommon. If this occurs, it may require a larger surgery and/or blood transfusion.  Fistula: A fistula is an abnormal connection between the vagina and either the bladder or rectum. A fistula leads to leakage of urine or stool. These are rare complications that arise during healing from pelvic surgery that sometimes require additional surgeries to correct. We take great care is during your surgery to prevent these fistulas. If they do occur, your Urogynecologist is the leading expert in fixing them.    Main Procedure:    Laparoscopic/robotic supra-cervical hysterectomy and sacrocervicopexy   (removal of uterus and suspension of cervix to backbone with mesh)       Once you are asleep, small “keyhole” incisions (smaller than ½ inch) will be made in the abdomen in order to place our instruments. Your surgeon may use a robotic system to help complete the surgery through the small incisions. The top of the uterus and your fallopian tubes will then be removed, leaving the lower portion of the uterus (cervix) in place. This is called a hysterectomy and salpingectomy. If you have decided to also remove the ovaries (oophorectomy), this will also be performed. Then, a lightweight permanent mesh will be sewn onto the cervix and the front and back surfaces of the vagina. This will lift up the prolapse and attach to a strong band of tissue on your sacrum (lower  back bone). We will then look into your bladder with a camera to make sure that no damage was done, and that your ureters (the tubes that carry urine from the kidneys to the bladder) are working. Sometimes we are unable to perform the mesh suspension safely, and may need to adjust to a different approach using other structures around the vagina.    The mesh we use is a lightweight permanent material. This material is stronger than your own tissues, which means there is less chance of the prolapse coming back again. Please note that the abdominal mesh for this procedure is not included in the recent controversies involving vaginal meshes.     Everybody's body responds to permanent material differently. 2-5% of women who undergo this surgery may note pain with sex, or notice the mesh exposed through the vagina in the future.  This is often easily treated with medication or a small procedure. In rarer instances the mesh can erode into surrounding structures, or lead to pain or recurrent infections. Sometimes the mesh may have to be removed, which would entail additional surgery.  Since we are not removing your cervix, you will need to continue with routine Pap screening for cervical cancer with your gynecologist.        After this procedure is complete, you will be re-examined and then proceed with the following possible procedures:  Main surgical procedure:  Posterior repair, perineorrhaphy  (fix prolapse of the back of the vagina/rectum and pelvic muscles)        The entire procedure will be completed in a minimally invasive manner, with all incisions inside of the vagina. Once you are asleep, a thorough exam will be performed to confirm the areas needing repair. A cut is made along the back wall of the vagina where the rectum is pushing down, and the skin above the rectum is  from the underlying supportive tissue. This stronger tissue underneath is then repaired using sutures that will dissolve over time.  "Sometimes excess skin is removed. The skin is then closed.     If the area between the vagina and the anus (called the perineum) is weak, then sutures will be placed to make that area stronger. This is called a \"perineorrhaphy.\" This will be just like a repair of an episiotomy or a vaginal tear after having a baby.     The risk of these procedures is similar to those mentioned in the “pre-op” leaflet. However, any surgery to the back wall of the vagina carries a higher risk of pain with sexual intercourse after surgery (up to 10%) due to scar formation. Great care is taken not to narrow the vagina more than necessary. The perineorrhaphy (or episiotomy type of repair) can be uncomfortable and may be difficult to sit normally for up to 6 weeks after surgery. You may use a doughnut cushion to sit on if needed.        Post-op: What to expect after your surgery    Recovery room  You can expect to stay in the recovery room for a few hours until you are alert. There may be a gauze packing in your vagina, which will be removed before you go home. Pain is normal after surgery but should be tolerable. Your pain will become less over the first 2 weeks. If your pain is difficult to control or you need more nursing care, you will stay in the hospital overnight. Most patients do very well going home on the day of surgery.     Bladder function  You will wake up with a small tube (catheter) in your bladder. A bladder test, called a “void trial”, will be performed by the nurse before you go home. The test is done to make sure you can empty your bladder normally. To do the bladder test, the nurse will fill your bladder with sterile water, remove the catheter, and give you 30 minutes to try and urinate (pee) on your own. If you cannot urinate, or if you only urinate a small amount, you will need to:   Go home with a catheter that stays in your bladder OR  Learn to put a catheter into your bladder a few times a day to empty it    Use " of a catheter is temporary and usually needed for 2-4 days. It is very common for the bladder to work slowly, or sluggishly, after this type of surgery. One in every 3-4 patients will require some type of catheterization. An antibiotic may be prescribed while the catheter is in place to decrease the risk of infection.    Call the surgeon for treatment, if you have signs and symptoms of a urinary tract infection, including:  Burning with urination  Bladder pain  Worsening need to urinate right away,  Urine with a bad smell    Vaginal care  Do not go swimming, take sitting baths, and have sexual intercourse for 6 weeks after surgery Do not place anything in your vagina except vaginal estrogen cream, if instructed to do so by your surgeon.   Vaginal discharge and bleeding/spotting is also normal through the entire 6-week recovery. Sometimes small sutures will fall out of the vagina as they dissolve. This is normal. Contact the office with any heavy bleeding, foul discharge or worsening pain.. Contact us with any heavy bleeding, foul discharge or worsening pain.     Pain management  Surgical pain is controlled in most patients with only acetaminophen (Tylenol) and non-steroidal anti-inflammatory drugs (NSAIDs), such as ibuprofen (Advil). These drugs can be taken together because they do not interact with each other. Check your prescription for specific dosing instructions. A cold compress can help with pain in the vaginal area.  Sometimes, a short course of narcotics, such as oxycodone or hydromorphone is required. We do not recommend using narcotics regularly as it can lead to constipation or dizziness and falls. Do not drive or operate heavy machinery while using narcotics. You are unlikely to become addicted if you need to take a narcotic medication a few times within the first week of your surgery.  After the first few days to one week, your pain should decrease and you should not have pain severe enough to need  narcotics. If you continue having severe pain, contact your surgeon for re-evaluation.     Abdominal wound care  The incisions on your abdomen will be closed with either small bandages or a surgical glue. There are also tiny dissolving sutures beneath the skin. You can shower with these in place. In shower, let the soapy warm water run over you incisions. Do not scrub or wipe you incisions. The bandages will fall off on their own, or you can remove them after at least 3 days if they become discolored or dirty. The glue will also fall off on its own after a few weeks. Small sutures that pop out through the skin are normal and will dissolve over time. Contact us if you feel increasing pain or warmth at the incision. Also, call if you see increased redness or discharge (pus) at the incision.      Bowel function  Constipation is common after surgery, and it sometimes take several days before having a normal bowel movement. It is important to use a bowel regimen to keep your stools soft and avoid straining with bowel movements, which may damage the prolapse repair before it has healed. Most patients will be given a prescription for a stool softener (docusate) as well as a gentle laxative (Miralax or lactulose), which adds water to the stool to make it easier to pass. Take these daily throughout your recovery, and hold for a day if you develop diarrhea. Please call us if you experience any repeated episodes of vomiting, worsening abdominal pain/bloating, or are unable to have a bowel movement for more than 3 days.     Activity restrictions  During the first 6 weeks you should avoid any type of heavy lifting.  Gentle walking is good exercise. Start with about 10 minutes a day when you feel ready and build up gradually. Avoid repetitive squatting or bending at the waist. Avoid any fitness-type training, aerobics, etc. for at least 6 weeks after surgery. Listen to your body during the recovery period, and increase activity  when you feel comfortable. Generally, you will need 4-6 weeks off work. This period may be longer if you have a very physical job.    Return to sex  When your surgeon clears you to resume sex (if desired), you should start out slowly and to use adequate lubrication. Your vagina and pelvis have been re-structured, and it can take time to get used to sex after surgery. Most scar tissue softens over time and discomfort improves. If discomfort does not go away, contact us to discuss to schedule an exam and to discuss further options. In some cases, your surgeon may prescribe a low dose of vaginal estrogen to help with vaginal dryness and pain that may happen with sex.

## 2025-05-06 ENCOUNTER — APPOINTMENT (OUTPATIENT)
Dept: NEUROLOGY | Facility: MEDICAL CENTER | Age: 45
End: 2025-05-06
Attending: PSYCHIATRY & NEUROLOGY
Payer: COMMERCIAL

## 2025-05-21 ENCOUNTER — TELEPHONE (OUTPATIENT)
Dept: PHARMACY | Facility: MEDICAL CENTER | Age: 45
End: 2025-05-21
Payer: COMMERCIAL

## 2025-05-21 DIAGNOSIS — G43.709 CHRONIC MIGRAINE WITHOUT AURA WITHOUT STATUS MIGRAINOSUS, NOT INTRACTABLE: Primary | ICD-10-CM

## 2025-05-21 RX ORDER — GABAPENTIN 300 MG/1
600 CAPSULE ORAL 3 TIMES DAILY
Qty: 540 CAPSULE | Refills: 3 | Status: SHIPPED | OUTPATIENT
Start: 2025-05-21 | End: 2026-05-16

## 2025-05-21 RX ORDER — GALCANEZUMAB 120 MG/ML
120 INJECTION, SOLUTION SUBCUTANEOUS
Qty: 1.12 ML | Refills: 11 | Status: SHIPPED | OUTPATIENT
Start: 2025-05-21 | End: 2026-05-21

## 2025-05-21 NOTE — TELEPHONE ENCOUNTER
Received New Start  request via [employer]  for (EMGALITY) 120 MG/ML Solution Auto-injector . (Quantity:1, Day Supply:30)     Insurance: [employer]  Member ID:  [id number]  BIN: [id number]  PCN: [id number]  Group: [id number]     Ran Test claim via [facility] [employer] [facility] - will release to pt's preferred pharmacy on file.

## 2025-05-27 ENCOUNTER — APPOINTMENT (OUTPATIENT)
Dept: ADMISSIONS | Facility: MEDICAL CENTER | Age: 45
End: 2025-05-27
Attending: STUDENT IN AN ORGANIZED HEALTH CARE EDUCATION/TRAINING PROGRAM
Payer: COMMERCIAL

## 2025-05-29 ENCOUNTER — PATIENT MESSAGE (OUTPATIENT)
Dept: NEUROLOGY | Facility: MEDICAL CENTER | Age: 45
End: 2025-05-29
Payer: COMMERCIAL

## 2025-05-30 ENCOUNTER — TELEPHONE (OUTPATIENT)
Dept: PHARMACY | Facility: MEDICAL CENTER | Age: 45
End: 2025-05-30
Payer: COMMERCIAL

## 2025-05-30 DIAGNOSIS — G43.709 CHRONIC MIGRAINE WITHOUT AURA WITHOUT STATUS MIGRAINOSUS, NOT INTRACTABLE: Primary | ICD-10-CM

## 2025-05-30 RX ORDER — TOPIRAMATE 25 MG/1
25 TABLET, FILM COATED ORAL 2 TIMES DAILY
Qty: 60 TABLET | Refills: 11 | Status: SHIPPED | OUTPATIENT
Start: 2025-05-30 | End: 2026-05-25

## 2025-05-30 NOTE — TELEPHONE ENCOUNTER
Received New Start request via [employer]  for   topiramate (TOPAMAX) 25 MG Tab. (Quantity:60, Day Supply:30)     Insurance: [facility] [employer]  Member ID:  [id number]  BIN: [id number]  PCN: [id number]  Group: [id number]     Ran Test claim via [facility] [employer] [facility] for a $4.95 copay. Will outreach to patient to offer specialty pharmacy services and or release to preferred pharmacy    Will release to pt's preferred pharmacy.

## 2025-06-02 NOTE — PROGRESS NOTES
Urogynecology and Pelvic Reconstructive Surgery  Follow Up    Estelita Gates MRN:[id number] :1980    Referred by: Pavithra Hadley MD    Reason for Visit:   Chief Complaint   Patient presents with    Follow-Up     Pre-Op and CMG         Subjective     History of Presenting Illness:    Estelita Gates is a 44 y.o. year old P2 with prolapse who presents for follow-up    She underwent CMG today which did not demonstrate occult stress incontinence with reduction of prolapse    She has had a chance to review all preoperative materials and brings questions about her procedure.    Initial HPI: She was referred by Dr. Hadley (PCP) for the evaluation and management of rectocele. She is most bothered by vaginal bulge and occasional fecal incontinence.  She is very active, and training for a , and is noted while training/running a bulge which is very irritating.  She has seen her GYN previously but has not undergone any treatments.   She is also seeing Dr. Jenna Martin (colorectal) for bleeding internal hemorrhoids, and she was referred to GI for treatment of these hemorrhoids, scheduled for banding on .  Her external hemorrhoids are minimally bothersome. She strongly interested in definitive surgical management of her prolapse and potentially stool incontinence, however this will have to be deferred until after her back felt trial in the fall. She is currently done with childbearing, on lo Loestrin for birth control and bleeding management.    Prior Pelvic surgery:    laparoscopic hernia repair ([facility])      Prior treatment:   Pessary (#3 ring - too small, #4 ring)     Fluid intake:   6-8 cups wter  1-2 cups coffee    Pelvic floor symptom review:     Bladder:   Voids per day: 3-6 Voids per night: 0-1      Urinary incontinence episodes per day: occasional stress   Bladder emptying: Complete   Voiding symptoms: None   UTI in last 12 months: No   Other urologic history: None      Prolapse:      Prolapse symptoms: Bulge, Exteriorized Tissue, and Pelvic Pressure   Degree of prolapse: To Introitus     Bowel:    Constipation:IBS-D,  has outlet constipation. Avoids caffeine. She is sensitive with fiber. Uses imodie   Bowel movements per day: 2    Straining to empty bowels: No    Splinting to evacuate: Yes   Painful evacuation: No    Difficulty emptying rectum: No    Incontinence to stool: yes, not too often with diet/caffeine management. Once every few months, staining   Blood in stool: Yes - hemorrhoids   Hemorrhoids: Yes   Bowel conditions: IBS      Sexual function:    Sexually active: Yes   Gender of partners: Male   Pain with intercourse: No        Past medical and surgical history    Past obstetric history   Number of vaginal deliveries: 2   Number of  deliveries: 0   History of vacuum/forceps: No    History of obstetric anal sphincter injury: Yes    Past gynecological history:    Last menstrual period: No LMP recorded. (Menstrual status: Other).   History of abnormal uterine bleeding: No    History of fibroids: No    History of endometrial polyps:  No    History of endometriosis: No    History of cervical dysplasia: No    Last pap:    Current contraception: loloestrin      Past medical history:  Past Medical History:   Diagnosis Date    Pain 2023    right wrist    Gynecological disorder     Irritable bowel     Migraines      Past surgical history:  Past Surgical History:   Procedure Laterality Date    GANGLION EXCISION Right 2023    Procedure: RIGHT WRIST GANGLION CYST EXCISION;  Surgeon: Estelita Francois M.D.;  Location: SURGERY SAME DAY [facility];  Service: Orthopedics    PB KNEE SCOPE,DIAGNOSTIC Right 10/26/2022    Procedure: Knee arthroscopy with chondroplasty, limited synovectomy, open cyst excision with hardware removal and bone grafting, surgeries as indicated;  Surgeon: Ventura Dela Cruz M.D.;  Location: SURGERY [facility];  Service: Orthopedics    PB EXCIS  TENDON SHEATH LESION, HAND/FINGER Right 10/26/2022    Procedure: EXCISION, CYST;  Surgeon: Ventura Dela Cruz M.D.;  Location: SURGERY [facility];  Service: Orthopedics    CHONDROPLASTY Right 10/26/2022    Procedure: CHONDROPLASTY;  Surgeon: Ventura Dela Cruz M.D.;  Location: SURGERY [facility];  Service: Orthopedics    SYNOVECTOMY Right 10/26/2022    Procedure: SYNOVECTOMY;  Surgeon: Ventura Dela Cruz M.D.;  Location: SURGERY [facility];  Service: Orthopedics    HARDWARE REMOVAL ORTHO Right 10/26/2022    Procedure: REMOVAL, HARDWARE;  Surgeon: Ventura Dela Cruz M.D.;  Location: SURGERY [facility];  Service: Orthopedics    BONE GRAFT Right 10/26/2022    Procedure: BONE GRAFT;  Surgeon: Ventura Dela Cruz M.D.;  Location: SURGERY [facility];  Service: Orthopedics    PB EXCIS TENDON SHEATH LESION, HAND/FINGER Right 04/11/2022    Procedure: EXCISION OF GANGLION CYST RIGHT WRIST;  Surgeon: Estelita Francois M.D.;  Location: SURGERY SAME DAY [facility];  Service: Orthopedics    OTHER ORTHOPEDIC SURGERY Right 03/2020    ACL    OTHER ORTHOPEDIC SURGERY Left 04/2017    ACL    HERNIA REPAIR      6/2024    OTHER      TIF procedure 6/2024     Medications:has a current medication list which includes the following prescription(s): tirzepatide-weight management, topiramate, gabapentin, emgality, lo loestrin fe, ubrelvy, ubrogepant, duloxetine, zavzpret, magnesium oxide, riboflavin, b complex-c, and cetirizine hcl, and the following Facility-Administered Medications: onabotulinum toxin a.  Allergies:Cefaclor and Penicillins  Family history:No family history on file.  Social history: reports that she has never smoked. She has never been exposed to tobacco smoke. She has never used smokeless tobacco. She reports current alcohol use of about 1.8 oz of alcohol per week. She reports that she does not use drugs.    Review of systems: A full review of systems was performed, and negative with the exception of want is noted above in  "the HPI.        Objective        /86 (BP Location: Right arm, Patient Position: Sitting, BP Cuff Size: Adult)   Pulse 91   Ht 5' 5\"   Wt 144 lb   BMI 23.96 kg/m²     Physical Exam  Vitals reviewed. Exam conducted with a chaperone present (MA - see notes.).   Constitutional:       Appearance: Normal appearance.   HENT:      Head: Normocephalic.      Mouth/Throat:      Mouth: Mucous membranes are moist.   Cardiovascular:      Rate and Rhythm: Normal rate.   Pulmonary:      Effort: Pulmonary effort is normal.   Abdominal:      Palpations: Abdomen is soft. There is no mass.      Tenderness: There is no abdominal tenderness.   Skin:     General: Skin is warm and dry.   Neurological:      Mental Status: She is alert.   Psychiatric:         Mood and Affect: Mood normal.         Genitourinary:    External female genitalia: WNL   Vulva: WNL   Bulbocavernosus reflex: Intact   Anal wink reflex: Intact   Perineal sensation: WNL   Urethra: Hypermobile   Vagina: WNL   Atrophy: None   Cough stress test: Negative    Pelvic floor:    POP-Q: Prior Aa 0 / Ba 0 / TVL 10 / C -4 / D -6 / Ap -1 / Bp -1 / GH 4 / PB 2   Palpable rectocele and perineocele   Prolapse stage: 2   Paravaginal defect: mild   Cervical elongation: No    Urethral tenderness: No    Bladder/ suprapubic tenderness: No    Levator tenderness: None   Levator muscle tone: Hypertonic   Pelvic floor contraction strength (modified Oxford scale): 3=Moderate   Pelvic floor contraction duration: Normal   Bimanual exam: Small, Mobile Uterus   Anal resting tone: WNL   Anal squeeze tone: WNL   Palpable anal sphincter defect: No    Granulation tissue: No    Epithelial erosions: No    Epithelial ulcerations: No    Fistula: None   Vaginal band/stricture: No     Procedure Performed: No    Diagnostic test and records review:       Post-void residual: 12 mL, performed by Bladder Scanner    Radiology: n/a    Documentation reviewed: Prior EMR Records           Assessment & Plan "     Estelita Gates is a 44 y.o. year old P2 with symptomatic stage II uterovaginal prolapse, outlet dysfunction constipation, fecal staining.       Incomplete uterovaginal prolapse   Cystocele, midline   Rectocele   Outlet dysfunction constipation   Perineocele  - We repeated surgical counseling, specifically focusing on native tissue versus mesh augmented options, for her, I would recommend either a native tissue hysteropexy with repairs, performed laparoscopically, or hysterectomy with sacrocolpopexy.  The benefits and drawbacks of each treatment were reviewed with her including the risks and benefits of surgical mesh.  After detailed counseling about all prolapse treatment options and shared decision-making, she opts for a mesh augmented reconstructive approach to prolapse repair via robotic assisted laparoscopic supracervical hysterectomy, bilateral salpingectomy, sacrocolpopexy, posterior repair/perineorrhaphy, and all indicated procedures.      Pre-operative cystometrogram did not demonstrate stress urinary incontinence with reduction of prolapse and she was counseled against a concomitant midurethral sling.     Benefits of surgery were reviewed, including functional outcomes (bladder/bowel/sexual). Risks of surgery were  also discussed including anesthesia, bleeding, infection, damage to surrounding organs (bladder, ureter, urethra, bowel, blood vessel, nerves), possible blood transfusion, recurrent prolapse, transient buttock pain if sacrospinous suspension performed, mesh exposure/erosion, transient voiding dysfunction requiring catheterization, new/worsening urinary incontinence, pain with sex.     Specifically she was counselled as to what to expect on the day of surgery in the holding area, counseled that medical students may be involved in her care. She will likely go home on the same day as the surgery. She was counseled on what to expect in the recovery room including voiding trial and possible  vaginal packing removal, as well as what to expect if voiding trial is unsuccessful, which would be transient catheterization.   Discussed trajectory of recovery, including maximizing NSAIDs and Tylenol, and that narcotics are not routinely given.  Discussed restrictions including heavy lifting that requires straining, driving while on narcotics, nothing in the vagina and no bathing/swimming for at least 6 weeks until evaluated in the office.  Return to work discussed.    *Please see the corresponding after visit summary counseling packet for detailed counseling provided for the patient.   Labs:at pre op  Pre-op meds: acetaminophen 1000mg PO, phenazopyridine 200mg PO, Cefazolin 2gm IV   Post-op medication plan: ibuprofen, tylenol, miralax   Home medication review: She should additionally hold all NSAIDs and supplements for 1 week prior to surgery. Sh is no longer taking celebrex/meloxicam. Stop aspirin 1 week prior to surgery.      H/o Bartholin cyst  No cyst seen today. Continue observation.    Fecal staining  She is now s/p hemorrhoid banding, but has persistent issues.  Reviewed again, and she understands that with her prolapse repair and repair of the perineum she likely has a chance of improving the symptoms due to improved rectal emptying. She will require pelvic PT postoperatively to maximize success rates.     Internal hemorrhoids  Sp banding 6/2023             Pranav Mohan MD, FACOG  Urogynecology and Pelvic Reconstructive Surgery  Department of Obstetrics and Gynecology  [facility]        This medical record contains text that has been entered with the assistance of computer voice recognition and dictation software.  Therefore, it may contain unintended errors in text, spelling, punctuation, or grammar

## 2025-06-03 ENCOUNTER — PRE-ADMISSION TESTING (OUTPATIENT)
Dept: ADMISSIONS | Facility: MEDICAL CENTER | Age: 45
End: 2025-06-03
Attending: STUDENT IN AN ORGANIZED HEALTH CARE EDUCATION/TRAINING PROGRAM
Payer: COMMERCIAL

## 2025-06-03 ENCOUNTER — GYNECOLOGY VISIT (OUTPATIENT)
Dept: GYNECOLOGY | Facility: CLINIC | Age: 45
End: 2025-06-03
Payer: COMMERCIAL

## 2025-06-03 VITALS
DIASTOLIC BLOOD PRESSURE: 86 MMHG | WEIGHT: 144 LBS | SYSTOLIC BLOOD PRESSURE: 125 MMHG | HEART RATE: 91 BPM | HEIGHT: 65 IN | BODY MASS INDEX: 23.99 KG/M2

## 2025-06-03 DIAGNOSIS — N81.81 PERINEOCELE: ICD-10-CM

## 2025-06-03 DIAGNOSIS — N81.6 RECTOCELE: ICD-10-CM

## 2025-06-03 DIAGNOSIS — N81.11 CYSTOCELE, MIDLINE: ICD-10-CM

## 2025-06-03 DIAGNOSIS — N81.2 INCOMPLETE UTEROVAGINAL PROLAPSE: Primary | ICD-10-CM

## 2025-06-03 DIAGNOSIS — R15.1 FECAL SMEARING: ICD-10-CM

## 2025-06-03 PROCEDURE — 3074F SYST BP LT 130 MM HG: CPT | Performed by: STUDENT IN AN ORGANIZED HEALTH CARE EDUCATION/TRAINING PROGRAM

## 2025-06-03 PROCEDURE — 99459 PELVIC EXAMINATION: CPT | Performed by: STUDENT IN AN ORGANIZED HEALTH CARE EDUCATION/TRAINING PROGRAM

## 2025-06-03 PROCEDURE — 51725 SIMPLE CYSTOMETROGRAM: CPT | Performed by: STUDENT IN AN ORGANIZED HEALTH CARE EDUCATION/TRAINING PROGRAM

## 2025-06-03 PROCEDURE — 3079F DIAST BP 80-89 MM HG: CPT | Performed by: STUDENT IN AN ORGANIZED HEALTH CARE EDUCATION/TRAINING PROGRAM

## 2025-06-03 PROCEDURE — 99214 OFFICE O/P EST MOD 30 MIN: CPT | Mod: 25 | Performed by: STUDENT IN AN ORGANIZED HEALTH CARE EDUCATION/TRAINING PROGRAM

## 2025-06-03 ASSESSMENT — LIFESTYLE VARIABLES
HOW MANY STANDARD DRINKS CONTAINING ALCOHOL DO YOU HAVE ON A TYPICAL DAY: 3 OR 4
HOW OFTEN DO YOU HAVE A DRINK CONTAINING ALCOHOL: 2-4 TIMES A MONTH
HOW OFTEN DO YOU HAVE SIX OR MORE DRINKS ON ONE OCCASION: NEVER
SKIP TO QUESTIONS 9-10: 0
AUDIT-C TOTAL SCORE: 3

## 2025-06-03 NOTE — PREADMIT AVS NOTE
Current Medications   Medication Instructions    Tirzepatide-Weight Management (ZEPBOUND SC) Stop 7 days before surgery    topiramate (TOPAMAX) 25 MG Tab Continue taking as prescribed.    gabapentin (NEURONTIN) 300 MG Cap Continue taking as prescribed.    Galcanezumab-gnlm (EMGALITY) 120 MG/ML Solution Auto-injector Follow instructions from surgeon or specialist.    LO LOESTRIN FE 1 MG-10 MCG / 10 MCG Tab Hold medication day of procedure    Ubrogepant (UBRELVY) 50 MG Tab Hold medication day of procedure    DULoxetine (CYMBALTA) 30 MG Cap DR Particles Continue taking as prescribed.    Zavegepant HCl (ZAVZPRET) 10 MG/ACT Solution Follow instructions from surgeon or specialist.    magnesium oxide (MAG-OX) 400 MG Tab tablet Stop 7 days before surgery    Riboflavin 400 MG Cap Stop 7 days before surgery    B Complex-C (SUPER B COMPLEX PO) Stop 7 days before surgery    Cetirizine HCl (ZYRTEC ALLERGY PO) Continue taking as prescribed.

## 2025-06-03 NOTE — PROGRESS NOTES
PT here today for a Pre-Op and CMG  Hysterectomy? No  UTI Symtoms? No  Good #: [phone]  Pharmacy Verified  Surgery [facility] 06.25.25

## 2025-06-03 NOTE — PATIENT INSTRUCTIONS
"Pre op preparation:     Get ahead of surgery:    Please obtain all of the following meds over the counter prior to surgery. These will factor largely into your pain control and bowel management, and are not prescribed:    Miralax powder - start taking 1 week before surgery and continue until 4 weeks after surgery to prevent constipation  Ibuprofen 200mg tablets (\"advil\")  Acetaminophen 500mg tablets (\"Tylenol extra strength\")    Medication management:    Continue all medications until the day before surgery (do not take on the morning of surgery) unless instructed below.     STOP 2 weeks before surgery: tirzepatide     STOP 1 week before surgery:   Any medications with aspirin  before surgery (includes \"baby aspirin\", excedrin, fiorinal)  Any vitamins/supplements  NSAIDs such as ibuprofen/ketorolac/meloxicam/celebrex    Fasting and hydration:    Do not eat any solid foods after midnight before the procedure  You are encouraged to drink clear liquids on the day of surgery, up until 3 hours before your scheduled surgery time. The best clear liquid is an electrolyte drink (gatorade, pedialyte, liquidIV), or water. Black coffee counts as a clear liquid. You may not drink anything with milk, fat, pulp, or solids, or your surgery will be delayed/cancelled. You must not drink anything 3 hours before the procedure, unless instructed to do so by your pre-op nurse.     "

## 2025-06-04 NOTE — PROCEDURES
Procedure Note - Cystometrogram    Procedure: Simple cystometrogram (15870)    Pre-operative diagnosis:  Incomplete uterovaginal prolapse (N81.2), pre-operative assessment for occult stress incontinence with prolapse reduction    Verbal consent was obtained after review of risk and benefit.     Chaperone: nAeta Alvarez MD    Procedure: The patient was placed in the lithotomy position in the exam. She underwent sterile prep with betadine prior to catheterization. There was a negative urinalysis.  A 10F straight catheter was easily placed into the bladder.  With a postvoid residual of 5 mL.  The bladder was slowly filled against gravity with sterile water, until capacity was reached. Prolapse was reduced with a cotton scopette for stress testing. There were no complications.     Findings:    First sensation: 150 mL  Strong Desire: 200 mL  CMG stopped: 300 mL  Stress leakage with prolapse reduction:   Empty: neg  Capacity: neg  Leakage with DO: no  Uninhibited detrusor contractions present: no    Assessment:   - No MITCHELL with prolapse reduction    Plan:   - See corresponding E/M visit for full surgical plan.       Pranav Mohan MD

## 2025-06-18 ENCOUNTER — PATIENT MESSAGE (OUTPATIENT)
Dept: NEUROLOGY | Facility: MEDICAL CENTER | Age: 45
End: 2025-06-18
Payer: COMMERCIAL

## 2025-06-23 ENCOUNTER — PRE-ADMISSION TESTING (OUTPATIENT)
Dept: ADMISSIONS | Facility: MEDICAL CENTER | Age: 45
End: 2025-06-23
Attending: STUDENT IN AN ORGANIZED HEALTH CARE EDUCATION/TRAINING PROGRAM
Payer: COMMERCIAL

## 2025-06-23 DIAGNOSIS — Z01.812 PRE-OPERATIVE LABORATORY EXAMINATION: Primary | ICD-10-CM

## 2025-06-23 NOTE — OR NURSING
Patient missed pre-admit lab appointment, left voice message to reschedule at [phone] opt 2 opt 1.  Patient rescheduled to 6/24 @ 9:30.

## 2025-06-24 ENCOUNTER — PRE-ADMISSION TESTING (OUTPATIENT)
Dept: ADMISSIONS | Facility: MEDICAL CENTER | Age: 45
End: 2025-06-24
Attending: STUDENT IN AN ORGANIZED HEALTH CARE EDUCATION/TRAINING PROGRAM
Payer: COMMERCIAL

## 2025-06-24 DIAGNOSIS — Z01.812 PRE-OPERATIVE LABORATORY EXAMINATION: ICD-10-CM

## 2025-06-24 LAB
ANION GAP SERPL CALC-SCNC: 11 MMOL/L (ref 7–16)
BUN SERPL-MCNC: 13 MG/DL (ref 8–22)
CALCIUM SERPL-MCNC: 8.9 MG/DL (ref 8.5–10.5)
CHLORIDE SERPL-SCNC: 105 MMOL/L (ref 96–112)
CO2 SERPL-SCNC: 23 MMOL/L (ref 20–33)
CREAT SERPL-MCNC: 0.8 MG/DL (ref 0.5–1.4)
ERYTHROCYTE [DISTWIDTH] IN BLOOD BY AUTOMATED COUNT: 42.8 FL (ref 35.9–50)
GFR SERPLBLD CREATININE-BSD FMLA CKD-EPI: 93 ML/MIN/1.73 M 2
GLUCOSE SERPL-MCNC: 82 MG/DL (ref 65–99)
HCG SERPL QL: NEGATIVE
HCT VFR BLD AUTO: 40.1 % (ref 37–47)
HGB BLD-MCNC: 14 G/DL (ref 12–16)
MCH RBC QN AUTO: 31.9 PG (ref 27–33)
MCHC RBC AUTO-ENTMCNC: 34.9 G/DL (ref 32.2–35.5)
MCV RBC AUTO: 91.3 FL (ref 81.4–97.8)
PLATELET # BLD AUTO: 231 K/UL (ref 164–446)
PMV BLD AUTO: 11.2 FL (ref 9–12.9)
POTASSIUM SERPL-SCNC: 3.7 MMOL/L (ref 3.6–5.5)
RBC # BLD AUTO: 4.39 M/UL (ref 4.2–5.4)
SODIUM SERPL-SCNC: 139 MMOL/L (ref 135–145)
WBC # BLD AUTO: 8.9 K/UL (ref 4.8–10.8)

## 2025-06-24 PROCEDURE — 36415 COLL VENOUS BLD VENIPUNCTURE: CPT

## 2025-06-24 PROCEDURE — 84703 CHORIONIC GONADOTROPIN ASSAY: CPT

## 2025-06-24 PROCEDURE — 85027 COMPLETE CBC AUTOMATED: CPT

## 2025-06-24 PROCEDURE — 80048 BASIC METABOLIC PNL TOTAL CA: CPT

## 2025-06-25 ENCOUNTER — ANESTHESIA EVENT (OUTPATIENT)
Dept: SURGERY | Facility: MEDICAL CENTER | Age: 45
End: 2025-06-25
Payer: COMMERCIAL

## 2025-06-25 ENCOUNTER — HOSPITAL ENCOUNTER (OUTPATIENT)
Facility: MEDICAL CENTER | Age: 45
End: 2025-06-25
Attending: STUDENT IN AN ORGANIZED HEALTH CARE EDUCATION/TRAINING PROGRAM | Admitting: STUDENT IN AN ORGANIZED HEALTH CARE EDUCATION/TRAINING PROGRAM
Payer: COMMERCIAL

## 2025-06-25 ENCOUNTER — ANESTHESIA (OUTPATIENT)
Dept: SURGERY | Facility: MEDICAL CENTER | Age: 45
End: 2025-06-25
Payer: COMMERCIAL

## 2025-06-25 VITALS
RESPIRATION RATE: 16 BRPM | WEIGHT: 142.64 LBS | BODY MASS INDEX: 23.76 KG/M2 | HEART RATE: 94 BPM | OXYGEN SATURATION: 94 % | DIASTOLIC BLOOD PRESSURE: 63 MMHG | SYSTOLIC BLOOD PRESSURE: 126 MMHG | TEMPERATURE: 97.3 F | HEIGHT: 65 IN

## 2025-06-25 DIAGNOSIS — G89.18 ACUTE POST-OPERATIVE PAIN: Primary | ICD-10-CM

## 2025-06-25 LAB — PATHOLOGY CONSULT NOTE: NORMAL

## 2025-06-25 PROCEDURE — 700105 HCHG RX REV CODE 258: Performed by: STUDENT IN AN ORGANIZED HEALTH CARE EDUCATION/TRAINING PROGRAM

## 2025-06-25 PROCEDURE — 110371 HCHG SHELL REV 272: Performed by: STUDENT IN AN ORGANIZED HEALTH CARE EDUCATION/TRAINING PROGRAM

## 2025-06-25 PROCEDURE — 160046 HCHG PACU - 1ST 60 MINS PHASE II: Performed by: STUDENT IN AN ORGANIZED HEALTH CARE EDUCATION/TRAINING PROGRAM

## 2025-06-25 PROCEDURE — 160191 HCHG ANESTHESIA STANDARD: Performed by: STUDENT IN AN ORGANIZED HEALTH CARE EDUCATION/TRAINING PROGRAM

## 2025-06-25 PROCEDURE — 700102 HCHG RX REV CODE 250 W/ 637 OVERRIDE(OP): Performed by: ANESTHESIOLOGY

## 2025-06-25 PROCEDURE — 700111 HCHG RX REV CODE 636 W/ 250 OVERRIDE (IP): Mod: JZ | Performed by: ANESTHESIOLOGY

## 2025-06-25 PROCEDURE — 58542 LSH W/T/O UT 250 G OR LESS: CPT | Mod: AS | Performed by: NURSE PRACTITIONER

## 2025-06-25 PROCEDURE — 700102 HCHG RX REV CODE 250 W/ 637 OVERRIDE(OP): Performed by: STUDENT IN AN ORGANIZED HEALTH CARE EDUCATION/TRAINING PROGRAM

## 2025-06-25 PROCEDURE — 160042 HCHG SURGERY MINUTES - EA ADDL 1 MIN LEVEL 5: Performed by: STUDENT IN AN ORGANIZED HEALTH CARE EDUCATION/TRAINING PROGRAM

## 2025-06-25 PROCEDURE — 160015 HCHG STAT PREOP MINUTES: Performed by: STUDENT IN AN ORGANIZED HEALTH CARE EDUCATION/TRAINING PROGRAM

## 2025-06-25 PROCEDURE — 57250 REPAIR RECTUM & VAGINA: CPT | Performed by: STUDENT IN AN ORGANIZED HEALTH CARE EDUCATION/TRAINING PROGRAM

## 2025-06-25 PROCEDURE — 700101 HCHG RX REV CODE 250: Performed by: ANESTHESIOLOGY

## 2025-06-25 PROCEDURE — 110454 HCHG SHELL REV 250: Performed by: STUDENT IN AN ORGANIZED HEALTH CARE EDUCATION/TRAINING PROGRAM

## 2025-06-25 PROCEDURE — 88304 TISSUE EXAM BY PATHOLOGIST: CPT | Mod: 26 | Performed by: PATHOLOGY

## 2025-06-25 PROCEDURE — 160048 HCHG OR STATISTICAL LEVEL 1-5: Performed by: STUDENT IN AN ORGANIZED HEALTH CARE EDUCATION/TRAINING PROGRAM

## 2025-06-25 PROCEDURE — C1781 MESH (IMPLANTABLE): HCPCS | Performed by: STUDENT IN AN ORGANIZED HEALTH CARE EDUCATION/TRAINING PROGRAM

## 2025-06-25 PROCEDURE — 88304 TISSUE EXAM BY PATHOLOGIST: CPT | Performed by: PATHOLOGY

## 2025-06-25 PROCEDURE — 88305 TISSUE EXAM BY PATHOLOGIST: CPT | Performed by: PATHOLOGY

## 2025-06-25 PROCEDURE — 160193 HCHG PACU STANDARD - 1ST 60 MINS: Performed by: STUDENT IN AN ORGANIZED HEALTH CARE EDUCATION/TRAINING PROGRAM

## 2025-06-25 PROCEDURE — 58542 LSH W/T/O UT 250 G OR LESS: CPT | Performed by: STUDENT IN AN ORGANIZED HEALTH CARE EDUCATION/TRAINING PROGRAM

## 2025-06-25 PROCEDURE — 502714 HCHG ROBOTIC SURGERY SERVICES: Performed by: STUDENT IN AN ORGANIZED HEALTH CARE EDUCATION/TRAINING PROGRAM

## 2025-06-25 PROCEDURE — 700111 HCHG RX REV CODE 636 W/ 250 OVERRIDE (IP): Performed by: ANESTHESIOLOGY

## 2025-06-25 PROCEDURE — 57425 LAPAROSCOPY SURG COLPOPEXY: CPT | Performed by: STUDENT IN AN ORGANIZED HEALTH CARE EDUCATION/TRAINING PROGRAM

## 2025-06-25 PROCEDURE — 700111 HCHG RX REV CODE 636 W/ 250 OVERRIDE (IP): Performed by: STUDENT IN AN ORGANIZED HEALTH CARE EDUCATION/TRAINING PROGRAM

## 2025-06-25 PROCEDURE — 88305 TISSUE EXAM BY PATHOLOGIST: CPT | Mod: 26 | Performed by: PATHOLOGY

## 2025-06-25 PROCEDURE — 160002 HCHG RECOVERY MINUTES (STAT): Performed by: STUDENT IN AN ORGANIZED HEALTH CARE EDUCATION/TRAINING PROGRAM

## 2025-06-25 PROCEDURE — 160047 HCHG PACU  - EA ADDL 30 MINS PHASE II: Performed by: STUDENT IN AN ORGANIZED HEALTH CARE EDUCATION/TRAINING PROGRAM

## 2025-06-25 PROCEDURE — 700101 HCHG RX REV CODE 250: Performed by: STUDENT IN AN ORGANIZED HEALTH CARE EDUCATION/TRAINING PROGRAM

## 2025-06-25 PROCEDURE — 57425 LAPAROSCOPY SURG COLPOPEXY: CPT | Mod: AS | Performed by: NURSE PRACTITIONER

## 2025-06-25 PROCEDURE — 502240 HCHG MISC OR SUPPLY RC 0272: Performed by: STUDENT IN AN ORGANIZED HEALTH CARE EDUCATION/TRAINING PROGRAM

## 2025-06-25 PROCEDURE — A9270 NON-COVERED ITEM OR SERVICE: HCPCS | Performed by: STUDENT IN AN ORGANIZED HEALTH CARE EDUCATION/TRAINING PROGRAM

## 2025-06-25 PROCEDURE — 57250 REPAIR RECTUM & VAGINA: CPT | Mod: AS | Performed by: NURSE PRACTITIONER

## 2025-06-25 PROCEDURE — 160025 RECOVERY II MINUTES (STATS): Performed by: STUDENT IN AN ORGANIZED HEALTH CARE EDUCATION/TRAINING PROGRAM

## 2025-06-25 PROCEDURE — A9270 NON-COVERED ITEM OR SERVICE: HCPCS | Performed by: ANESTHESIOLOGY

## 2025-06-25 PROCEDURE — 160031 HCHG SURGERY MINUTES - 1ST 30 MINS LEVEL 5: Performed by: STUDENT IN AN ORGANIZED HEALTH CARE EDUCATION/TRAINING PROGRAM

## 2025-06-25 DEVICE — MESH SURGICAL UPSYLON 35.4CM (1EA): Type: IMPLANTABLE DEVICE | Status: FUNCTIONAL

## 2025-06-25 RX ORDER — ONDANSETRON 2 MG/ML
4 INJECTION INTRAMUSCULAR; INTRAVENOUS
Status: COMPLETED | OUTPATIENT
Start: 2025-06-25 | End: 2025-06-25

## 2025-06-25 RX ORDER — ACETAMINOPHEN 500 MG
1000 TABLET ORAL
Status: COMPLETED | OUTPATIENT
Start: 2025-06-25 | End: 2025-06-25

## 2025-06-25 RX ORDER — BUPIVACAINE HYDROCHLORIDE 2.5 MG/ML
INJECTION, SOLUTION EPIDURAL; INFILTRATION; INTRACAUDAL; PERINEURAL
Status: DISCONTINUED | OUTPATIENT
Start: 2025-06-25 | End: 2025-06-25 | Stop reason: HOSPADM

## 2025-06-25 RX ORDER — LIDOCAINE HYDROCHLORIDE 20 MG/ML
INJECTION, SOLUTION EPIDURAL; INFILTRATION; INTRACAUDAL; PERINEURAL PRN
Status: DISCONTINUED | OUTPATIENT
Start: 2025-06-25 | End: 2025-06-25 | Stop reason: SURG

## 2025-06-25 RX ORDER — DEXAMETHASONE SODIUM PHOSPHATE 4 MG/ML
INJECTION, SOLUTION INTRA-ARTICULAR; INTRALESIONAL; INTRAMUSCULAR; INTRAVENOUS; SOFT TISSUE PRN
Status: DISCONTINUED | OUTPATIENT
Start: 2025-06-25 | End: 2025-06-25 | Stop reason: SURG

## 2025-06-25 RX ORDER — EPHEDRINE SULFATE 50 MG/ML
INJECTION, SOLUTION INTRAVENOUS PRN
Status: DISCONTINUED | OUTPATIENT
Start: 2025-06-25 | End: 2025-06-25 | Stop reason: SURG

## 2025-06-25 RX ORDER — HYDROMORPHONE HYDROCHLORIDE 1 MG/ML
0.1 INJECTION, SOLUTION INTRAMUSCULAR; INTRAVENOUS; SUBCUTANEOUS
Status: DISCONTINUED | OUTPATIENT
Start: 2025-06-25 | End: 2025-06-25 | Stop reason: HOSPADM

## 2025-06-25 RX ORDER — DIPHENHYDRAMINE HYDROCHLORIDE 50 MG/ML
12.5 INJECTION, SOLUTION INTRAMUSCULAR; INTRAVENOUS
Status: DISCONTINUED | OUTPATIENT
Start: 2025-06-25 | End: 2025-06-25 | Stop reason: HOSPADM

## 2025-06-25 RX ORDER — LIDOCAINE HYDROCHLORIDE AND EPINEPHRINE 10; 10 MG/ML; UG/ML
INJECTION, SOLUTION INFILTRATION; PERINEURAL
Status: DISCONTINUED | OUTPATIENT
Start: 2025-06-25 | End: 2025-06-25 | Stop reason: HOSPADM

## 2025-06-25 RX ORDER — HALOPERIDOL 5 MG/ML
1 INJECTION INTRAMUSCULAR
Status: DISCONTINUED | OUTPATIENT
Start: 2025-06-25 | End: 2025-06-25 | Stop reason: HOSPADM

## 2025-06-25 RX ORDER — OXYCODONE HCL 5 MG/5 ML
10 SOLUTION, ORAL ORAL
Status: COMPLETED | OUTPATIENT
Start: 2025-06-25 | End: 2025-06-25

## 2025-06-25 RX ORDER — ONDANSETRON 2 MG/ML
INJECTION INTRAMUSCULAR; INTRAVENOUS PRN
Status: DISCONTINUED | OUTPATIENT
Start: 2025-06-25 | End: 2025-06-25 | Stop reason: SURG

## 2025-06-25 RX ORDER — OXYCODONE HCL 5 MG/5 ML
5 SOLUTION, ORAL ORAL
Status: COMPLETED | OUTPATIENT
Start: 2025-06-25 | End: 2025-06-25

## 2025-06-25 RX ORDER — SODIUM CHLORIDE, SODIUM LACTATE, POTASSIUM CHLORIDE, CALCIUM CHLORIDE 600; 310; 30; 20 MG/100ML; MG/100ML; MG/100ML; MG/100ML
INJECTION, SOLUTION INTRAVENOUS CONTINUOUS
Status: ACTIVE | OUTPATIENT
Start: 2025-06-25 | End: 2025-06-25

## 2025-06-25 RX ORDER — OXYCODONE HYDROCHLORIDE 5 MG/1
5 TABLET ORAL EVERY 8 HOURS PRN
Qty: 8 TABLET | Refills: 0 | Status: SHIPPED | OUTPATIENT
Start: 2025-06-25 | End: 2025-06-28

## 2025-06-25 RX ORDER — MIDAZOLAM HYDROCHLORIDE 1 MG/ML
INJECTION INTRAMUSCULAR; INTRAVENOUS PRN
Status: DISCONTINUED | OUTPATIENT
Start: 2025-06-25 | End: 2025-06-25 | Stop reason: SURG

## 2025-06-25 RX ORDER — SODIUM CHLORIDE, SODIUM LACTATE, POTASSIUM CHLORIDE, CALCIUM CHLORIDE 600; 310; 30; 20 MG/100ML; MG/100ML; MG/100ML; MG/100ML
INJECTION, SOLUTION INTRAVENOUS CONTINUOUS
Status: DISCONTINUED | OUTPATIENT
Start: 2025-06-25 | End: 2025-06-25 | Stop reason: HOSPADM

## 2025-06-25 RX ORDER — HYDROMORPHONE HYDROCHLORIDE 1 MG/ML
0.2 INJECTION, SOLUTION INTRAMUSCULAR; INTRAVENOUS; SUBCUTANEOUS
Status: DISCONTINUED | OUTPATIENT
Start: 2025-06-25 | End: 2025-06-25 | Stop reason: HOSPADM

## 2025-06-25 RX ORDER — HYDROMORPHONE HYDROCHLORIDE 1 MG/ML
0.4 INJECTION, SOLUTION INTRAMUSCULAR; INTRAVENOUS; SUBCUTANEOUS
Status: DISCONTINUED | OUTPATIENT
Start: 2025-06-25 | End: 2025-06-25 | Stop reason: HOSPADM

## 2025-06-25 RX ORDER — CEFAZOLIN SODIUM 1 G/3ML
INJECTION, POWDER, FOR SOLUTION INTRAMUSCULAR; INTRAVENOUS PRN
Status: DISCONTINUED | OUTPATIENT
Start: 2025-06-25 | End: 2025-06-25 | Stop reason: SURG

## 2025-06-25 RX ORDER — HYDROMORPHONE HYDROCHLORIDE 2 MG/ML
INJECTION, SOLUTION INTRAMUSCULAR; INTRAVENOUS; SUBCUTANEOUS PRN
Status: DISCONTINUED | OUTPATIENT
Start: 2025-06-25 | End: 2025-06-25 | Stop reason: SURG

## 2025-06-25 RX ORDER — SCOPOLAMINE 1 MG/3D
1 PATCH, EXTENDED RELEASE TRANSDERMAL ONCE
Status: DISCONTINUED | OUTPATIENT
Start: 2025-06-25 | End: 2025-06-25 | Stop reason: HOSPADM

## 2025-06-25 RX ORDER — PHENAZOPYRIDINE HYDROCHLORIDE 200 MG/1
200 TABLET, FILM COATED ORAL
Status: COMPLETED | OUTPATIENT
Start: 2025-06-25 | End: 2025-06-25

## 2025-06-25 RX ADMIN — LIDOCAINE HYDROCHLORIDE 100 MG: 20 INJECTION, SOLUTION EPIDURAL; INFILTRATION; INTRACAUDAL; PERINEURAL at 09:07

## 2025-06-25 RX ADMIN — HYDROMORPHONE HYDROCHLORIDE 1 MG: 2 INJECTION INTRAMUSCULAR; INTRAVENOUS; SUBCUTANEOUS at 10:54

## 2025-06-25 RX ADMIN — ONDANSETRON 4 MG: 2 INJECTION INTRAMUSCULAR; INTRAVENOUS at 12:50

## 2025-06-25 RX ADMIN — EPHEDRINE SULFATE 10 MG: 50 INJECTION, SOLUTION INTRAVENOUS at 11:31

## 2025-06-25 RX ADMIN — SCOPOLAMINE 1 PATCH: 1.5 PATCH, EXTENDED RELEASE TRANSDERMAL at 08:03

## 2025-06-25 RX ADMIN — SODIUM CHLORIDE, POTASSIUM CHLORIDE, SODIUM LACTATE AND CALCIUM CHLORIDE: 600; 310; 30; 20 INJECTION, SOLUTION INTRAVENOUS at 09:04

## 2025-06-25 RX ADMIN — DEXAMETHASONE SODIUM PHOSPHATE 8 MG: 4 INJECTION INTRA-ARTICULAR; INTRALESIONAL; INTRAMUSCULAR; INTRAVENOUS; SOFT TISSUE at 09:14

## 2025-06-25 RX ADMIN — OXYCODONE HYDROCHLORIDE 10 MG: 5 SOLUTION ORAL at 12:48

## 2025-06-25 RX ADMIN — FENTANYL CITRATE 50 MCG: 50 INJECTION, SOLUTION INTRAMUSCULAR; INTRAVENOUS at 09:07

## 2025-06-25 RX ADMIN — EPHEDRINE SULFATE 10 MG: 50 INJECTION, SOLUTION INTRAVENOUS at 11:22

## 2025-06-25 RX ADMIN — PROPOFOL 200 MG: 10 INJECTION, EMULSION INTRAVENOUS at 09:07

## 2025-06-25 RX ADMIN — FENTANYL CITRATE 50 MCG: 50 INJECTION, SOLUTION INTRAMUSCULAR; INTRAVENOUS at 10:04

## 2025-06-25 RX ADMIN — CEFAZOLIN 2 G: 1 INJECTION, POWDER, FOR SOLUTION INTRAMUSCULAR; INTRAVENOUS at 09:10

## 2025-06-25 RX ADMIN — MIDAZOLAM HYDROCHLORIDE 2 MG: 1 INJECTION, SOLUTION INTRAMUSCULAR; INTRAVENOUS at 09:04

## 2025-06-25 RX ADMIN — SODIUM CHLORIDE, POTASSIUM CHLORIDE, SODIUM LACTATE AND CALCIUM CHLORIDE: 600; 310; 30; 20 INJECTION, SOLUTION INTRAVENOUS at 08:02

## 2025-06-25 RX ADMIN — ROCURONIUM BROMIDE 50 MG: 10 INJECTION, SOLUTION INTRAVENOUS at 09:07

## 2025-06-25 RX ADMIN — ACETAMINOPHEN 1000 MG: 500 TABLET ORAL at 08:03

## 2025-06-25 RX ADMIN — ROCURONIUM BROMIDE 20 MG: 10 INJECTION, SOLUTION INTRAVENOUS at 10:04

## 2025-06-25 RX ADMIN — ONDANSETRON 4 MG: 2 INJECTION INTRAMUSCULAR; INTRAVENOUS at 12:01

## 2025-06-25 RX ADMIN — ROCURONIUM BROMIDE 20 MG: 10 INJECTION, SOLUTION INTRAVENOUS at 10:54

## 2025-06-25 RX ADMIN — EPHEDRINE SULFATE 10 MG: 50 INJECTION, SOLUTION INTRAVENOUS at 11:13

## 2025-06-25 RX ADMIN — PHENAZOPYRIDINE 200 MG: 200 TABLET ORAL at 08:03

## 2025-06-25 ASSESSMENT — PAIN DESCRIPTION - PAIN TYPE
TYPE: SURGICAL PAIN

## 2025-06-25 ASSESSMENT — PAIN SCALES - GENERAL: PAIN_LEVEL: 2

## 2025-06-25 NOTE — ANESTHESIA PREPROCEDURE EVALUATION
Case: [id number] Date/Time: 06/25/25 0840    Procedures:       ROBOTIC ASSISTED LAPAROSCOPIC SUPRACERVICAL HYSTERECTOMY, BILATERAL SALPINGECTOMY, SACROCOLPOPEXY, POSTERIOR REPAIR/PERINEORRHAPHY, POSSIBLE MID URETHRAL SLING      SACROCOLPOPEXY, ROBOT ASSISTED USING DV5      BLADDER SLING, FEMALE      COLPORRHAPHY, COMBINED ANTEROPOSTERIOR    Pre-op diagnosis: INCOMPLETE UTEROVAGINAL PROLAPSE, CYSTOCELE LATERAL, RECTOCELE, STRESS URINARY INCONTINENCE    Location: [facility] OR  / SURGERY [facility]    Surgeons: Pranav Mohan M.D.          43yo for hysterectomy, h/o migraines, 3+ weeks since GLP-1  Relevant Problems   NEURO   (positive) Headache, migraine      CARDIAC   (positive) Headache, migraine   (positive) Internal hemorrhoids      Other   (positive) Synovitis of right knee       Physical Exam    Airway   Mallampati: I  TM distance: >3 FB  Neck ROM: full       Cardiovascular - normal exam  Rhythm: regular  Rate: normal    (-) murmur     Dental - normal exam           Pulmonary - normal examBreath sounds clear to auscultation     Abdominal    Neurological - normal exam                   Anesthesia Plan    ASA 2       Plan - general       Airway plan will be ETT          Induction: intravenous    Postoperative Plan: Postoperative administration of opioids is intended.    Pertinent diagnostic labs and testing reviewed    Informed Consent:    Anesthetic plan and risks discussed with patient.    Use of blood products discussed with: patient whom consented to blood products.

## 2025-06-25 NOTE — OR NURSING
Patient transported off unit with critical care tech, VSS, no apparent distress, patient verbalizes pain well controlled and no nausea at the time. Visible surgical sites appear clean, dry and intact.

## 2025-06-25 NOTE — ANESTHESIA TIME REPORT
Anesthesia Start and Stop Event Times       Date Time Event    6/25/2025 0852 Ready for Procedure     0904 Anesthesia Start     1217 Anesthesia Stop          Responsible Staff  06/25/25      Name Role Begin End    Ivon Garrett M.D. Anesth 0904 1217          Overtime Reason:  no overtime (within assigned shift)    Comments:

## 2025-06-25 NOTE — ANESTHESIA PROCEDURE NOTES
Airway    Date/Time: 6/25/2025 9:08 AM    Performed by: Ivon Garrett M.D.  Authorized by: Ivon Garrett M.D.    Location:  OR  Urgency:  Elective  Difficult Airway: No    Indications for Airway Management:  Anesthesia      Spontaneous Ventilation: absent    Sedation Level:  Deep  Preoxygenated: Yes    Patient Position:  Sniffing  Mask Difficulty Assessment:  1 - vent by mask  Final Airway Type:  Endotracheal airway  Final Endotracheal Airway:  ETT  Cuffed: Yes    Technique Used for Successful ETT Placement:  Direct laryngoscopy    Insertion Site:  Oral  Blade Type:  Mary  Laryngoscope Blade/Videolaryngoscope Blade Size:  3  ETT Size (mm):  7.0  Measured from:  Teeth  ETT to Teeth (cm):  21  Placement Verified by: auscultation and capnometry    Cormack-Lehane Classification:  Grade I - full view of glottis  Number of Attempts at Approach:  1  Number of Other Approaches Attempted:  0

## 2025-06-25 NOTE — OR NURSING
Patient arrived to unit with anesthesia and RN, VSS, no apparent distress. Visible surgical sites assessed with off going team; appear clean, dry and intact.

## 2025-06-25 NOTE — DISCHARGE INSTRUCTIONS
HOME CARE INSTRUCTIONS    ACTIVITY: Rest and take it easy for the first 24 hours.  A responsible adult is recommended to remain with you during that time.  It is normal to feel sleepy.  We encourage you to not do anything that requires balance, judgment or coordination.    FOR 24 HOURS DO NOT:  Drive, operate machinery or run household appliances.  Drink beer or alcoholic beverages.  Make important decisions or sign legal documents.    SPECIAL INSTRUCTIONS:     Post-op: What to expect after your surgery    For urgent post-operative questions or concerns, please call After hours [employer] [facility]  at [phone].  If needed, be sure to inform the answering service that you recently had surgery.     For less-urgent matters (Monday - Friday), you may send a message through [employer] or call the general Women's Health line at [phone].     Bladder function  Try to empty your bladder (urinate) at regular intervals by sitting on the toilet and relaxing.  You may need to adjust your positioning (lean forward or back) to empty the bladder fully. It is important that you do not push or strain to empty your bladder.     Call the surgeon at the number above if you cannot urinate. Also, call for treatment if you have signs and symptoms of a urinary tract infection, including:   Burning with urination  Bladder pain  Worsening need to urinate right away  Urine with a bad smell    If you are sent home with a catheter:   Empty the catheter bag when it becomes full. The bag should be kept at a level below your hips to drain properly. When you are asleep, the bag should dangle off the bed. and should dangle off of the bed while you are asleep. You will be called the day after you go home to schedule an office visit to test your bladder and remove the catheter.     Vaginal care:   Do not go swimming, take sitting baths, or have sexual intercourse for 6 weeks after surgery. Do not place anything in the vagina  except vaginal estrogen cream, if instructed to do so by your surgeon.     Vaginal discharge and bleeding/spotting is also normal through the entire 6-week recovery. Sometimes small sutures will fall out of the vagina as they dissolve. This is normal. Contact the office with any heavy bleeding soaking through pads, bad-smelling discharge, or worsening pain.     Pain management:  Surgical pain is controlled in most patients with only non-steroidal anti-inflammatory drugs (NSAIDs, such as ibuprofen, “Advil”), and acetaminophen (Tylenol). These drugs can be taken together without interaction.     In the hospital, your nurse will give you these medications at regular intervals to both treat and to prevent pain. If these are not controlling your pain, you may ask the nurse for additional medication.     When you go home, you will also take NSAIDs and acetaminophen for pain management. I recommend the following at-home pain regimen:    Take ibuprofen 800mg three times per day (every 8 hrs), along with tylenol 1000mg three times per day (every 8 hrs), for the first 5-7 days after surgery to prevent and treat pain and inflammation.   After 5-7 days, you may take 400mg iburpfen and 500mg tylenol as-needed     Sometimes, a short course of narcotics such as oxycodone and hydromorphone is  required, but this is not routine. We do not recommend using narcotics regularly as it can lead to constipation or dizziness, and falls.     Do not drive or operate heavy machinery while using narcotics. You are unlikely to become addicted if you need to take a narcotic medication a few times within the first week of your surgery.  After the first few days to one week, your pain should decrease and you should not have pain severe enough to need narcotics. If you continue having severe pain, contact your surgeon for re-evaluation.     Abdominal wound care  The incisions on your abdomen will be closed with either small bandages or a surgical  glue. There are also tiny dissolving sutures beneath the skin. You can shower with these in place. In the shower, let the soapy water run over your incisions. Do not scrub or wipe your incisions. Keep the incisions dry for the remainder of the day/night. The bandages will fall off on their own, or you can remove them after at least 3 days if they become discolored or dirty. The glue will also fall off on its own after a few weeks. Small sutures that pop out through the skin are normal and will dissolve over time.    Call us if you feel increasing pain, redness, discharge or warmth at the incision.     Bowel function  Constipation is common after surgery. This means it may take several days before having a normal bowel movement. It is important to take extra steps to keep your stools soft to avoid straining with bowel movements. Straining may damage the prolapse repair before it has healed. Most patients will be given a prescription for a stool softener (docusate) as well as a gentle laxative (Miralax or lactulose). These medications adds water to the stool to make it easier to pass. Take them daily throughout your recovery. Hold for a day if you develop diarrhea.     Call us if you experience any repeated episodes of vomiting, worsening abdominal pain/bloating, or are unable to have a bowel movement for more than 3 days.     Activity restrictions  During the first 6 weeks avoid any type of heavy lifting that requires you to strain.  Gentle walking is good exercise. Start with about 10 minutes a day when you feel ready and build up gradually. Avoid repetitive squatting or bending at the waist.Avoid any fitness-type training, aerobics, etc. for at least 6 weeks after surgery. Generally, you will need 4-6 weeks off work. This period may be longer if you have a very physical job.    Return to sex  When your surgeon clears you to resume sex (if desired), begin slowly and to use enough lubrication to help ease the  discomfort. Because your vagina and pelvis have been re-structured, and it can take time to get used to sex after surgery. As scar tissue softens over time you will feel less discomfort. If discomfort does not go away, contact the office to schedule an exam and to discuss other options. In some cases, your surgeon may prescribe a low dose of vaginal estrogen to help with vaginal dryness and pain that may happen with sex.     DIET: To avoid nausea, slowly advance diet as tolerated, avoiding spicy or greasy foods for the first day.  Add more substantial food to your diet according to your physician's instructions. INCREASE FLUIDS AND FIBER TO AVOID CONSTIPATION.    MEDICATIONS: Resume taking daily medication.  Take prescribed pain medication with food.  If no medication is prescribed, you may take non-aspirin pain medication if needed.  PAIN MEDICATION CAN BE VERY CONSTIPATING.  Take a stool softener or laxative such as senokot, pericolace, or milk of magnesia if needed.    Last pain medication given at 8am (Tylenol, pyridium), 12:45pm (oxycodone).    A follow-up appointment should be arranged with your doctor; call to schedule.    You should CALL YOUR PHYSICIAN if you develop:  Fever greater than 101 degrees F.  Pain not relieved by medication, or persistent nausea or vomiting.  Excessive bleeding (blood soaking through dressing) or unexpected drainage from the wound.  Extreme redness or swelling around the incision site, drainage of pus or foul smelling drainage.  Inability to urinate or empty your bladder within 8 hours.  Problems with breathing or chest pain.    You should call 911 if you develop problems with breathing or chest pain.  If you are unable to contact your doctor or surgical center, you should go to the nearest emergency room or urgent care center.  Physician's telephone #: [phone]    MILD FLU-LIKE SYMPTOMS ARE NORMAL.  YOU MAY EXPERIENCE GENERALIZED MUSCLE ACHES, THROAT IRRITATION, HEADACHE  AND/OR SOME NAUSEA.    If any questions arise, call your doctor.  If your doctor is not available, please feel free to call the Surgical Center at [phone].  The Center is open Monday through Friday from 7AM to 7PM.      A registered nurse may call you a few days after your surgery to see how you are doing after your procedure.    You may also receive a survey in the mail within the next two weeks and we ask that you take a few moments to complete the survey and return it to us.  Our goal is to provide you with very good care and we value your comments.     Depression / Suicide Risk    As you are discharged from this [facility] facility, it is important to learn how to keep safe from harming yourself.    Recognize the warning signs:  Abrupt changes in personality, positive or negative- including increase in energy   Giving away possessions  Change in eating patterns- significant weight changes-  positive or negative  Change in sleeping patterns- unable to sleep or sleeping all the time   Unwillingness or inability to communicate  Depression  Unusual sadness, discouragement and loneliness  Talk of wanting to die  Neglect of personal appearance   Rebelliousness- reckless behavior  Withdrawal from people/activities they love  Confusion- inability to concentrate     If you or a loved one observes any of these behaviors or has concerns about self-harm, here's what you can do:  Talk about it- your feelings and reasons for harming yourself  Remove any means that you might use to hurt yourself (examples: pills, rope, extension cords, firearm)  Get professional help from the community (Mental Health, Substance Abuse, psychological counseling)  Do not be alone:Call your Safe Contact- someone whom you trust who will be there for you.  Call your local CRISIS HOTLINE 835-4673 or [phone]  Call your local Children's Mobile Crisis Response Team [place] (898) 692-5076 or www.[facility]  Call the toll free  National Suicide Prevention Hotlines   National Suicide Prevention Lifeline 103-684-KVLD (8401)  [facility] Line Network 800-SUICIDE (315-0826)    I acknowledge receipt and understanding of these Home Care instructions.

## 2025-06-25 NOTE — ANESTHESIA POSTPROCEDURE EVALUATION
Patient: Estelita Gates    Procedure Summary       Date: 06/25/25 Room / Location: [place] [address] / SURGERY [facility]    Anesthesia Start: 0904 Anesthesia Stop: 1217    Procedures:       ROBOTIC ASSISTED LAPAROSCOPIC SUPRACERVICAL HYSTERECTOMY, BILATERAL SALPINGECTOMY, SACROCOLPOPEXY, POSTERIOR REPAIR/PERINEORRHAPHY, cystoscopy (Pelvis)      SACROCOLPOPEXY, ROBOT ASSISTED USING DV5 (Pelvis) Diagnosis: (INCOMPLETE UTEROVAGINAL PROLAPSE, CYSTOCELE LATERAL, RECTOCELE, STRESS URINARY INCONTINENCE)    Surgeons: Pranav Mohan M.D. Responsible Provider: Ivon Garrett M.D.    Anesthesia Type: general ASA Status: 2            Final Anesthesia Type: general  Last vitals  BP   Blood Pressure: 126/69    Temp   36.3 °C (97.3 °F)    Pulse   85   Resp   14    SpO2   93 %      Anesthesia Post Evaluation    Patient location during evaluation: PACU  Patient participation: complete - patient participated  Level of consciousness: awake and alert  Pain score: 2    Airway patency: patent  Anesthetic complications: no  Cardiovascular status: hemodynamically stable  Respiratory status: acceptable  Hydration status: euvolemic    PONV: none          No notable events documented.     Nurse Pain Score: 3 (NPRS)

## 2025-06-25 NOTE — OP REPORT
OPERATIVE REPORT     Name: Estelita Gates    : 1980    MRN: [id number]       PRE-OP DIAGNOSIS:   Incomplete uterovaginal prolapse  Cystocele  Rectocele             POST-OP DIAGNOSIS:   Incomplete uterovaginal prolapse  Cystocele  Rectocele             PROCEDURE:   Robotic-assisted laparoscopic supracervical hysterectomy, bilateral salpingectomy (00233)  Robotic sacrocolpopexy (04068)  Posterior repair, perineorrhaphy (94698)            SURGEON:   Pranav Mohan MD - Primary  Tessa Tineo APRN-RNFA - Assist              ANESTHESIA: General            FINDINGS:       - Exam under anesthesia: Stage 2 prolapse, apical descent to 2cm proximal to the introitus, palpable rectocele and attenuated perineal body. Hyperelastic tissue noted. At the completion of the procedure there was excellent tricompartmental support, no sutures were palpated rectally.    - Laparoscopy: No entry injuries to viscera or vasculature. Normal appearing peritoneal cavity and liver edge without significant adhesions. Normal-appearing small uterus, bilateral fallopian tubes and ovaries. Small phlebolith in the culdesac. Ureters visualized bilaterally through the procedure. Normal survey prior to closure   - Cystourethroscopy: Performed after completion of relevant procedures. Normal appearing urothelium without lesions, masses, sutures, or perforations. Ureteral orifices noted in the normal orthotopic position, with brisk jets of urine bilaterally. Urethra was normal in appearance without evidence of stricture, perforation, or diverticulum.        ESTIMATED BLOOD LOSS: 25 mL            DRAINS: Ford                   SPECIMENS:   ID Type Source Tests Collected by Time Destination   A : Plebolith ( peritoneal cyst) in formalin per Surgeon Cyst Cyst PATHOLOGY SPECIMEN Pranav Mohan M.D. 2025 10:02 AM    B : Uterus, Bilateral Tubes Tissue Uterus PATHOLOGY SPECIMEN Pranav Mohan M.D. 2025 11:28 AM                IMPLANTS:   Implant  Name Type Inv. Item Serial No.  Lot No. LRB No. Used Action   MESH SURGICAL UPSYLON 35.4CM (1EA) Mesh MESH SURGICAL UPSYLON 35.4CM (1EA)  [employer] [id number]  1 Implanted               COMPLICATIONS: None            DISPOSITION: Discharge            CONDITION: Stable            INDICATION FOR SURGERY:     Estelita Gates is a 44 y.o. year old P2 with symptomatic stage II uterovaginal prolapse, outlet dysfunction constipation, fecal staining. We repeated surgical counseling, specifically focusing on native tissue versus mesh augmented options, for her, I would recommend either a native tissue hysteropexy with repairs, performed laparoscopically, or hysterectomy with sacrocolpopexy.  The benefits and drawbacks of each treatment were reviewed with her including the risks and benefits of surgical mesh.  After detailed counseling about all prolapse treatment options and shared decision-making, she opts for a mesh augmented reconstructive approach to prolapse repair via robotic assisted laparoscopic supracervical hysterectomy, bilateral salpingectomy, sacrocolpopexy, posterior repair/perineorrhaphy, and all indicated procedures. Pre-operative cystometrogram did not demonstrate stress urinary incontinence with reduction of prolapse and she was counseled against a concomitant midurethral sling. Benefits of surgery were reviewed, including functional outcomes (bladder/bowel/sexual). Risks of surgery were also discussed including anesthesia, bleeding, infection, damage to surrounding organs (bladder, ureter, urethra, bowel, blood vessel, nerves), possible blood transfusion, recurrent prolapse, transient buttock pain if sacrospinous suspension performed, mesh exposure/erosion, transient voiding dysfunction requiring catheterization, new/worsening urinary incontinence, pain with sex. She will not be able to become pregnant after a hysterectomy. There is some increased surgical risk with her h/o hernia  repair due to possible adhesions, if severe adhesions encountered the approach to surgery may change. All questions were answered and consent was signed and witnessed.        TECHNIQUE:    I spoke with the patient in the preoperative holding area, where again risks, benefits, indications, and alternatives were reviewed and informed consent was obtained.  She was then transferred to the operative suite, where she was identified, procedure was confirmed.  She was placed in dorsal supine position, given general endotracheal anesthesia without difficulty.  She was then placed in a dorsal lithotomy position  in yellowfin stirrups with all pressure points padded.  She was prepared and draped in usual sterile fashion with arms tucked and all pressure points padded.  An appropriate time-out was performed, where patient was identified, procedure was confirmed, and the operative team was introduced.  It was also confirmed that patient did receive cefazolin 2 g IV for prophylaxis, and she also received DVT prophylaxis with sequential compression devices bilaterally throughout the case.  At this time, exam under anesthesia revealed findings noted above.   A 16-Citizen of [place] Ford catheter was then placed in the patient's  bladder for drainage throughout the procedure.  The anterior lip of the cervix was visualized with a speculum and grasped with a single-toothed tenaculum. The uterus was sounded to a depth of 5cm, and a medium V-care uterine manipulator was placed easily, and the balloon inflated. The handle was covered with a sterile towel.     At this time, attention was turned to the patient's abdomen. After infiltration with marcaine, a 2mm punc incision was made at House's point, just inferior to the left midclavicular line. A veress needle was carefully advanced, and after 3 audible clicks, the gas was attached with an opening pressure of 3 mmHg and peritoneal insufflation  was established to a pressure of 15mmHg. Next, an 8mm  supraumbilical incision was made. Direct optiview entry technique was utilized. The 5mm laparoscope was placed into an 8mm robotic clear-tipped blunt trocar. While the assistant tented up the abdominal wall, the trocar was slowly advanced, visualizing each layer of entry, until the peritoneal cavity was entered. The trocar was removed and the camera  inserted and a full survey was performed with the above findings noted. No entry injuries to bowl, omentum, mesentery or vasculature were visualized.  At this time, on the patient's right lateral side, at approximately 8 cm and 16cm lateral to the umbilicus, two additional 8 mm incisions were made after injection with marcaine, and an additional two 8 mm robotic trocars were then advanced under direct visualization of the laparoscope without difficulty.  On the patient's left hand side, two additional robotic ports were placed at 8cm and 16cm lateral to the umbilicus after infiltration of Marcaine, under direct visualization with the laparoscope. Excellent hemostasis was noted at all port sites. At this time, the patient was placed in steep Trendelenburg. The Cinecorei Xi robot was then brought in for docking, and instruments were placed under direct visualization.     We then proceeded with the robotic assisted laparoscopic supracervical hysterectomy and bilateral salpingectomy in the following fashion: The right adnexa was visualized and the infundibulopelvic ligament was found to be remote from the peristalsing right ureter at the pelvic brim. The right fallopian tube was dissected from the surrounding tissue.  Dissection was then carried down through the utero-ovarian ligament, along the broad ligament to the round ligament which was cauterized and transected. The round ligament on medial traction, the posterior leaflet of the broad ligament was then dissected further down to the level of the internal cervical os, lateralizing the ureter away from the cervix.  Attention was then turned anteriorly where the vesicouterine peritoneum was dissected away from the uterus to create a bladder flap, to the level below the uterine manipulator cup. Uterine vessels were then carefully skeletonized and cauterized and transected perpendicularly at the level of the internal cervical os with excellent hemostasis noted. Attention was then turned to the contralateral side of the uterus where an identical dissection was performed starting with the ovarian tube and utero-ovarian ligament, through the round ligament creating a bladder flap and finally cauterizing and transecting the uterine vessels. The cervix was then transected perpendicularly at the level of the internal os using monopolar electrocautery with excellent hemostasis noted. The specimen was then grasped and placed into an endocatch bag for retrieval at the end of the case. All pedicles were then inspected and found to be hemostatic at this time.    Attention was then turned to the robotic assisted laparoscopic sacrocervicopexy  which proceed in the following fashion: The sigmoid colon was retracted medially with the third arm and the sacral promontory was visualized. The peritoneum overlying the sacral promontory was then lifted and entered sharply with cold scissors. Gentle blunt and sharp dissection was then carried down through presacral adipose tissue to expose the anterior longitudinal ligament of the sacrum. Minimal electrocautery was used and excellent hemostasis was noted. Two interrupted GoreTex sutures were then placed superficially through the anterior longitudinal ligament of the sacrum just inferior to the L5 disc. These were then tucked out of the way for future use in mesh attachment.  A retroperitoneal tunnel for future covering of the mesh was then created using the monopolar scissors traveling in the right pararectal space and exiting at a position just medial to the proximal right uterosacral ligament.  Attention was then turned to the anterior dissection. The cervix was grasped and placed under tension and the bladder was further dissected away from the vagina through the avascular vesicovaginal space down to the level of the trigone, which was identified with gentle traction on the Ford balloon. Attention was then turned posteriorly where again traction was placed on the cervix, and the peritoneum was entered midway down the vagina entering the avascular retrorectal space and dissected down to the level of the perineal body with excellent hemostasis noted. The Upsylon Y-mesh, which had been previously trimmed to the patient's measurements, was then brought into the operative field, and affixed to the cervical stroma using 3 Alligator-Jose C sutures anteriorly and 3 Alligator-Jose C sutures posteriorly. Attention was then turned posteriorly where the remainder of the posterior leaf of the mesh was attached to the posterior vaginal muscularis using interrupted 2-0 vicryl sutures with excellent approximation of the mesh to the posterior vaginal tissue without any bunching. Attention was then turned to the anterior leaflet which is also attached to the anterior vaginal muscularis using interrupted 2-0 vicryl sutures  with excellent approximation of the mesh to the anterior vaginal tissue without any bunching. The Y portion of the mesh was then brought through the previously created retroperitoneal tunnel to the level of the sacrum. The mesh was then tensioned robotically and I personally examined for correct tensioning with a vaginal examination. The Y-portion of the mesh was then affixed to the sacral promontory the previously placed GoreTex sutures.  A third Gortex suture was placed to let the mesh flat against the sacral promontory.  Excess mesh was then trimmed and removed from the operative field. Excellent hemostasis was noted at the sacrum. The peritoneum overlying the sacrum was then closed using a 2-0 monocryl stratafix  suture. The remaining peritoneum was then reapproximated in anterior to posterior fashion using a 2-0 Monocryl stratafix suture, completely covering and retroperitonealized the mesh without visible defects that would allow for viscera to herniate.     Cystourethroscopy was then performed. The Ford catheter was removed and a 70 degree cystoscope was placed into the bladder under direct visualization and the bladder was backfilled with sterile water. A 360 degree survey of the bladder was then performed with the above findings noted, no sutures, mesh or urothelial defects visualized, and excellent bilateral ureteral efflux with pyridium-stained urine. The bladder was drained of all cystoscopic fluid, the cystoscope removed, and the Ford catheter replaced.      All instruments were then removed and the robot undocked. The previously bagged specimen was brought through the umbilical incision, removed with in-bag morcellation and sent for pathology. All trocars were then removed under direct visualization and the umbilical fascia was closed using a 0-vicryl interrupted suture, and skin closed at all sites with 4-0 monocryl subcuticular suture and dermabond. Excellent hemostasis was noted.      Attention was then turned posteriorly.  There was a palpable distal rectocele and attenuated perineal body. Hyperelastic tissue noted when retracting.  The posterior repair and perineorrhaphy then proceed in the following fashion: Allis clamps were placed on the posterior introitus in a triangle-shaped fashion on the posterior introitus with care not to over narrow the vaginal introitus.  The area was then infiltrated with 1 % lidocaine with dilute epinephrine.  A triangle-shaped incision was then made over the posterior vaginal wall and the perineal skin with a scalpel.  Skin was then sharply dissected from the underlying central tendon in the perineal body and the rectovaginal fascia out to the level of the levator fascia  bilaterally and to the level of prior proximal mesh dissection without entering the peritoneum.  The rectocele was then plicated in the midline with 2-0 PDS stratafix in a running layer followed by 2-0 vicryl interrupted sutures with care to approximate the distal rectovaginal fascia to the central tendon of the perineal body. The perineorrhaphy was then performed by placing a series of five 0 Vicryl sutures to rebuild the appropriate anatomic shelf  - each suture placed through the transverse perineal muscles from proximal to distal, with the final suture incorporating the central tendon of the perineal body. Perineorrhaphy suture were crossed and found not to narrow the introitus. The posterior vaginal epithelium was then trimmed.  The epithelium was then closed with a 2-0 Vicryl suture in a running locked fashion with care to incorporate part of the rectovaginal fascia to obliterate the dead space. A small amount of surgiflo was placed to aid in venous hemostasis.  The perineorrhaphy sutures were tied serially.  There was good lengthening of the perineal body with an adequate genital hiatus diameter.  The 2-0 Vicryl mucosal sutures then continued in a running fashion to the hymenal ring, which was then tied.   Another rectal exam was then performed with no sutures palpated.   Hemostasis was noted at the completion of the procedure.    All counts were correct.  The patient was then wakened from general anesthesia easily, and brought to the PACU in stable condition. Anticipate discharge home later today.         Pranav Mohan MD, FACOG  Urogynecology and Reconstructive Pelvic Surgery  Department of Obstetrics and Gynecology  [facility]

## 2025-06-26 ENCOUNTER — RESULTS FOLLOW-UP (OUTPATIENT)
Dept: OBGYN | Facility: CLINIC | Age: 45
End: 2025-06-26
Payer: COMMERCIAL

## 2025-06-26 ENCOUNTER — TELEPHONE (OUTPATIENT)
Dept: GYNECOLOGY | Facility: CLINIC | Age: 45
End: 2025-06-26
Payer: COMMERCIAL

## 2025-06-26 DIAGNOSIS — N39.46 URINARY INCONTINENCE, MIXED: Primary | ICD-10-CM

## 2025-06-26 DIAGNOSIS — R39.9 UTI SYMPTOMS: ICD-10-CM

## 2025-06-26 NOTE — TELEPHONE ENCOUNTER
Post-operative phone call    I called Ms. Gatse and confirmed her identity. She is POD1 s/p Robotic-assisted laparoscopic supracervical hysterectomy, bilateral salpingectomy. Robotic sacrocolpopexy. Posterior repair, perineorrhaphy.     She reports doing well, pain controlled, ambulating, tolerating regular diet, passing gas, not having bowel movement yet and is using Miralax, and is emptying her bladder well. She denies nausea, vomiting, fever, chills, SOB, or heavy vaginal bleeding.   All questions answered. Advised patient to reach out via [employer] or call the office at [phone] should she have any questions or concerns prior to f/u.     She will follow up as scheduled on 8/7/25 with Dr. Mohan, or earlier if any issues arise.      YAMINI Ballard, RNFA

## 2025-06-26 NOTE — OR NURSING
Pt to Phase II in good condition. Pt states pain is tolerable, denies nausea. Sites to abdomen CDI. Backfill with 300cc complete per orders. Ford removed and pt ambulated to the bathroom. Pt voided all 300cc into hat. Discharge instructions reviewed with pt and family who verbalize understanding.

## 2025-07-01 ENCOUNTER — APPOINTMENT (OUTPATIENT)
Dept: NEUROLOGY | Facility: MEDICAL CENTER | Age: 45
End: 2025-07-01
Attending: PSYCHIATRY & NEUROLOGY
Payer: COMMERCIAL

## 2025-07-03 ENCOUNTER — RESEARCH ENCOUNTER (OUTPATIENT)
Dept: SLEEP MEDICINE | Facility: MEDICAL CENTER | Age: 45
End: 2025-07-03
Payer: COMMERCIAL

## 2025-07-07 ENCOUNTER — DOCUMENTATION (OUTPATIENT)
Dept: NEUROLOGY | Facility: MEDICAL CENTER | Age: 45
End: 2025-07-07
Payer: COMMERCIAL

## 2025-07-07 ENCOUNTER — TELEPHONE (OUTPATIENT)
Dept: PHARMACY | Facility: MEDICAL CENTER | Age: 45
End: 2025-07-07
Payer: COMMERCIAL

## 2025-07-07 DIAGNOSIS — G43.709 CHRONIC MIGRAINE WITHOUT AURA WITHOUT STATUS MIGRAINOSUS, NOT INTRACTABLE: Primary | ICD-10-CM

## 2025-07-07 NOTE — TELEPHONE ENCOUNTER
Prior Authorization for Ubrogepant 100 MG Tab  (Quantity: 8, Days: 30) has been submitted via Cover My Meds: Key ([id number])    Insurance: [employer]     Will follow up in 24-48 business hours.

## 2025-07-07 NOTE — TELEPHONE ENCOUNTER
Received Renewal PA request via [employer]  for Zavegepant HCl (ZAVZPRET) 10 MG/ACT Solution. (Quantity:6, Day Supply:30)     Insurance: [facility] [employer]  Member ID:  [id number]  BIN: [id number]  PCN: [id number]  Group: [id number]    Ran Test claim via [employer] & medication Rejects stating prior authorization is required.

## 2025-07-07 NOTE — TELEPHONE ENCOUNTER
Prior Authorization for Ubrogepant 100 MG Tab  has been approved for a quantity of 8 , day supply 30    Prior Authorization reference number: [id number]  Insurance: [facility]  Effective dates: 7/7/25 to 7/7/27  Copay: $0     Is patient eligible to fill with [facility]? Yes    Next Steps: The Patients copay is less than $5.00. Will contact the patient to determine choice of pharmacy, if applicable.

## 2025-07-07 NOTE — TELEPHONE ENCOUNTER
Received New Start PA request via MSOT  for   Ubrogepant 100 MG Tab. (Quantity:8, Day Supply:30)     Insurance: [facility]  Member ID:  [id number]  BIN: [id number]  PCN: [id number]  Group: [id number]     Ran Test claim via [employer] & medication Rejects stating prior authorization is required.

## 2025-07-07 NOTE — TELEPHONE ENCOUNTER
Prior Authorization for Zavegepant HCl (ZAVZPRET) 10 MG/ACT Solution  has been approved for a quantity of 6 , day supply 30    Prior Authorization reference number: [id number]  Insurance: [facility] [employer]  Effective dates: 7/7/25 to 7/7/27  Copay: $0     Is patient eligible to fill with [facility]? Yes    Next Steps: The Patients copay is less than $5.00. Will contact the patient to determine choice of pharmacy, if applicable.

## 2025-07-07 NOTE — TELEPHONE ENCOUNTER
Prior Authorization for Zavegepant HCl (ZAVZPRET) 10 MG/ACT Solution  (Quantity: 6, Days: 30) has been submitted via Cover My Meds: Key ([id number])    Insurance: [facility] Personal Care    Will follow up in 24-48 business hours.

## 2025-07-10 DIAGNOSIS — G43.709 CHRONIC MIGRAINE WITHOUT AURA WITHOUT STATUS MIGRAINOSUS, NOT INTRACTABLE: ICD-10-CM

## 2025-07-10 PROCEDURE — RXMED WILLOW AMBULATORY MEDICATION CHARGE: Performed by: PSYCHIATRY & NEUROLOGY

## 2025-07-10 RX ORDER — SULFAMETHOXAZOLE AND TRIMETHOPRIM 800; 160 MG/1; MG/1
1 TABLET ORAL 2 TIMES DAILY
Qty: 6 TABLET | Refills: 0 | Status: SHIPPED | OUTPATIENT
Start: 2025-07-10 | End: 2025-07-11 | Stop reason: SDUPTHER

## 2025-07-10 NOTE — TELEPHONE ENCOUNTER
Called and spoke to patient and let her know the prior auth is approved. Pt would like to onboard and get the medication filled and sent out for delivery from the [facility] pharmacy.

## 2025-07-10 NOTE — TELEPHONE ENCOUNTER
Please see note    Can we get a new rx sent to the [facility] pharmacy? For some reason the rx on file at the [facility] pharmacy says the hardcopy is missing. Pt would like to fill and have her medication delivered from the [facility] pharmacy.

## 2025-07-11 ENCOUNTER — HOSPITAL ENCOUNTER (OUTPATIENT)
Dept: LAB | Facility: MEDICAL CENTER | Age: 45
End: 2025-07-11
Attending: STUDENT IN AN ORGANIZED HEALTH CARE EDUCATION/TRAINING PROGRAM
Payer: COMMERCIAL

## 2025-07-11 DIAGNOSIS — R39.9 UTI SYMPTOMS: ICD-10-CM

## 2025-07-11 PROCEDURE — RXMED WILLOW AMBULATORY MEDICATION CHARGE: Performed by: PSYCHIATRY & NEUROLOGY

## 2025-07-11 PROCEDURE — 87086 URINE CULTURE/COLONY COUNT: CPT

## 2025-07-11 RX ORDER — SULFAMETHOXAZOLE AND TRIMETHOPRIM 800; 160 MG/1; MG/1
1 TABLET ORAL 2 TIMES DAILY
Qty: 6 TABLET | Refills: 0 | Status: SHIPPED | OUTPATIENT
Start: 2025-07-11 | End: 2025-07-14

## 2025-07-11 RX ORDER — ZAVEGEPANT 10 MG/.1ML
SPRAY NASAL
Qty: 6 EACH | Refills: 11 | Status: SHIPPED | OUTPATIENT
Start: 2025-07-11 | End: 2025-07-29 | Stop reason: SDUPTHER

## 2025-07-14 ENCOUNTER — PHARMACY VISIT (OUTPATIENT)
Dept: PHARMACY | Facility: MEDICAL CENTER | Age: 45
End: 2025-07-14
Payer: COMMERCIAL

## 2025-07-14 ENCOUNTER — APPOINTMENT (OUTPATIENT)
Dept: NEUROLOGY | Facility: MEDICAL CENTER | Age: 45
End: 2025-07-14
Attending: PSYCHIATRY & NEUROLOGY
Payer: COMMERCIAL

## 2025-07-14 LAB
BACTERIA UR CULT: NORMAL
SIGNIFICANT IND 70042: NORMAL
SITE SITE: NORMAL
SOURCE SOURCE: NORMAL

## 2025-07-15 ENCOUNTER — OFFICE VISIT (OUTPATIENT)
Dept: NEUROLOGY | Facility: MEDICAL CENTER | Age: 45
End: 2025-07-15
Attending: PSYCHIATRY & NEUROLOGY
Payer: COMMERCIAL

## 2025-07-15 VITALS
SYSTOLIC BLOOD PRESSURE: 112 MMHG | TEMPERATURE: 97.9 F | DIASTOLIC BLOOD PRESSURE: 76 MMHG | HEART RATE: 82 BPM | WEIGHT: 146.16 LBS | BODY MASS INDEX: 24.35 KG/M2 | HEIGHT: 65 IN | RESPIRATION RATE: 16 BRPM | OXYGEN SATURATION: 98 %

## 2025-07-15 DIAGNOSIS — G43.709 CHRONIC MIGRAINE WITHOUT AURA WITHOUT STATUS MIGRAINOSUS, NOT INTRACTABLE: Primary | ICD-10-CM

## 2025-07-15 PROCEDURE — 3078F DIAST BP <80 MM HG: CPT | Performed by: PSYCHIATRY & NEUROLOGY

## 2025-07-15 PROCEDURE — 700101 HCHG RX REV CODE 250

## 2025-07-15 PROCEDURE — 700111 HCHG RX REV CODE 636 W/ 250 OVERRIDE (IP)

## 2025-07-15 PROCEDURE — 64615 CHEMODENERV MUSC MIGRAINE: CPT | Performed by: PSYCHIATRY & NEUROLOGY

## 2025-07-15 PROCEDURE — 3074F SYST BP LT 130 MM HG: CPT | Performed by: PSYCHIATRY & NEUROLOGY

## 2025-07-15 RX ADMIN — SODIUM CHLORIDE 155 UNITS: 9 INJECTION, SOLUTION INTRAMUSCULAR; INTRAVENOUS; SUBCUTANEOUS at 15:04

## 2025-07-15 ASSESSMENT — PATIENT HEALTH QUESTIONNAIRE - PHQ9: CLINICAL INTERPRETATION OF PHQ2 SCORE: 0

## 2025-07-15 NOTE — PROGRESS NOTES
[facility]  BOTOX PROCEDURE NOTE    Chronic Migraine:  Botox therapy has reduced patient’s migraines by more than 7 days and/or 100 hours per month.     I treated Estelita Gates in clinic today with BotoxA 155 units according to the dosing/injection paradigm currently mandated by the FDA for the management of chronic migraine.  Specifically, I injected:  - 5 units to the procerus,  - 5 units to the corrugators (bilaterally),  - a total of 20 units to the frontalis musculature,  - 20 units to the temporalis (bilaterally),  - 15 units to the occipitalis (bilaterally),  - 10 units to the cervical paraspinals (bilaterally), and  - 15 units to the trapezius musculature (bilaterally).    The remainder of the Botox was discarded as wastage per FDA guidelines  Consent on file.  Patient identify verified with 2 patient identifiers.     Frequency of headaches is >15 days monthly with at least 8 migraines monthly.    Migraines include at least two of the following: worsened with activity or avoidance of activity with migraines (ie they go lie down), moderate to severe pain intensity, pulsing headache, unilateral headache and has  have either nausea or vomiting OR sensitivity to light and sound.     Although Estelita Gates is responding to botox s/he is NOT migraine free.  I recommend that Botox be continued at this time.    Estelita Gates has chronic migraines, defined as having 15 or more headaches days per month, 8 of which are migraines, over a minimum of the last three months.  Episodes last more than 4 hours (untreated).  Pt has 2 or more of following (see initial note):  - headache worsened with activity  - pain is moderate to severe intensity  - pulsing in nature  - unilateral   and patient either has nausea/vomiting OR sensitivity to light and sound.    No adverse effect of Botox noted at conclusion of today's appointment.    Follow up in 12 weeks for Botox or sooner if needed.    Signed: Willis  PAOLO Jones M.D.   Lab Facility: [id number] Lab Facility: [id number]

## 2025-07-21 ENCOUNTER — OFFICE VISIT (OUTPATIENT)
Dept: NEUROLOGY | Facility: MEDICAL CENTER | Age: 45
End: 2025-07-21
Attending: PSYCHIATRY & NEUROLOGY
Payer: COMMERCIAL

## 2025-07-21 VITALS
BODY MASS INDEX: 24.06 KG/M2 | SYSTOLIC BLOOD PRESSURE: 118 MMHG | HEART RATE: 83 BPM | DIASTOLIC BLOOD PRESSURE: 72 MMHG | OXYGEN SATURATION: 98 % | WEIGHT: 144.4 LBS | RESPIRATION RATE: 16 BRPM | TEMPERATURE: 97.3 F | HEIGHT: 65 IN

## 2025-07-21 DIAGNOSIS — G43.901 STATUS MIGRAINOSUS: ICD-10-CM

## 2025-07-21 DIAGNOSIS — G43.001 MIGRAINE WITHOUT AURA AND WITH STATUS MIGRAINOSUS, NOT INTRACTABLE: Primary | ICD-10-CM

## 2025-07-21 PROCEDURE — 99213 OFFICE O/P EST LOW 20 MIN: CPT | Performed by: PSYCHIATRY & NEUROLOGY

## 2025-07-21 PROCEDURE — 700111 HCHG RX REV CODE 636 W/ 250 OVERRIDE (IP): Mod: JZ

## 2025-07-21 PROCEDURE — 96372 THER/PROPH/DIAG INJ SC/IM: CPT | Performed by: PSYCHIATRY & NEUROLOGY

## 2025-07-21 PROCEDURE — 3074F SYST BP LT 130 MM HG: CPT | Performed by: PSYCHIATRY & NEUROLOGY

## 2025-07-21 PROCEDURE — 99213 OFFICE O/P EST LOW 20 MIN: CPT | Mod: 24 | Performed by: PSYCHIATRY & NEUROLOGY

## 2025-07-21 PROCEDURE — 3078F DIAST BP <80 MM HG: CPT | Performed by: PSYCHIATRY & NEUROLOGY

## 2025-07-21 RX ORDER — DEXAMETHASONE 2 MG/1
TABLET ORAL
Qty: 10 TABLET | Refills: 0 | Status: SHIPPED | OUTPATIENT
Start: 2025-07-21 | End: 2025-07-25

## 2025-07-21 RX ORDER — DIPHENHYDRAMINE HYDROCHLORIDE 50 MG/ML
25 INJECTION, SOLUTION INTRAMUSCULAR; INTRAVENOUS PRN
OUTPATIENT
Start: 2025-07-22

## 2025-07-21 RX ORDER — EPINEPHRINE 1 MG/ML(1)
0.5 AMPUL (ML) INJECTION PRN
OUTPATIENT
Start: 2025-07-22

## 2025-07-21 RX ORDER — 0.9 % SODIUM CHLORIDE 0.9 %
VIAL (ML) INJECTION PRN
OUTPATIENT
Start: 2025-07-22

## 2025-07-21 RX ORDER — ONDANSETRON 2 MG/ML
8 INJECTION INTRAMUSCULAR; INTRAVENOUS PRN
OUTPATIENT
Start: 2025-07-22

## 2025-07-21 RX ORDER — 0.9 % SODIUM CHLORIDE 0.9 %
10 VIAL (ML) INJECTION PRN
OUTPATIENT
Start: 2025-07-22

## 2025-07-21 RX ORDER — METHYLPREDNISOLONE SODIUM SUCCINATE 125 MG/2ML
125 INJECTION, POWDER, LYOPHILIZED, FOR SOLUTION INTRAMUSCULAR; INTRAVENOUS PRN
OUTPATIENT
Start: 2025-07-22

## 2025-07-21 RX ORDER — ACETAMINOPHEN 325 MG/1
650 TABLET ORAL PRN
OUTPATIENT
Start: 2025-07-22

## 2025-07-21 RX ORDER — MEPERIDINE HYDROCHLORIDE 25 MG/ML
25 INJECTION INTRAMUSCULAR; INTRAVENOUS; SUBCUTANEOUS PRN
OUTPATIENT
Start: 2025-07-22

## 2025-07-21 RX ORDER — LORAZEPAM 0.5 MG/1
0.5 TABLET ORAL PRN
OUTPATIENT
Start: 2025-07-22

## 2025-07-21 RX ORDER — ALBUTEROL SULFATE 5 MG/ML
2.5 SOLUTION RESPIRATORY (INHALATION) PRN
OUTPATIENT
Start: 2025-07-22

## 2025-07-21 RX ORDER — LORAZEPAM 2 MG/ML
0.5 INJECTION INTRAMUSCULAR PRN
OUTPATIENT
Start: 2025-07-22

## 2025-07-21 RX ORDER — ONDANSETRON 8 MG/1
8 TABLET, ORALLY DISINTEGRATING ORAL PRN
OUTPATIENT
Start: 2025-07-22

## 2025-07-21 RX ORDER — SODIUM CHLORIDE 9 MG/ML
INJECTION, SOLUTION INTRAVENOUS CONTINUOUS
OUTPATIENT
Start: 2025-07-22

## 2025-07-21 RX ORDER — SODIUM CHLORIDE 9 MG/ML
1000 INJECTION, SOLUTION INTRAVENOUS PRN
OUTPATIENT
Start: 2025-07-22

## 2025-07-21 RX ORDER — 0.9 % SODIUM CHLORIDE 0.9 %
3 VIAL (ML) INJECTION PRN
OUTPATIENT
Start: 2025-07-22

## 2025-07-21 RX ORDER — KETOROLAC TROMETHAMINE 30 MG/ML
60 INJECTION, SOLUTION INTRAMUSCULAR; INTRAVENOUS ONCE
Status: COMPLETED | OUTPATIENT
Start: 2025-07-21 | End: 2025-07-21

## 2025-07-21 RX ADMIN — KETOROLAC TROMETHAMINE 60 MG: 30 INJECTION, SOLUTION INTRAMUSCULAR at 16:26

## 2025-07-21 ASSESSMENT — PATIENT HEALTH QUESTIONNAIRE - PHQ9: CLINICAL INTERPRETATION OF PHQ2 SCORE: 0

## 2025-07-22 ENCOUNTER — PATIENT MESSAGE (OUTPATIENT)
Dept: NEUROLOGY | Facility: MEDICAL CENTER | Age: 45
End: 2025-07-22
Payer: COMMERCIAL

## 2025-07-23 ENCOUNTER — PATIENT MESSAGE (OUTPATIENT)
Dept: NEUROLOGY | Facility: MEDICAL CENTER | Age: 45
End: 2025-07-23
Payer: COMMERCIAL

## 2025-07-28 ENCOUNTER — PATIENT MESSAGE (OUTPATIENT)
Dept: NEUROLOGY | Facility: MEDICAL CENTER | Age: 45
End: 2025-07-28
Payer: COMMERCIAL

## 2025-07-29 ENCOUNTER — PATIENT MESSAGE (OUTPATIENT)
Dept: NEUROLOGY | Facility: MEDICAL CENTER | Age: 45
End: 2025-07-29
Payer: COMMERCIAL

## 2025-08-04 ENCOUNTER — PATIENT MESSAGE (OUTPATIENT)
Dept: NEUROLOGY | Facility: MEDICAL CENTER | Age: 45
End: 2025-08-04
Payer: COMMERCIAL

## 2025-08-04 DIAGNOSIS — G43.709 CHRONIC MIGRAINE WITHOUT AURA WITHOUT STATUS MIGRAINOSUS, NOT INTRACTABLE: Primary | ICD-10-CM

## 2025-08-04 RX ORDER — ELETRIPTAN HYDROBROMIDE 40 MG/1
TABLET, FILM COATED ORAL
Qty: 12 TABLET | Refills: 11 | Status: SHIPPED | OUTPATIENT
Start: 2025-08-04

## 2025-08-06 PROCEDURE — RXMED WILLOW AMBULATORY MEDICATION CHARGE: Performed by: PSYCHIATRY & NEUROLOGY

## 2025-08-07 ENCOUNTER — GYNECOLOGY VISIT (OUTPATIENT)
Dept: GYNECOLOGY | Facility: CLINIC | Age: 45
End: 2025-08-07
Payer: COMMERCIAL

## 2025-08-07 VITALS — HEART RATE: 86 BPM | SYSTOLIC BLOOD PRESSURE: 125 MMHG | DIASTOLIC BLOOD PRESSURE: 85 MMHG

## 2025-08-07 DIAGNOSIS — Z98.890 POSTOPERATIVE STATE: Primary | ICD-10-CM

## 2025-08-07 PROBLEM — N81.11 CYSTOCELE, MIDLINE: Status: RESOLVED | Noted: 2023-05-04 | Resolved: 2025-08-07

## 2025-08-07 PROBLEM — K59.02 OUTLET DYSFUNCTION CONSTIPATION: Status: RESOLVED | Noted: 2023-05-04 | Resolved: 2025-08-07

## 2025-08-07 PROBLEM — N81.81 PERINEOCELE: Status: RESOLVED | Noted: 2023-05-04 | Resolved: 2025-08-07

## 2025-08-07 PROBLEM — N81.2 INCOMPLETE UTEROVAGINAL PROLAPSE: Status: RESOLVED | Noted: 2023-05-04 | Resolved: 2025-08-07

## 2025-08-07 PROBLEM — N81.6 RECTOCELE: Status: RESOLVED | Noted: 2023-05-04 | Resolved: 2025-08-07

## 2025-08-07 PROCEDURE — 99024 POSTOP FOLLOW-UP VISIT: CPT | Performed by: STUDENT IN AN ORGANIZED HEALTH CARE EDUCATION/TRAINING PROGRAM

## 2025-08-07 PROCEDURE — 3074F SYST BP LT 130 MM HG: CPT | Performed by: STUDENT IN AN ORGANIZED HEALTH CARE EDUCATION/TRAINING PROGRAM

## 2025-08-07 PROCEDURE — 3079F DIAST BP 80-89 MM HG: CPT | Performed by: STUDENT IN AN ORGANIZED HEALTH CARE EDUCATION/TRAINING PROGRAM

## 2025-08-08 ENCOUNTER — SPECIALTY PHARMACY (OUTPATIENT)
Dept: PHARMACY | Facility: MEDICAL CENTER | Age: 45
End: 2025-08-08
Payer: COMMERCIAL

## 2025-08-08 ENCOUNTER — PHARMACY VISIT (OUTPATIENT)
Dept: PHARMACY | Facility: MEDICAL CENTER | Age: 45
End: 2025-08-08
Payer: COMMERCIAL

## 2025-08-19 DIAGNOSIS — G43.901 STATUS MIGRAINOSUS: Primary | ICD-10-CM

## 2025-08-19 RX ORDER — DEXAMETHASONE 2 MG/1
TABLET ORAL
Qty: 10 TABLET | Refills: 0 | Status: SHIPPED | OUTPATIENT
Start: 2025-08-19 | End: 2025-08-23

## 2025-08-21 ENCOUNTER — DOCUMENTATION (OUTPATIENT)
Dept: NEUROLOGY | Facility: MEDICAL CENTER | Age: 45
End: 2025-08-21
Payer: COMMERCIAL

## 2025-08-29 ENCOUNTER — HOSPITAL ENCOUNTER (OUTPATIENT)
Dept: LAB | Facility: MEDICAL CENTER | Age: 45
End: 2025-08-29
Attending: FAMILY MEDICINE
Payer: COMMERCIAL

## 2025-08-29 LAB
25(OH)D3 SERPL-MCNC: 41 NG/ML (ref 30–100)
ALBUMIN SERPL BCP-MCNC: 4.3 G/DL (ref 3.2–4.9)
ALBUMIN/GLOB SERPL: 1.9 G/DL
ALP SERPL-CCNC: 50 U/L (ref 30–99)
ALT SERPL-CCNC: 22 U/L (ref 2–50)
ANION GAP SERPL CALC-SCNC: 11 MMOL/L (ref 7–16)
AST SERPL-CCNC: 21 U/L (ref 12–45)
BASOPHILS # BLD AUTO: 0.6 % (ref 0–1.8)
BASOPHILS # BLD: 0.04 K/UL (ref 0–0.12)
BILIRUB SERPL-MCNC: 0.3 MG/DL (ref 0.1–1.5)
BUN SERPL-MCNC: 15 MG/DL (ref 8–22)
CALCIUM ALBUM COR SERPL-MCNC: 9 MG/DL (ref 8.5–10.5)
CALCIUM SERPL-MCNC: 9.2 MG/DL (ref 8.5–10.5)
CHLORIDE SERPL-SCNC: 105 MMOL/L (ref 96–112)
CO2 SERPL-SCNC: 25 MMOL/L (ref 20–33)
CORTIS SERPL-MCNC: 9 UG/DL (ref 0–23)
CREAT SERPL-MCNC: 0.87 MG/DL (ref 0.5–1.4)
CRP SERPL HS-MCNC: <0.3 MG/DL (ref 0–0.75)
EOSINOPHIL # BLD AUTO: 0.07 K/UL (ref 0–0.51)
EOSINOPHIL NFR BLD: 1 % (ref 0–6.9)
ERYTHROCYTE [DISTWIDTH] IN BLOOD BY AUTOMATED COUNT: 43.8 FL (ref 35.9–50)
ERYTHROCYTE [SEDIMENTATION RATE] IN BLOOD BY WESTERGREN METHOD: 4 MM/HOUR (ref 0–25)
ESTRADIOL SERPL-MCNC: 76 PG/ML
FSH SERPL-ACNC: 6.4 MIU/ML
GFR SERPLBLD CREATININE-BSD FMLA CKD-EPI: 84 ML/MIN/1.73 M 2
GLOBULIN SER CALC-MCNC: 2.3 G/DL (ref 1.9–3.5)
GLUCOSE SERPL-MCNC: 73 MG/DL (ref 65–99)
HCT VFR BLD AUTO: 43.4 % (ref 37–47)
HGB BLD-MCNC: 14.6 G/DL (ref 12–16)
IMM GRANULOCYTES # BLD AUTO: 0.05 K/UL (ref 0–0.11)
IMM GRANULOCYTES NFR BLD AUTO: 0.7 % (ref 0–0.9)
LH SERPL-ACNC: 8.1 IU/L
LYMPHOCYTES # BLD AUTO: 2.91 K/UL (ref 1–4.8)
LYMPHOCYTES NFR BLD: 41.2 % (ref 22–41)
MCH RBC QN AUTO: 30.8 PG (ref 27–33)
MCHC RBC AUTO-ENTMCNC: 33.6 G/DL (ref 32.2–35.5)
MCV RBC AUTO: 91.6 FL (ref 81.4–97.8)
MONOCYTES # BLD AUTO: 0.54 K/UL (ref 0–0.85)
MONOCYTES NFR BLD AUTO: 7.6 % (ref 0–13.4)
NEUTROPHILS # BLD AUTO: 3.46 K/UL (ref 1.82–7.42)
NEUTROPHILS NFR BLD: 48.9 % (ref 44–72)
NRBC # BLD AUTO: 0 K/UL
NRBC BLD-RTO: 0 /100 WBC (ref 0–0.2)
PLATELET # BLD AUTO: 238 K/UL (ref 164–446)
PMV BLD AUTO: 12.1 FL (ref 9–12.9)
POTASSIUM SERPL-SCNC: 3.8 MMOL/L (ref 3.6–5.5)
PROGEST SERPL-MCNC: 0.15 NG/ML
PROT SERPL-MCNC: 6.6 G/DL (ref 6–8.2)
RBC # BLD AUTO: 4.74 M/UL (ref 4.2–5.4)
SODIUM SERPL-SCNC: 141 MMOL/L (ref 135–145)
T3 SERPL-MCNC: 95.2 NG/DL (ref 60–181)
T4 SERPL-MCNC: 5.3 UG/DL (ref 4–12)
TSH SERPL-ACNC: 1.48 UIU/ML (ref 0.38–5.33)
VIT B12 SERPL-MCNC: 1540 PG/ML (ref 211–911)
WBC # BLD AUTO: 7.1 K/UL (ref 4.8–10.8)

## 2025-08-29 PROCEDURE — 82533 TOTAL CORTISOL: CPT

## 2025-08-29 PROCEDURE — 86140 C-REACTIVE PROTEIN: CPT

## 2025-08-29 PROCEDURE — 86038 ANTINUCLEAR ANTIBODIES: CPT

## 2025-08-29 PROCEDURE — 84480 ASSAY TRIIODOTHYRONINE (T3): CPT

## 2025-08-29 PROCEDURE — 84436 ASSAY OF TOTAL THYROXINE: CPT

## 2025-08-29 PROCEDURE — 84403 ASSAY OF TOTAL TESTOSTERONE: CPT

## 2025-08-29 PROCEDURE — 82670 ASSAY OF TOTAL ESTRADIOL: CPT

## 2025-08-29 PROCEDURE — 82306 VITAMIN D 25 HYDROXY: CPT

## 2025-08-29 PROCEDURE — 84144 ASSAY OF PROGESTERONE: CPT

## 2025-08-29 PROCEDURE — 83002 ASSAY OF GONADOTROPIN (LH): CPT

## 2025-08-29 PROCEDURE — 36415 COLL VENOUS BLD VENIPUNCTURE: CPT

## 2025-08-29 PROCEDURE — 83001 ASSAY OF GONADOTROPIN (FSH): CPT

## 2025-08-29 PROCEDURE — 82607 VITAMIN B-12: CPT

## 2025-08-29 PROCEDURE — 85025 COMPLETE CBC W/AUTO DIFF WBC: CPT

## 2025-08-29 PROCEDURE — 80053 COMPREHEN METABOLIC PANEL: CPT

## 2025-08-29 PROCEDURE — 85652 RBC SED RATE AUTOMATED: CPT

## 2025-08-29 PROCEDURE — 84443 ASSAY THYROID STIM HORMONE: CPT

## 2025-08-31 LAB — NUCLEAR IGG SER QL IA: NORMAL

## 2025-10-13 ENCOUNTER — APPOINTMENT (OUTPATIENT)
Dept: NEUROLOGY | Facility: MEDICAL CENTER | Age: 45
End: 2025-10-13
Attending: PSYCHIATRY & NEUROLOGY
Payer: COMMERCIAL

## (undated) DEVICE — SYSTEM CLEARIFY VISUALIZATION (10EA/PK)

## (undated) DEVICE — COVER LIGHT HANDLE ALC PLUS DISP (18EA/BX)

## (undated) DEVICE — SUCTION INSTRUMENT YANKAUER BULBOUS TIP W/O VENT (50EA/CA)

## (undated) DEVICE — SEAL UNIVERSAL 5MM-12MM (10EA/BX)

## (undated) DEVICE — GOWN WARMING STANDARD FLEX - (30/CA)

## (undated) DEVICE — BANDAGE ELASTIC LATEX STERILE VELCRO 4 X 5 YDS (25EA/CA)

## (undated) DEVICE — TRAY CATHETER FOLEY URINE METER W/STATLOCK 350ML (10EA/CA)

## (undated) DEVICE — GLOVE SZ 6 BIOGEL PI MICRO - PF LF (50PR/BX 4BX/CA)

## (undated) DEVICE — SENSOR SPO2 NEO LNCS ADHESIVE (20/BX) SEE USER NOTES

## (undated) DEVICE — SET LEADWIRE 5 LEAD BEDSIDE DISPOSABLE ECG (1SET OF 5/EA)

## (undated) DEVICE — CANNULA W/ SUPPLY TUBING O2 - (50/CA)

## (undated) DEVICE — ELECTRODE DUAL RETURN W/ CORD - (50/PK)

## (undated) DEVICE — SENSOR OXIMETER ADULT SPO2 RD SET (20EA/BX)

## (undated) DEVICE — HUMID-VENT HEAT AND MOISTURE EXCHANGE- (50/BX)

## (undated) DEVICE — MASK OXYGEN VNYL ADLT MED CONC WITH 7 FOOT TUBING  - (50EA/CA)

## (undated) DEVICE — Device

## (undated) DEVICE — BLADE SHAVER AGGRESSIVE PLUS 4.0MM ANGLED (5EA/BX)

## (undated) DEVICE — SYRINGE 10 ML CONTROL LL (25EA/BX 4BX/CA)

## (undated) DEVICE — CHLORAPREP 26 ML APPLICATOR - ORANGE TINT(25/CA)

## (undated) DEVICE — TUBE CONNECTING SUCTION - CLEAR PLASTIC STERILE 72 IN (50EA/CA)

## (undated) DEVICE — DRAPE LAPAROTOMY T SHEET - (12EA/CA)

## (undated) DEVICE — SYRINGE STERILE 10 ML LL (200/BX)

## (undated) DEVICE — SUTURE 4-0 MONOCRYL PLUS PS-2 - 27 INCH (36/BX)

## (undated) DEVICE — OBTURATOR BLADELESS STANDARD 8MM (6EA/BX)

## (undated) DEVICE — COVER TIP ENDOWRIST HOT SHEAR - (10EA/BX) DA VINCI

## (undated) DEVICE — PROTECTOR ULNA NERVE - (36PR/CA)

## (undated) DEVICE — MASK, LARYNGEAL AIRWAY #4

## (undated) DEVICE — DRAPE LARGE 3 QUARTER - (20/CA)

## (undated) DEVICE — NEEDLE SAFETY 18 GA X 1 1/2 IN (100EA/BX)

## (undated) DEVICE — SODIUM CHL IRRIGATION 0.9% 1000ML (12EA/CA)

## (undated) DEVICE — TOWEL STOP TIMEOUT SAFETY FLAG (40EA/CA)

## (undated) DEVICE — PACK UPPER EXTREMITY (2EA/CA)

## (undated) DEVICE — BRIEF STRETCH MATERNITY M/L - FITS 20-60IN (5EA/BG 20BG/CA)

## (undated) DEVICE — DA VINCI INSUFFLATOR TUBE SET - SMOKE EVACUATION (6EA/BX)

## (undated) DEVICE — BAG SPONGE COUNT 10.25 X 32 - BLUE (250/CA)

## (undated) DEVICE — ADHESIVE MASTISOL - (48/BX)

## (undated) DEVICE — GLOVE BIOGEL INDICATOR SZ 8 SURGICAL PF LTX - (50/BX 4BX/CA)

## (undated) DEVICE — GLOVE BIOGEL PI INDICATOR SZ 6.5 SURGICAL PF LF - (50/BX 4BX/CA)

## (undated) DEVICE — GLOVE BIOGEL PI INDICATOR SZ 7.5 SURGICAL PF LF -(50/BX 4BX/CA)

## (undated) DEVICE — DRAPE LOWER EXTREMETY - (6/CA)

## (undated) DEVICE — BANDAGE STERILE 2 IN X 75 IN (12EA/BX 8BX/CA)

## (undated) DEVICE — CANISTER SUCTION 3000ML MECHANICAL FILTER AUTO SHUTOFF MEDI-VAC NONSTERILE LF DISP  (40EA/CA)

## (undated) DEVICE — SUTURE 4-0 ETHILON FS-2 18 (36PK/BX)"

## (undated) DEVICE — SUTURE 4-0 PROLENE PS-2 18 (36PK/BX)"

## (undated) DEVICE — TUBING CLEARLINK DUO-VENT - C-FLO (48EA/CA)

## (undated) DEVICE — SUTURE 0 VICRYL PLUS CT-2 - 27 INCH (36/BX)

## (undated) DEVICE — GLOVE BIOGEL SZ 6.5 SURGICAL PF LTX (50PR/BX 4BX/CA)

## (undated) DEVICE — SPONGE GAUZESTER 4 X 4 4PLY - (128PK/CA)

## (undated) DEVICE — CLEANER ELECTRO-SURGICAL TIP - (25/BX 4BX/CA)

## (undated) DEVICE — SUTURE 0 PDS CT-2 (36PK/BX)

## (undated) DEVICE — SUTURE 3-0 VICRYL PLUS SH - 27 INCH (36/BX)

## (undated) DEVICE — NEEDLE NON SAFETY HYPO 22 GA X 1 1/2 IN (100/BX)

## (undated) DEVICE — DRAPE COLUMN BOX OF 20

## (undated) DEVICE — PACK LOWER EXTREMITY - (2/CA)

## (undated) DEVICE — LACTATED RINGERS INJ 1000 ML - (14EA/CA 60CA/PF)

## (undated) DEVICE — TUBE CONNECT SUCTION CLEAR 120 X 1/4" (50EA/CA)"

## (undated) DEVICE — GLOVE BIOGEL INDICATOR SZ 7SURGICAL PF LTX - (50/BX 4BX/CA)

## (undated) DEVICE — ELECTRODE 850 FOAM ADHESIVE - HYDROGEL RADIOTRNSPRNT (50/PK)

## (undated) DEVICE — SUTURE GENERAL

## (undated) DEVICE — SUTURE GOR-TEX CV-2 TH-26 (2NO2A=12PK/BX) (2NO2B=36PK/BX)

## (undated) DEVICE — GLOVE BIOGEL PI INDICATOR SZ 7.0 SURGICAL PF LF - (50/BX 4BX/CA)

## (undated) DEVICE — DRAPE C-ARM LARGE 41IN X 74 IN - (10/BX 2BX/CA)

## (undated) DEVICE — BOVIE NEEDLE TIP INSULATD NON-SAFETY 2CM (50/PK)

## (undated) DEVICE — TUBING CASSETTE CROSSFLOW INTEGRATED (10EA/CA)

## (undated) DEVICE — BLADE SURGICAL #15 - (50/BX 3BX/CA)

## (undated) DEVICE — WATER IRRIGATION STERILE 1000ML (12EA/CA)

## (undated) DEVICE — DRAPE SURGICAL U 77X120 - (10/CA)

## (undated) DEVICE — GLOVE, LITE (PAIR)

## (undated) DEVICE — KIT SURGIFLO W/OUT THROMBIN - (6EA/BX)

## (undated) DEVICE — SCRUB SOLUTION EXIDINE 4% 40Z - 48/CS CHLORAHEXADINE GLUCONATE

## (undated) DEVICE — SPLINT PLASTER 4 IN  X 15 IN - (50/BX 12BX/CA)

## (undated) DEVICE — DRAIN PENROSE STERILE 1/4 X - 18 IN  (25EA/BX)

## (undated) DEVICE — SUTURE 0 VICRYL PLUS UR-6 - 27 INCH (36/BX)

## (undated) DEVICE — GLOVE BIOGEL SZ 7 SURGICAL PF LTX - (50PR/BX 4BX/CA)

## (undated) DEVICE — SUTURE 2-0 VICRYL PLUS CT-1 - 8 X 18 INCH(12/BX)

## (undated) DEVICE — SUTURE 0 VICRYL PLUS CT-1 - 8 X 18 INCH (12/BX)

## (undated) DEVICE — SYRINGE ASEPTO - (50EA/CA

## (undated) DEVICE — SUTURE 4-0 MONOCRYL PLUS P-3 (12PK/BX)

## (undated) DEVICE — SLEEVE VASO DVT COMPRESSION CALF MED - (10PR/CA)

## (undated) DEVICE — TUBING DAY USE W/CARTRIDGE (10EA/BX)

## (undated) DEVICE — DRAPE ARM BOX OF 20

## (undated) DEVICE — SODIUM CHL. INJ. 0.9% 500ML (24EA/CA 50CA/PF)

## (undated) DEVICE — GOWN SURGICAL X-LARGE ULTRA - FILM-REINFORCED (20/CA)

## (undated) DEVICE — CANISTER SUCTION 3000ML MECHANICAL FILTER AUTO SHUTOFF MEDI-VAC NONSTERILE LF DISP (40EA/CA)

## (undated) DEVICE — KIT ANESTHESIA W/CIRCUIT & 3/LT BAG W/FILTER (20EA/CA)

## (undated) DEVICE — KIT  I.V. START (100EA/CA)

## (undated) DEVICE — ABLATOR WAND SERFAS 90-S CRUISE

## (undated) DEVICE — TIP SACROCERVICOPEXY

## (undated) DEVICE — GLOVE BIOGEL INDICATOR SZ 7.5 SURGICAL PF LTX - (50PR/BX 4BX/CA)

## (undated) DEVICE — SODIUM CHL. IRRIGATION 0.9% 3000ML (4EA/CA 65CA/PF)

## (undated) DEVICE — PAD SANITARY 11IN MAXI IND WRAPPED (12EA/PK 24PK/CA)

## (undated) DEVICE — OBTURATOR BLADELESS LONG 8MM (6EA/BX)

## (undated) DEVICE — GLOVE BIOGEL SZ 8 SURGICAL PF LTX - (50PR/BX 4BX/CA)

## (undated) DEVICE — SUTURE 2-0 PROLENE SH (36PK/BX)

## (undated) DEVICE — DERMABOND ADVANCED - (12EA/BX)

## (undated) DEVICE — TOWELS CLOTH SURGICAL - (4/PK 20PK/CA)

## (undated) DEVICE — RINGDISP RETRACTOR LONESTAR

## (undated) DEVICE — HEAD HOLDER JUNIOR/ADULT

## (undated) DEVICE — SET IRRIGATION CYSTOSCOPY TUBE L80 IN (20EA/CA)

## (undated) DEVICE — SET EXTENSION WITH 2 PORTS (48EA/CA) ***PART #2C8610 IS A SUBSTITUTE*****

## (undated) DEVICE — MASK ANESTHESIA ADULT  - (100/CA)

## (undated) DEVICE — SYRINGE DISP. 60 CC LL - (30/BX, 12BX/CA)**WHEN THESE ARE GONE ORDER #500206**

## (undated) DEVICE — SUTURE 3-0 PROLENE PS-1 (12PK/BX)

## (undated) DEVICE — NEEDLE INSFL 120MM 14GA VRRS - (20/BX)

## (undated) DEVICE — CANISTER SUCTION RIGID RED 1500CC (40EA/CA)

## (undated) DEVICE — APPLICATOR SURGIFLO - (6EA/BX)

## (undated) DEVICE — SUTURE 2-0 PDS PLUS SH 27 (36EA/BX)"

## (undated) DEVICE — CORDS BIPOLAR COAGULATION - 12FT STERILE DISP. (10EA/BX)

## (undated) DEVICE — SLEEVE VASO CALF MED - (10PR/CA)

## (undated) DEVICE — SUTURE 2-0 VICRYL PLUS SH - 27 INCH (36/BX)

## (undated) DEVICE — SUTURE 2-0 20CM STRATAFIX SPIRAL SH NEEDLE (12/BX)

## (undated) DEVICE — SYRINGE SAFETY 10 ML 18 GA X 1 1/2 BLUNT LL (100/BX 4BX/CA)

## (undated) DEVICE — PADDING CAST 4 IN X 4 YDS - SOF-ROLL (12RL/BG 6BG/CT)

## (undated) DEVICE — TRAY SRGPRP PVP IOD WT PRP - (20/CA)

## (undated) DEVICE — TOURNIQUET CUFF 18 X 3 ONE PORT DISP - STERILE (10/BX)

## (undated) DEVICE — PACK MINOR BASIN - (2EA/CA)

## (undated) DEVICE — TRAY FOLEY CATHETER STATLOCK 16FR SURESTEP (10EA/CA)

## (undated) DEVICE — PENCIL ELECTSURG 10FT BTN SWH - (50/CA)

## (undated) DEVICE — TOURNIQUET, STERILE 18 (RED)

## (undated) DEVICE — BLADE SURGICAL #10 - (50/BX)